# Patient Record
Sex: MALE | Race: ASIAN | Employment: FULL TIME | URBAN - METROPOLITAN AREA
[De-identification: names, ages, dates, MRNs, and addresses within clinical notes are randomized per-mention and may not be internally consistent; named-entity substitution may affect disease eponyms.]

---

## 2017-09-10 ENCOUNTER — HOSPITAL ENCOUNTER (EMERGENCY)
Age: 37
Discharge: HOME | End: 2017-09-10
Attending: EMERGENCY MEDICINE
Payer: COMMERCIAL

## 2017-09-10 ENCOUNTER — APPOINTMENT (OUTPATIENT)
Dept: CT IMAGING | Age: 37
End: 2017-09-10
Attending: EMERGENCY MEDICINE
Payer: COMMERCIAL

## 2017-09-10 VITALS
SYSTOLIC BLOOD PRESSURE: 117 MMHG | OXYGEN SATURATION: 98 % | WEIGHT: 195 LBS | HEIGHT: 72 IN | BODY MASS INDEX: 26.41 KG/M2 | TEMPERATURE: 98 F | RESPIRATION RATE: 16 BRPM | HEART RATE: 61 BPM | DIASTOLIC BLOOD PRESSURE: 74 MMHG

## 2017-09-10 DIAGNOSIS — N20.0 KIDNEY STONE: Primary | ICD-10-CM

## 2017-09-10 LAB
ALBUMIN SERPL-MCNC: 3.9 G/DL (ref 3.5–4.8)
ALP LIVER SERPL-CCNC: 104 U/L (ref 45–117)
ALT SERPL-CCNC: 50 U/L (ref 17–63)
AST SERPL-CCNC: 26 U/L (ref 15–41)
BASOPHILS # BLD AUTO: 0.02 X10(3) UL (ref 0–0.1)
BASOPHILS NFR BLD AUTO: 0.3 %
BILIRUB SERPL-MCNC: 0.3 MG/DL (ref 0.1–2)
BILIRUB UR QL STRIP.AUTO: NEGATIVE
BUN BLD-MCNC: 15 MG/DL (ref 8–20)
CALCIUM BLD-MCNC: 8.8 MG/DL (ref 8.3–10.3)
CHLORIDE: 108 MMOL/L (ref 101–111)
CO2: 27 MMOL/L (ref 22–32)
COLOR UR AUTO: YELLOW
CREAT BLD-MCNC: 1.16 MG/DL (ref 0.7–1.3)
EOSINOPHIL # BLD AUTO: 0.31 X10(3) UL (ref 0–0.3)
EOSINOPHIL NFR BLD AUTO: 3.9 %
ERYTHROCYTE [DISTWIDTH] IN BLOOD BY AUTOMATED COUNT: 12.3 % (ref 11.5–16)
GLUCOSE BLD-MCNC: 129 MG/DL (ref 70–99)
GLUCOSE UR STRIP.AUTO-MCNC: NEGATIVE MG/DL
HCT VFR BLD AUTO: 43.7 % (ref 37–53)
HGB BLD-MCNC: 15 G/DL (ref 13–17)
IMMATURE GRANULOCYTE COUNT: 0.01 X10(3) UL (ref 0–1)
IMMATURE GRANULOCYTE RATIO %: 0.1 %
LEUKOCYTE ESTERASE UR QL STRIP.AUTO: NEGATIVE
LYMPHOCYTES # BLD AUTO: 2.56 X10(3) UL (ref 0.9–4)
LYMPHOCYTES NFR BLD AUTO: 32.2 %
M PROTEIN MFR SERPL ELPH: 7.5 G/DL (ref 6.1–8.3)
MCH RBC QN AUTO: 30.3 PG (ref 27–33.2)
MCHC RBC AUTO-ENTMCNC: 34.3 G/DL (ref 31–37)
MCV RBC AUTO: 88.3 FL (ref 80–99)
MONOCYTES # BLD AUTO: 0.39 X10(3) UL (ref 0.1–0.6)
MONOCYTES NFR BLD AUTO: 4.9 %
NEUTROPHIL ABS PRELIM: 4.67 X10 (3) UL (ref 1.3–6.7)
NEUTROPHILS # BLD AUTO: 4.67 X10(3) UL (ref 1.3–6.7)
NEUTROPHILS NFR BLD AUTO: 58.6 %
NITRITE UR QL STRIP.AUTO: NEGATIVE
PH UR STRIP.AUTO: 6 [PH] (ref 4.5–8)
PLATELET # BLD AUTO: 217 10(3)UL (ref 150–450)
POTASSIUM SERPL-SCNC: 3.7 MMOL/L (ref 3.6–5.1)
PROT UR STRIP.AUTO-MCNC: NEGATIVE MG/DL
RBC # BLD AUTO: 4.95 X10(6)UL (ref 4.3–5.7)
RBC #/AREA URNS AUTO: >10 /HPF
RED CELL DISTRIBUTION WIDTH-SD: 40 FL (ref 35.1–46.3)
SODIUM SERPL-SCNC: 140 MMOL/L (ref 136–144)
SP GR UR STRIP.AUTO: 1.02 (ref 1–1.03)
UROBILINOGEN UR STRIP.AUTO-MCNC: 0.2 MG/DL
WBC # BLD AUTO: 8 X10(3) UL (ref 4–13)

## 2017-09-10 PROCEDURE — 81001 URINALYSIS AUTO W/SCOPE: CPT | Performed by: EMERGENCY MEDICINE

## 2017-09-10 PROCEDURE — 99285 EMERGENCY DEPT VISIT HI MDM: CPT

## 2017-09-10 PROCEDURE — 81015 MICROSCOPIC EXAM OF URINE: CPT | Performed by: EMERGENCY MEDICINE

## 2017-09-10 PROCEDURE — 85025 COMPLETE CBC W/AUTO DIFF WBC: CPT | Performed by: EMERGENCY MEDICINE

## 2017-09-10 PROCEDURE — 96374 THER/PROPH/DIAG INJ IV PUSH: CPT

## 2017-09-10 PROCEDURE — 74176 CT ABD & PELVIS W/O CONTRAST: CPT | Performed by: EMERGENCY MEDICINE

## 2017-09-10 PROCEDURE — 99284 EMERGENCY DEPT VISIT MOD MDM: CPT

## 2017-09-10 PROCEDURE — 80053 COMPREHEN METABOLIC PANEL: CPT | Performed by: EMERGENCY MEDICINE

## 2017-09-10 PROCEDURE — 96361 HYDRATE IV INFUSION ADD-ON: CPT

## 2017-09-10 RX ORDER — TAMSULOSIN HYDROCHLORIDE 0.4 MG/1
0.4 CAPSULE ORAL DAILY
Qty: 7 CAPSULE | Refills: 0 | Status: SHIPPED | OUTPATIENT
Start: 2017-09-10 | End: 2017-09-17

## 2017-09-10 RX ORDER — SODIUM CHLORIDE 9 MG/ML
INJECTION, SOLUTION INTRAVENOUS ONCE
Status: COMPLETED | OUTPATIENT
Start: 2017-09-10 | End: 2017-09-10

## 2017-09-10 RX ORDER — HYDROCODONE BITARTRATE AND ACETAMINOPHEN 5; 325 MG/1; MG/1
1-2 TABLET ORAL EVERY 6 HOURS PRN
Qty: 21 TABLET | Refills: 0 | Status: SHIPPED | OUTPATIENT
Start: 2017-09-10 | End: 2017-10-17

## 2017-09-10 RX ORDER — ONDANSETRON 4 MG/1
4 TABLET, ORALLY DISINTEGRATING ORAL EVERY 4 HOURS PRN
Qty: 10 TABLET | Refills: 0 | Status: SHIPPED | OUTPATIENT
Start: 2017-09-10 | End: 2017-09-17

## 2017-09-10 RX ORDER — KETOROLAC TROMETHAMINE 30 MG/ML
30 INJECTION, SOLUTION INTRAMUSCULAR; INTRAVENOUS ONCE
Status: COMPLETED | OUTPATIENT
Start: 2017-09-10 | End: 2017-09-10

## 2017-09-11 ENCOUNTER — ANESTHESIA (OUTPATIENT)
Dept: SURGERY | Facility: HOSPITAL | Age: 37
End: 2017-09-11
Payer: COMMERCIAL

## 2017-09-11 ENCOUNTER — APPOINTMENT (OUTPATIENT)
Dept: GENERAL RADIOLOGY | Facility: HOSPITAL | Age: 37
End: 2017-09-11
Attending: UROLOGY
Payer: COMMERCIAL

## 2017-09-11 ENCOUNTER — ANESTHESIA EVENT (OUTPATIENT)
Dept: SURGERY | Facility: HOSPITAL | Age: 37
End: 2017-09-11
Payer: COMMERCIAL

## 2017-09-11 ENCOUNTER — SURGERY (OUTPATIENT)
Age: 37
End: 2017-09-11

## 2017-09-11 ENCOUNTER — HOSPITAL ENCOUNTER (OUTPATIENT)
Facility: HOSPITAL | Age: 37
Setting detail: OBSERVATION
Discharge: HOME | End: 2017-09-12
Attending: EMERGENCY MEDICINE | Admitting: HOSPITALIST
Payer: COMMERCIAL

## 2017-09-11 DIAGNOSIS — N20.0 KIDNEY STONE: Primary | ICD-10-CM

## 2017-09-11 PROBLEM — N13.30 HYDRONEPHROSIS: Status: ACTIVE | Noted: 2017-09-11

## 2017-09-11 PROBLEM — N20.1 RIGHT URETERAL STONE: Status: ACTIVE | Noted: 2017-09-11

## 2017-09-11 PROBLEM — R10.9 RIGHT FLANK PAIN: Status: ACTIVE | Noted: 2017-09-11

## 2017-09-11 PROBLEM — R73.9 HYPERGLYCEMIA: Status: ACTIVE | Noted: 2017-09-11

## 2017-09-11 PROBLEM — N13.30 HYDRONEPHROSIS OF RIGHT KIDNEY: Status: ACTIVE | Noted: 2017-09-11

## 2017-09-11 PROBLEM — N20.1 CALCULI, URETER: Status: ACTIVE | Noted: 2017-09-11

## 2017-09-11 LAB
ALBUMIN SERPL-MCNC: 4.4 G/DL (ref 3.5–4.8)
ALP LIVER SERPL-CCNC: 97 U/L (ref 45–117)
ALT SERPL-CCNC: 50 U/L (ref 17–63)
AST SERPL-CCNC: 34 U/L (ref 15–41)
BASOPHILS # BLD AUTO: 0.02 X10(3) UL (ref 0–0.1)
BASOPHILS NFR BLD AUTO: 0.1 %
BILIRUB SERPL-MCNC: 0.4 MG/DL (ref 0.1–2)
BUN BLD-MCNC: 14 MG/DL (ref 8–20)
CALCIUM BLD-MCNC: 9.6 MG/DL (ref 8.3–10.3)
CHLORIDE: 106 MMOL/L (ref 101–111)
CO2: 24 MMOL/L (ref 22–32)
CREAT BLD-MCNC: 1.05 MG/DL (ref 0.7–1.3)
EOSINOPHIL # BLD AUTO: 0.06 X10(3) UL (ref 0–0.3)
EOSINOPHIL NFR BLD AUTO: 0.4 %
ERYTHROCYTE [DISTWIDTH] IN BLOOD BY AUTOMATED COUNT: 12.3 % (ref 11.5–16)
GLUCOSE BLD-MCNC: 103 MG/DL (ref 70–99)
HCT VFR BLD AUTO: 48.9 % (ref 37–53)
HGB BLD-MCNC: 16.1 G/DL (ref 13–17)
IMMATURE GRANULOCYTE COUNT: 0.04 X10(3) UL (ref 0–1)
IMMATURE GRANULOCYTE RATIO %: 0.3 %
LYMPHOCYTES # BLD AUTO: 1.68 X10(3) UL (ref 0.9–4)
LYMPHOCYTES NFR BLD AUTO: 12.1 %
M PROTEIN MFR SERPL ELPH: 8 G/DL (ref 6.1–8.3)
MCH RBC QN AUTO: 29.2 PG (ref 27–33.2)
MCHC RBC AUTO-ENTMCNC: 32.9 G/DL (ref 31–37)
MCV RBC AUTO: 88.7 FL (ref 80–99)
MONOCYTES # BLD AUTO: 0.49 X10(3) UL (ref 0.1–0.6)
MONOCYTES NFR BLD AUTO: 3.5 %
NEUTROPHIL ABS PRELIM: 11.64 X10 (3) UL (ref 1.3–6.7)
NEUTROPHILS # BLD AUTO: 11.64 X10(3) UL (ref 1.3–6.7)
NEUTROPHILS NFR BLD AUTO: 83.6 %
PLATELET # BLD AUTO: 236 10(3)UL (ref 150–450)
POTASSIUM SERPL-SCNC: 3.8 MMOL/L (ref 3.6–5.1)
RBC # BLD AUTO: 5.51 X10(6)UL (ref 4.3–5.7)
RED CELL DISTRIBUTION WIDTH-SD: 39.7 FL (ref 35.1–46.3)
SODIUM SERPL-SCNC: 143 MMOL/L (ref 136–144)
WBC # BLD AUTO: 13.9 X10(3) UL (ref 4–13)

## 2017-09-11 PROCEDURE — 0T768DZ DILATION OF RIGHT URETER WITH INTRALUMINAL DEVICE, VIA NATURAL OR ARTIFICIAL OPENING ENDOSCOPIC: ICD-10-PCS | Performed by: UROLOGY

## 2017-09-11 PROCEDURE — 99220 INITIAL OBSERVATION CARE,LEVL III: CPT | Performed by: HOSPITALIST

## 2017-09-11 PROCEDURE — 76000 FLUOROSCOPY <1 HR PHYS/QHP: CPT | Performed by: UROLOGY

## 2017-09-11 PROCEDURE — BT1DZZZ FLUOROSCOPY OF RIGHT KIDNEY, URETER AND BLADDER: ICD-10-PCS | Performed by: UROLOGY

## 2017-09-11 RX ORDER — DIAZEPAM 5 MG/1
5 TABLET ORAL EVERY 8 HOURS PRN
Status: DISCONTINUED | OUTPATIENT
Start: 2017-09-11 | End: 2017-09-12

## 2017-09-11 RX ORDER — DIAZEPAM 5 MG/1
5 TABLET ORAL EVERY 8 HOURS PRN
Qty: 20 TABLET | Refills: 0 | Status: SHIPPED | OUTPATIENT
Start: 2017-09-11 | End: 2017-09-26

## 2017-09-11 RX ORDER — ACETAMINOPHEN 325 MG/1
650 TABLET ORAL EVERY 6 HOURS PRN
Status: DISCONTINUED | OUTPATIENT
Start: 2017-09-11 | End: 2017-09-12

## 2017-09-11 RX ORDER — METHENAMINE, SODIUM PHOSPHATE, MONOBASIC, MONOHYDRATE, PHENYL SALICYLATE, METHYLENE BLUE, AND HYOSCYAMINE SULFATE 120; 40.8; 36; 10; .12 MG/1; MG/1; MG/1; MG/1; MG/1
1 CAPSULE ORAL 3 TIMES DAILY
Qty: 15 CAPSULE | Refills: 3 | Status: SHIPPED | OUTPATIENT
Start: 2017-09-11 | End: 2017-09-26

## 2017-09-11 RX ORDER — NALOXONE HYDROCHLORIDE 0.4 MG/ML
80 INJECTION, SOLUTION INTRAMUSCULAR; INTRAVENOUS; SUBCUTANEOUS AS NEEDED
Status: DISCONTINUED | OUTPATIENT
Start: 2017-09-11 | End: 2017-09-11 | Stop reason: HOSPADM

## 2017-09-11 RX ORDER — ALFUZOSIN HYDROCHLORIDE 10 MG/1
10 TABLET, EXTENDED RELEASE ORAL
Status: DISCONTINUED | OUTPATIENT
Start: 2017-09-12 | End: 2017-09-12

## 2017-09-11 RX ORDER — DIPHENHYDRAMINE HYDROCHLORIDE 50 MG/ML
12.5 INJECTION INTRAMUSCULAR; INTRAVENOUS AS NEEDED
Status: DISCONTINUED | OUTPATIENT
Start: 2017-09-11 | End: 2017-09-11 | Stop reason: HOSPADM

## 2017-09-11 RX ORDER — ONDANSETRON 2 MG/ML
4 INJECTION INTRAMUSCULAR; INTRAVENOUS EVERY 4 HOURS PRN
Status: CANCELLED | OUTPATIENT
Start: 2017-09-11

## 2017-09-11 RX ORDER — TOLTERODINE 4 MG/1
4 CAPSULE, EXTENDED RELEASE ORAL DAILY
Qty: 10 CAPSULE | Refills: 1 | Status: SHIPPED | OUTPATIENT
Start: 2017-09-11 | End: 2017-10-17

## 2017-09-11 RX ORDER — HYDROMORPHONE HYDROCHLORIDE 1 MG/ML
0.5 INJECTION, SOLUTION INTRAMUSCULAR; INTRAVENOUS; SUBCUTANEOUS EVERY 30 MIN PRN
Status: CANCELLED | OUTPATIENT
Start: 2017-09-11 | End: 2017-09-11

## 2017-09-11 RX ORDER — HYDROCODONE BITARTRATE AND ACETAMINOPHEN 5; 325 MG/1; MG/1
2 TABLET ORAL EVERY 4 HOURS PRN
Status: DISCONTINUED | OUTPATIENT
Start: 2017-09-11 | End: 2017-09-12

## 2017-09-11 RX ORDER — SODIUM CHLORIDE, SODIUM LACTATE, POTASSIUM CHLORIDE, CALCIUM CHLORIDE 600; 310; 30; 20 MG/100ML; MG/100ML; MG/100ML; MG/100ML
INJECTION, SOLUTION INTRAVENOUS CONTINUOUS
Status: DISCONTINUED | OUTPATIENT
Start: 2017-09-11 | End: 2017-09-12

## 2017-09-11 RX ORDER — ONDANSETRON 2 MG/ML
4 INJECTION INTRAMUSCULAR; INTRAVENOUS ONCE
Status: COMPLETED | OUTPATIENT
Start: 2017-09-11 | End: 2017-09-11

## 2017-09-11 RX ORDER — ONDANSETRON 2 MG/ML
4 INJECTION INTRAMUSCULAR; INTRAVENOUS EVERY 6 HOURS PRN
Status: DISCONTINUED | OUTPATIENT
Start: 2017-09-11 | End: 2017-09-12

## 2017-09-11 RX ORDER — MEPERIDINE HYDROCHLORIDE 25 MG/ML
12.5 INJECTION INTRAMUSCULAR; INTRAVENOUS; SUBCUTANEOUS AS NEEDED
Status: DISCONTINUED | OUTPATIENT
Start: 2017-09-11 | End: 2017-09-11 | Stop reason: HOSPADM

## 2017-09-11 RX ORDER — ONDANSETRON 4 MG/1
4 TABLET, FILM COATED ORAL EVERY 6 HOURS PRN
Status: DISCONTINUED | OUTPATIENT
Start: 2017-09-11 | End: 2017-09-12

## 2017-09-11 RX ORDER — ACETAMINOPHEN 500 MG
1000 TABLET ORAL ONCE AS NEEDED
Status: DISCONTINUED | OUTPATIENT
Start: 2017-09-11 | End: 2017-09-11 | Stop reason: HOSPADM

## 2017-09-11 RX ORDER — HYDROMORPHONE HYDROCHLORIDE 1 MG/ML
1 INJECTION, SOLUTION INTRAMUSCULAR; INTRAVENOUS; SUBCUTANEOUS ONCE
Status: COMPLETED | OUTPATIENT
Start: 2017-09-11 | End: 2017-09-11

## 2017-09-11 RX ORDER — PHENAZOPYRIDINE HYDROCHLORIDE 200 MG/1
200 TABLET, FILM COATED ORAL 3 TIMES DAILY PRN
Status: DISCONTINUED | OUTPATIENT
Start: 2017-09-11 | End: 2017-09-12

## 2017-09-11 RX ORDER — SODIUM CHLORIDE 9 MG/ML
INJECTION, SOLUTION INTRAVENOUS CONTINUOUS
Status: DISCONTINUED | OUTPATIENT
Start: 2017-09-11 | End: 2017-09-12

## 2017-09-11 RX ORDER — HYDROMORPHONE HYDROCHLORIDE 1 MG/ML
0.4 INJECTION, SOLUTION INTRAMUSCULAR; INTRAVENOUS; SUBCUTANEOUS EVERY 2 HOUR PRN
Status: DISCONTINUED | OUTPATIENT
Start: 2017-09-11 | End: 2017-09-11

## 2017-09-11 RX ORDER — SODIUM CHLORIDE 9 MG/ML
INJECTION, SOLUTION INTRAVENOUS CONTINUOUS
Status: CANCELLED | OUTPATIENT
Start: 2017-09-11 | End: 2017-09-11

## 2017-09-11 RX ORDER — HYDROMORPHONE HYDROCHLORIDE 1 MG/ML
0.4 INJECTION, SOLUTION INTRAMUSCULAR; INTRAVENOUS; SUBCUTANEOUS EVERY 5 MIN PRN
Status: DISCONTINUED | OUTPATIENT
Start: 2017-09-11 | End: 2017-09-11 | Stop reason: HOSPADM

## 2017-09-11 RX ORDER — HYDROMORPHONE HYDROCHLORIDE 1 MG/ML
0.8 INJECTION, SOLUTION INTRAMUSCULAR; INTRAVENOUS; SUBCUTANEOUS EVERY 2 HOUR PRN
Status: DISCONTINUED | OUTPATIENT
Start: 2017-09-11 | End: 2017-09-11

## 2017-09-11 RX ORDER — ONDANSETRON 2 MG/ML
4 INJECTION INTRAMUSCULAR; INTRAVENOUS EVERY 6 HOURS PRN
Status: DISCONTINUED | OUTPATIENT
Start: 2017-09-11 | End: 2017-09-11

## 2017-09-11 RX ORDER — HYDROCODONE BITARTRATE AND ACETAMINOPHEN 5; 325 MG/1; MG/1
1 TABLET ORAL EVERY 6 HOURS PRN
Status: DISCONTINUED | OUTPATIENT
Start: 2017-09-11 | End: 2017-09-12

## 2017-09-11 RX ORDER — ONDANSETRON 2 MG/ML
4 INJECTION INTRAMUSCULAR; INTRAVENOUS AS NEEDED
Status: DISCONTINUED | OUTPATIENT
Start: 2017-09-11 | End: 2017-09-11 | Stop reason: HOSPADM

## 2017-09-11 RX ORDER — DIAZEPAM 5 MG/1
5 TABLET ORAL EVERY 8 HOURS PRN
Qty: 20 TABLET | Refills: 0 | Status: SHIPPED | OUTPATIENT
Start: 2017-09-11 | End: 2017-10-01

## 2017-09-11 RX ORDER — OXYBUTYNIN CHLORIDE 5 MG/1
5 TABLET ORAL EVERY 6 HOURS PRN
Status: DISCONTINUED | OUTPATIENT
Start: 2017-09-11 | End: 2017-09-12

## 2017-09-11 RX ORDER — HYDROMORPHONE HYDROCHLORIDE 1 MG/ML
0.5 INJECTION, SOLUTION INTRAMUSCULAR; INTRAVENOUS; SUBCUTANEOUS EVERY 2 HOUR PRN
Status: DISCONTINUED | OUTPATIENT
Start: 2017-09-11 | End: 2017-09-12

## 2017-09-11 RX ORDER — HYDROMORPHONE HYDROCHLORIDE 1 MG/ML
1 INJECTION, SOLUTION INTRAMUSCULAR; INTRAVENOUS; SUBCUTANEOUS EVERY 2 HOUR PRN
Status: DISCONTINUED | OUTPATIENT
Start: 2017-09-11 | End: 2017-09-12

## 2017-09-11 RX ORDER — DOCUSATE SODIUM 100 MG/1
100 CAPSULE, LIQUID FILLED ORAL 2 TIMES DAILY
Status: DISCONTINUED | OUTPATIENT
Start: 2017-09-11 | End: 2017-09-12

## 2017-09-11 RX ORDER — HYDROMORPHONE HYDROCHLORIDE 1 MG/ML
0.2 INJECTION, SOLUTION INTRAMUSCULAR; INTRAVENOUS; SUBCUTANEOUS EVERY 2 HOUR PRN
Status: DISCONTINUED | OUTPATIENT
Start: 2017-09-11 | End: 2017-09-11

## 2017-09-11 RX ORDER — TAMSULOSIN HYDROCHLORIDE 0.4 MG/1
0.4 CAPSULE ORAL EVERY EVENING
Qty: 10 CAPSULE | Refills: 0 | Status: SHIPPED | OUTPATIENT
Start: 2017-09-11 | End: 2017-09-21

## 2017-09-11 NOTE — PLAN OF CARE
NURSING ADMISSION NOTE      Patient admitted via ER  Oriented to room. Safety precautions initiated. Bed in low position. Call light in reach. Per ER RN Urology and Hospitalist aware of consults.

## 2017-09-11 NOTE — ED INITIAL ASSESSMENT (HPI)
Intermitent right sided abdominal pain that radiates into the groin. Pt reports it feels similar to his last kidney stone.

## 2017-09-11 NOTE — ED PROVIDER NOTES
Patient Seen in: [facility] Emergency Department    History   Patient presents with:  Abdomen/Flank Pain (GI/)    Stated Complaint: pt to have KS removed at 2130 but [facility]     HPI    Mr. Lars Barron is a 45-year-old male cornea clear, normal fundoscopic exam   Ears:  TM pearly gray color, external ear canals normal, both ears, no mastoid tenderness bilaterally   Nose:  Nares symmetrical, minimal watery discharge; no sinus tenderness  Throat:  Lips, tongue, and mucosa are m DRAW BLUE   RAINBOW DRAW LAVENDER   RAINBOW DRAW LIGHT GREEN   RAINBOW DRAW GOLD     Ct Abdomen+pelvis Kidneystone 2d Rndr(no Iv,no Oral)(cpt=74176)    Result Date: 9/10/2017  PROCEDURE:  CT ABDOMEN+PELVIS KIDNEYSTONE 2D RNDR(NO IV,NO ORAL)(CPT=74176)  COM hydronephrosis and trace right perinephric stranding due to a 10 mm stone in the proximal right ureter just beyond the UPJ.  2. Bilateral nephrolithiasis. 3. 2 mm probable cyst along the lower pole left kidney. Please see above for further details.   Dic

## 2017-09-11 NOTE — ED PROVIDER NOTES
Patient Seen in: THE [facility] Emergency Department In [place]    History   Patient presents with:  Abdomen/Flank Pain (GI/)    Stated Complaint: R abd pain, a little back pain, denies urinary s/s    HPI    Patient is a 62-year-old  male who presents emerg sitting up breathing easily in no apparent distress. Patient is nontoxic in appearance. HEENT: Head is normocephalic, atraumatic. Pupils are 4 mm equally round and reactive to light. Oropharynx is clear.  Mucous membranes are moist.  NECK: There is no foc Narrative: The following orders were created for panel order CBC WITH DIFFERENTIAL WITH PLATELET.   Procedure                               Abnormality         Status                     ---------                               -----------         ------ discharged home at this time.         Disposition and Plan     Clinical Impression:  Kidney stone  (primary encounter diagnosis)    Disposition:  Discharge    Follow-up:  Edwin Nichols MD  [facility], [address] [id number]

## 2017-09-11 NOTE — ED INITIAL ASSESSMENT (HPI)
Pt dx with bilateral kidney stones. Pt scheduled for OR today at 2130. Pt states taking 1 5mg norco at 1100 and 1 5mg norco at 1200 without relief. Pt states pain to bilateral flanks but worse on right.   Pt states 1 episode of emesis prior to arrival.

## 2017-09-11 NOTE — H&P
[facility]  History and Physical     Jane Loges Patient Status:  Emergency    1980 MRN [id number]   Location [address] [facility] Attending Delfin Dancer, MD   Hosp Day # 0 PCP Rashel Cramer MD     Chief Complaint: negatives noted in the HPI. Physical Exam:    /72   Pulse 73   Temp 97.6 °F (36.4 °C) (Temporal)   Resp 16   Ht 6' (1.829 m)   Wt 195 lb (88.5 kg)   SpO2 100%   BMI 26.45 kg/m²   General: No acute distress. Alert and oriented x 3.    HEENT: Normoce

## 2017-09-12 ENCOUNTER — APPOINTMENT (OUTPATIENT)
Dept: GENERAL RADIOLOGY | Facility: HOSPITAL | Age: 37
End: 2017-09-12
Attending: HOSPITALIST
Payer: COMMERCIAL

## 2017-09-12 VITALS
SYSTOLIC BLOOD PRESSURE: 133 MMHG | DIASTOLIC BLOOD PRESSURE: 84 MMHG | BODY MASS INDEX: 26.41 KG/M2 | TEMPERATURE: 98 F | RESPIRATION RATE: 16 BRPM | OXYGEN SATURATION: 99 % | HEART RATE: 69 BPM | WEIGHT: 195 LBS | HEIGHT: 72 IN

## 2017-09-12 LAB
BASOPHILS # BLD AUTO: 0.01 X10(3) UL (ref 0–0.1)
BASOPHILS NFR BLD AUTO: 0.1 %
BUN BLD-MCNC: 10 MG/DL (ref 8–20)
CALCIUM BLD-MCNC: 9.2 MG/DL (ref 8.3–10.3)
CHLORIDE: 108 MMOL/L (ref 101–111)
CO2: 26 MMOL/L (ref 22–32)
CREAT BLD-MCNC: 0.73 MG/DL (ref 0.7–1.3)
EOSINOPHIL # BLD AUTO: 0 X10(3) UL (ref 0–0.3)
EOSINOPHIL NFR BLD AUTO: 0 %
ERYTHROCYTE [DISTWIDTH] IN BLOOD BY AUTOMATED COUNT: 12.3 % (ref 11.5–16)
GLUCOSE BLD-MCNC: 126 MG/DL (ref 70–99)
HCT VFR BLD AUTO: 43.7 % (ref 37–53)
HGB BLD-MCNC: 14.7 G/DL (ref 13–17)
IMMATURE GRANULOCYTE COUNT: 0.03 X10(3) UL (ref 0–1)
IMMATURE GRANULOCYTE RATIO %: 0.3 %
LYMPHOCYTES # BLD AUTO: 1.07 X10(3) UL (ref 0.9–4)
LYMPHOCYTES NFR BLD AUTO: 11.9 %
MCH RBC QN AUTO: 29.6 PG (ref 27–33.2)
MCHC RBC AUTO-ENTMCNC: 33.6 G/DL (ref 31–37)
MCV RBC AUTO: 88.1 FL (ref 80–99)
MONOCYTES # BLD AUTO: 0.22 X10(3) UL (ref 0.1–0.6)
MONOCYTES NFR BLD AUTO: 2.4 %
NEUTROPHIL ABS PRELIM: 7.66 X10 (3) UL (ref 1.3–6.7)
NEUTROPHILS # BLD AUTO: 7.66 X10(3) UL (ref 1.3–6.7)
NEUTROPHILS NFR BLD AUTO: 85.3 %
PLATELET # BLD AUTO: 209 10(3)UL (ref 150–450)
POTASSIUM SERPL-SCNC: 5 MMOL/L (ref 3.6–5.1)
RBC # BLD AUTO: 4.96 X10(6)UL (ref 4.3–5.7)
RED CELL DISTRIBUTION WIDTH-SD: 39.6 FL (ref 35.1–46.3)
SODIUM SERPL-SCNC: 141 MMOL/L (ref 136–144)
WBC # BLD AUTO: 9 X10(3) UL (ref 4–13)

## 2017-09-12 PROCEDURE — 99217 OBSERVATION CARE DISCHARGE: CPT | Performed by: HOSPITALIST

## 2017-09-12 PROCEDURE — 74000 XR ABDOMEN (KUB) (1 AP VIEW)  (CPT=74000): CPT | Performed by: HOSPITALIST

## 2017-09-12 NOTE — BRIEF OP NOTE
Pre-Operative Diagnosis: Calculi, ureter [N20.1], right hydronephrosis, right flank pain     Post-Operative Diagnosis: Calculi, ureter [N20.1], right hydronephrosis, right flank pain     Procedure Performed:   Procedure(s):  CYSTOSCOPY, RIGHT RETROGRADE

## 2017-09-12 NOTE — INTERVAL H&P NOTE
Pre-op Diagnosis: Calculi, ureter [N20.1]    The above referenced H&P was reviewed by Marin Perea MD on 9/11/2017, the patient was examined and no significant changes have occurred in the patient's condition since the H&P was performed.   I discussed wit

## 2017-09-12 NOTE — DISCHARGE SUMMARY
[facility]  DISCHARGE SUMMARY     Rosie Roberts Patient Status:  Observation    1980 MRN [id number]   [facility] 3NW-A Attending Caprice Glez MD   Hosp Day # 0 PCP Torey Dasilva MD     Date of Admission: 2017  Date of Instructions Prescription details   bisacodyl 5 MG Tbec  Commonly known as:  DULCOLAX      Take 1 tablet (5 mg total) by mouth 2 (two) times daily.    Stop taking on:  9/26/2017  Quantity:  20 tablet  Refills:  0     diazepam 5 MG Tabs  Commonly known as: Tabs  Commonly known as:  NORCO      Take 1-2 tablets by mouth every 6 (six) hours as needed.    Quantity:  21 tablet  Refills:  0     ondansetron 4 MG Tbdp  Commonly known as:  ZOFRAN-ODT      Take 1 tablet (4 mg total) by mouth every 4 (four) hours as nee

## 2017-09-12 NOTE — PROGRESS NOTES
[facility]  Urology Progress Note    Silva Barrientos Patient Status:  Observation    1980 MRN [id number]   [facility] 3NW-A Attending Lisseth Crabtree MD   Hosp Day # 0 PCP Ata Decker MD     Subjective:   Terence Berry is a(n) 37

## 2017-09-12 NOTE — PLAN OF CARE
Absence of urinary retention Not Progressing      Discharge to home or other facility with appropriate resources Progressing      Verbalizes/displays adequate comfort level or patient's stated pain goal Progressing      Patient/Family Long Term Goal Progre

## 2017-09-12 NOTE — PAYOR COMM NOTE
--------------  ADMISSION REVIEW     Payor: [address] PPO  Subscriber #:  [id number]  Authorization Number: N/A    Admit date: N/A  Admit time: N/A       Admitting Physician: Yancy Mcneal MD  Attending Physician:  No att. providers found  P vital signs reviewed. All other systems reviewed and negative except as noted above. PSFH elements reviewed from today and agreed except as otherwise stated in HPI.     Physical Exam   ED Triage Vitals [09/11/17 1515]  BP: 140/90  Pulse: 57  Resp: 2 components within normal limits   CBC W/ DIFFERENTIAL - Abnormal; Notable for the following:     WBC 13.9 (*)     Neutrophil Absolute Prelim 11.64 (*)     Neutrophil Absolute 11.64 (*)     All other components within normal limits   CBC WITH DIFFERENTIAL W left kidney. 5 mm nonobstructing stone in the left mid kidney. A few scattered punctate nonobstructing stones in the right kidney.  There is mild right hydronephrosis and trace right perinephric stranding  due to a 10 mm stone in the proximal right ureter j impression(s):  Kidney stone  (primary encounter diagnosis). I agree with the plan as noted. Signed by Vaishali Graf MD on 9/11/2017  5:21 PM   Attribution Key     [address] 1 - Monserrat Hastings on 9/11/2017  3:20 PM   [facility] [address] 2 - [facility]. Family History:[PT.1] Unremarkable.[PT.2]    Allergies: No Known Allergies    Medications:    No current facility-administered medications on file prior to encounter.    Current Outpatient Prescriptions on File Prior to Encounter:  HYDROcodone-ace hours.    Imaging: Imaging data reviewed in Epic. ASSESSMENT / PLAN:     1. Flank pain d/t bilateral nephrolithiasis, 10mm proximal right ureteral stone with mild right hydronephrosis and perinephric stranding  2. Dyslipidemia   3.  Hypothyroidism Route User    Discharged on 9/12/2017 9/11/2017 1804 Given 0.8 mg Intravenous eJfry Carlos RN      HYDROmorphone HCl PF (DILAUDID) 1 MG/ML injection 0.5 mg     Date Action Dose Route User    Discharged on 9/12/2017 9/12/2017 0111 Given 0.5 mg

## 2017-09-12 NOTE — PROGRESS NOTES
From pacu per bed. Alert and awake c/o sl discomfort upper abd. No other c/o offered. Review of md orders. On r/a sats 95%. No urge to void at this time. Complete feeling in feet and legs danielle. Reports will stay over night for his recovery.  Taking sips of l

## 2017-09-12 NOTE — ANESTHESIA POSTPROCEDURE EVALUATION
[address] Patient Status:  Observation   Age/Gender 30year old male MRN [id number]   [facility] 3NW-A Attending Nenita Ledbetter MD   Hosp Day # 0 PCP Rowan Gupta MD       Anesthesia Post-op Note    Procedure(s):  CY

## 2017-09-12 NOTE — ANESTHESIA PREPROCEDURE EVALUATION
PRE-OP EVALUATION    Patient Name: Liam Cutler    Pre-op Diagnosis: Calculi, ureter [N20.1]    Procedure(s):  CYSTOSCOPY, RIGHT RETROGRADE, POSSIBLE STONE EXTRACTION, POSSIBLE LASER LITHOTRIPSY, RIGHT URETEROSCOPY, POSSIBLE RIGHT STENT    Surgeon(s) and tamsulosin HCl (FLOMAX) 0.4 MG Oral Cap Take 1 capsule (0.4 mg total) by mouth daily. Disp: 7 capsule Rfl: 0       Allergies: Review of patient's allergies indicates no known allergies. Anesthesia Evaluation    Patient summary reviewed.     Anestheti

## 2017-09-13 NOTE — OPERATIVE REPORT
[address]    PATIENT'S NAME: JARET MOSS SHAHAB   ATTENDING PHYSICIAN: Zohaib Jackson M.D. OPERATING PHYSICIAN: Oly Leavitt M.D.    PATIENT ACCOUNT#:   [de-identified]    LOCATION:  [address] [place] [address]  MEDICAL RECORD #:   [id number]       DATE OF BIRTH:  [birth year] dilator. Attempt was made to advance in a ureteral sheath, which was unable to accommodate due to the size of the ureter.   Therefore, the flexible ureteroscope was then advanced into the ureter but could not be advanced due to the delicate narr

## 2017-09-26 ENCOUNTER — OFFICE VISIT (OUTPATIENT)
Dept: FAMILY MEDICINE CLINIC | Facility: CLINIC | Age: 37
End: 2017-09-26

## 2017-09-26 VITALS
TEMPERATURE: 98 F | DIASTOLIC BLOOD PRESSURE: 70 MMHG | HEIGHT: 72 IN | SYSTOLIC BLOOD PRESSURE: 120 MMHG | RESPIRATION RATE: 16 BRPM | HEART RATE: 72 BPM | BODY MASS INDEX: 26.63 KG/M2 | WEIGHT: 196.63 LBS

## 2017-09-26 DIAGNOSIS — Z00.00 ROUTINE ADULT HEALTH MAINTENANCE: Primary | ICD-10-CM

## 2017-09-26 PROCEDURE — 99395 PREV VISIT EST AGE 18-39: CPT | Performed by: FAMILY MEDICINE

## 2017-09-26 RX ORDER — MAGNESIUM OXIDE 400 MG (241.3 MG MAGNESIUM) TABLET
400 TABLET DAILY
COMMUNITY
End: 2017-10-17

## 2017-09-26 NOTE — PROGRESS NOTES
Agus Serrano is a 30year old male who presents for a complete physical exam.   HPI:   Pt complains of nothing.   Urination changes no  ED symptoms no  Immunizations needed no  Wt Readings from Last 6 Encounters:  09/26/17 : 196 lb 9.6 oz  09/11/17 : 195 l Absolute Prelim 11.64 (H) 1.30 - 6.70 x10 (3) uL   Neutrophil Absolute 11.64 (H) 1.30 - 6.70 x10(3) uL   Lymphocyte Absolute 1.68 0.90 - 4.00 x10(3) uL   Monocyte Absolute 0.49 0.10 - 0.60 x10(3) uL   Eosinophil Absolute 0.06 0.00 - 0.30 x10(3) uL   Basoph Tab EC Take 1 tablet (5 mg total) by mouth 2 (two) times daily. Disp: 20 tablet Rfl: 0   HYDROcodone-acetaminophen 5-325 MG Oral Tab Take 1-2 tablets by mouth every 6 (six) hours as needed.  Disp: 21 tablet Rfl: 0      Past Medical History:   Diagnosis Date normal voice  SKIN: no rashes, no suspicious moles or lesions  HENT: NCAT, EACs clear b/l, TMs normal b/l, nasal turbinatess normal b/l, oropharynx with mmm/clear/normal, gingiva normal, lips normal  EYES: PERRLA, EOMI, conjunctiva non-injected and non-ict recommendations and agrees to the plan. Pt has form for labs but it says for [employer]. Lab order printed. Pt will go to a lab power facility now as he is fasting. H/o recurrent kidney stones, uptodate resource given. Pt can see prevention measures.

## 2017-10-10 ENCOUNTER — TELEPHONE (OUTPATIENT)
Dept: FAMILY MEDICINE CLINIC | Facility: CLINIC | Age: 37
End: 2017-10-10

## 2017-10-17 ENCOUNTER — OFFICE VISIT (OUTPATIENT)
Dept: FAMILY MEDICINE CLINIC | Facility: CLINIC | Age: 37
End: 2017-10-17

## 2017-10-17 VITALS
RESPIRATION RATE: 16 BRPM | DIASTOLIC BLOOD PRESSURE: 88 MMHG | SYSTOLIC BLOOD PRESSURE: 136 MMHG | BODY MASS INDEX: 26.55 KG/M2 | HEART RATE: 62 BPM | TEMPERATURE: 98 F | HEIGHT: 72 IN | WEIGHT: 196 LBS

## 2017-10-17 DIAGNOSIS — E55.9 VITAMIN D DEFICIENCY: ICD-10-CM

## 2017-10-17 DIAGNOSIS — E78.00 HIGH CHOLESTEROL: Primary | ICD-10-CM

## 2017-10-17 PROCEDURE — 99213 OFFICE O/P EST LOW 20 MIN: CPT | Performed by: FAMILY MEDICINE

## 2017-10-17 NOTE — PROGRESS NOTES
HPI:    Patient ID: Paddy Torres is a 30year old male. HPI  Patient is here for follow-up of labs. He has a history of kidney stone recently and has changed his diet somewhat.   Review of Systems  Negative except for the above       No current outpati Referrals:  None       #[id number]

## 2018-06-28 ENCOUNTER — OFFICE VISIT (OUTPATIENT)
Dept: FAMILY MEDICINE CLINIC | Facility: CLINIC | Age: 38
End: 2018-06-28

## 2018-06-28 VITALS — HEIGHT: 72 IN

## 2018-06-28 DIAGNOSIS — Z72.0 TOBACCO USE: ICD-10-CM

## 2018-06-28 DIAGNOSIS — E78.00 HIGH CHOLESTEROL: ICD-10-CM

## 2018-06-28 DIAGNOSIS — R73.9 ELEVATED BLOOD SUGAR: ICD-10-CM

## 2018-06-28 DIAGNOSIS — R07.9 CHEST PAIN, UNSPECIFIED TYPE: Primary | ICD-10-CM

## 2018-06-28 PROCEDURE — 99214 OFFICE O/P EST MOD 30 MIN: CPT | Performed by: FAMILY MEDICINE

## 2018-06-28 NOTE — PROGRESS NOTES
HPI:    Patient ID: Windy Sharif is a 30year old male. HPI  Brother in law 41 y/o , worked out a lot. Ate healthy. Had heart attack in his sleep and passed away. Patient states that when he does do exercise he does get some chest pain off and on.   He follow-up after that. I told patient that he should start on a statin drug for his cholesterol but he would like to try diet and exercise first.  4/1/18  Glucose 99  hdl 32  ldl 190  Tc 263  tg 221  Waist circ 39 in.    No orders of the defined types were

## 2018-07-17 ENCOUNTER — HOSPITAL ENCOUNTER (OUTPATIENT)
Dept: CT IMAGING | Age: 38
Discharge: HOME | End: 2018-07-17
Attending: FAMILY MEDICINE

## 2018-07-17 DIAGNOSIS — Z13.9 VISIT FOR SCREENING: ICD-10-CM

## 2018-07-30 ENCOUNTER — TELEPHONE (OUTPATIENT)
Dept: FAMILY MEDICINE CLINIC | Facility: CLINIC | Age: 38
End: 2018-07-30

## 2018-07-30 NOTE — TELEPHONE ENCOUNTER
[7/30/2018 4:35 PM] Tay Gearing: here it is    Number above is for patients wife. Wife has limited information. States she would prefer for pt to be seen. Appt was scheduled for Wednesday. PT will call if appt needs to be rescheduled.   Wing [place]

## 2018-08-01 ENCOUNTER — OFFICE VISIT (OUTPATIENT)
Dept: FAMILY MEDICINE CLINIC | Facility: CLINIC | Age: 38
End: 2018-08-01
Payer: COMMERCIAL

## 2018-08-01 VITALS
TEMPERATURE: 99 F | DIASTOLIC BLOOD PRESSURE: 72 MMHG | HEART RATE: 76 BPM | RESPIRATION RATE: 14 BRPM | WEIGHT: 192 LBS | SYSTOLIC BLOOD PRESSURE: 118 MMHG | HEIGHT: 72 IN | OXYGEN SATURATION: 99 % | BODY MASS INDEX: 26.01 KG/M2

## 2018-08-01 DIAGNOSIS — R35.0 INCREASED FREQUENCY OF URINATION: Primary | ICD-10-CM

## 2018-08-01 DIAGNOSIS — E78.00 HIGH CHOLESTEROL: ICD-10-CM

## 2018-08-01 DIAGNOSIS — R63.1 POLYDIPSIA: ICD-10-CM

## 2018-08-01 DIAGNOSIS — R35.89 POLYURIA: ICD-10-CM

## 2018-08-01 DIAGNOSIS — E11.8 TYPE 2 DIABETES MELLITUS WITH COMPLICATION, WITHOUT LONG-TERM CURRENT USE OF INSULIN (HCC): ICD-10-CM

## 2018-08-01 LAB
BILIRUBIN URINE: NEGATIVE
BLOOD URINE: NEGATIVE
CONTROL RUN WITHIN 24 HOURS?: YES
GLUCOSE URINE: 500
KETONE URINE: NEGATIVE
LEUKOCYTE ESTERASE URINE: NEGATIVE
NITRITE URINE: NEGATIVE
PH URINE: 6 (ref 5–8)
PROTEIN URINE: NEGATIVE
SPEC GRAVITY: 1 (ref 1–1.03)
TEST STRIP LOT #: NORMAL NUMERIC
URINE CLARITY: CLEAR
URINE COLOR: YELLOW
UROBILINOGEN URINE: 0.2

## 2018-08-01 PROCEDURE — 99213 OFFICE O/P EST LOW 20 MIN: CPT | Performed by: FAMILY MEDICINE

## 2018-08-01 PROCEDURE — 82962 GLUCOSE BLOOD TEST: CPT | Performed by: FAMILY MEDICINE

## 2018-08-01 RX ORDER — ATORVASTATIN CALCIUM 40 MG/1
40 TABLET, FILM COATED ORAL NIGHTLY
Qty: 30 TABLET | Refills: 3 | Status: SHIPPED | OUTPATIENT
Start: 2018-08-01 | End: 2018-09-28

## 2018-08-01 NOTE — PROGRESS NOTES
HPI:    Patient ID: Aranza Shelby is a 30year old male. Patient is here for evaluation of polyuria and polydipsia. He has been voiding about 15 times per day. This has been occurring for the past three weeks.    Denies nausea, vomiting, abdominal pain, 08/01/18  1416   BP: 118/72   Pulse: 76   Resp: 14   Temp: 98.5 °F (36.9 °C)            ASSESSMENT/PLAN:   Increased frequency of urination  (primary encounter diagnosis);  Type 2 DM  - glucose >500 on UA  - Accucheck unattainable due to reading stating it'

## 2018-08-03 LAB
BUN: 12 MG/DL (ref 7–25)
CALCIUM: 9.9 MG/DL (ref 8.6–10.3)
CARBON DIOXIDE: 29 MMOL/L (ref 20–31)
CHLORIDE: 98 MMOL/L (ref 98–110)
CREATININE: 0.93 MG/DL (ref 0.6–1.35)
EGFR IF AFRICN AM: 123 ML/MIN/1.73M2
EGFR IF NONAFRICN AM: 106 ML/MIN/1.73M2
GLUCOSE: 272 MG/DL (ref 65–99)
HEMOGLOBIN A1C: 10.1 % OF TOTAL HGB
POTASSIUM: 4.4 MMOL/L (ref 3.5–5.3)
SODIUM: 136 MMOL/L (ref 135–146)

## 2018-08-08 ENCOUNTER — OFFICE VISIT (OUTPATIENT)
Dept: FAMILY MEDICINE CLINIC | Facility: CLINIC | Age: 38
End: 2018-08-08
Payer: COMMERCIAL

## 2018-08-08 VITALS
TEMPERATURE: 98 F | BODY MASS INDEX: 25.73 KG/M2 | WEIGHT: 190 LBS | HEIGHT: 72 IN | OXYGEN SATURATION: 99 % | DIASTOLIC BLOOD PRESSURE: 80 MMHG | SYSTOLIC BLOOD PRESSURE: 124 MMHG | HEART RATE: 65 BPM | RESPIRATION RATE: 16 BRPM

## 2018-08-08 DIAGNOSIS — E11.9 NEW ONSET TYPE 2 DIABETES MELLITUS (HCC): Primary | ICD-10-CM

## 2018-08-08 PROCEDURE — 99214 OFFICE O/P EST MOD 30 MIN: CPT | Performed by: FAMILY MEDICINE

## 2018-08-08 RX ORDER — BLOOD-GLUCOSE METER
1 KIT MISCELLANEOUS AS NEEDED
Refills: 0 | Status: CANCELLED | OUTPATIENT
Start: 2018-08-08

## 2018-08-08 RX ORDER — BLOOD-GLUCOSE METER
1 KIT MISCELLANEOUS AS NEEDED
Qty: 1 EACH | Refills: 0 | Status: SHIPPED | OUTPATIENT
Start: 2018-08-08 | End: 2018-08-28

## 2018-08-08 RX ORDER — LANCETS
1 EACH MISCELLANEOUS DAILY
Qty: 1 BOX | Refills: 0 | Status: SHIPPED | OUTPATIENT
Start: 2018-08-08 | End: 2018-08-28

## 2018-08-08 NOTE — PATIENT INSTRUCTIONS
Thank you for allowing me to participate in your care today. I will contact you with any results from today's visit. Lab results are typically available in 2-3 days for blood tests, and 3-5 days for any cultures or Paps.    Please let me know if you hav The strip goes into the meter first, then a drop of blood is placed on the tip of the strip. The meter then shows a reading that tells you the level of your blood sugar. Your readings should be in your target range as often as possible.  This means not too · [employer] [phone] www.nei.nih.gov  · Javi 112 www.hormone. org  Date Last Reviewed: 5/1/2016  © 3152-3336 [employer]. [address] [facility], [address] [place] [address]. All rights reserved.  This informat

## 2018-08-08 NOTE — PROGRESS NOTES
Patient presents with:  Lab Results: new diabetic      HPI:   Patient is here to discuss his new onset type 2 dm. A1c result last week was 10. 1. Symptoms: tingling in bilateral legs, fatigue, tired, increased urinary frequency.      He has a smoking history Inject 10 Units into the skin every morning., Disp: 3 mL, Rfl: 3  •  ACCU-CHEK FASTCLIX LANCETS Does not apply Misc, 1 lancet by Finger stick route daily.  Use as directed., Disp: 1 Box, Rfl: 0  •  FREESTYLE SYSTEM Does not apply Kit, 1 each by Does not john stick route daily. Use as directed. FREESTYLE SYSTEM Does not apply Kit 1 each 0      Si each by Does not apply route as needed for Other.            Imaging & Consults:  OP REFERRAL NEW ONSET DIABETES- NO PREV EDUCATION 10 HRS  OPHTHALMOLOGY - INT

## 2018-08-08 NOTE — PROGRESS NOTES
No chief complaint on file.       HPI:   ***    Diabetic information   Duration: ***    Testing Frequency: ***  Glucometer: ***    Overall glucose control:***    Associated symptoms: ***    Current Glucose level: ***    Severity:   Lab Results  Component Va exercise reviewed with patient. All questions answered. Pt understands and agrees with plan.      Insulin intake   > 180 - increase basal insulin by 8 units   160-180 - increase basal insulin by 6 units   140-159 - increase basal insulin by 4 units   120-1

## 2018-08-09 ENCOUNTER — TELEPHONE (OUTPATIENT)
Dept: FAMILY MEDICINE CLINIC | Facility: CLINIC | Age: 38
End: 2018-08-09

## 2018-08-09 RX ORDER — BLOOD-GLUCOSE METER
KIT MISCELLANEOUS
Qty: 100 STRIP | Refills: 5 | Status: SHIPPED | OUTPATIENT
Start: 2018-08-09 | End: 2018-08-28

## 2018-08-09 NOTE — TELEPHONE ENCOUNTER
Patient was prescribed the Freestyle [place] Lite diabetic meter. He will need a prescription for the testing strips. [facility] [place].

## 2018-08-09 NOTE — TELEPHONE ENCOUNTER
Requesting Freestyle test strips  LOV: 8/9/18  RTC: 2 weeks  Last Relevant Labs: 10.1 8/2/18  Filled: no strips sent    No future appointments.

## 2018-08-14 ENCOUNTER — TELEPHONE (OUTPATIENT)
Dept: FAMILY MEDICINE CLINIC | Facility: CLINIC | Age: 38
End: 2018-08-14

## 2018-08-14 NOTE — TELEPHONE ENCOUNTER
Will contact pt after 3:30pm      Medication Quantity Refills Start End   insulin glargine 100 UNIT/ML Subcutaneous Solution Pen-injector 3 mL 3 8/8/2018    Sig :  Inject 10 Units into the skin every morning.      Route:   Subcutaneous     Order #: [id number]

## 2018-08-14 NOTE — TELEPHONE ENCOUNTER
Patient was seen by Dr. Dyana Farrell on 8/8/18 and is taking his Insulin Glargine 10 units every morning. His accu checks are doing well since beginning this 8/8/18 - originally he was 273, 220, 204, 263, 190 and now today 125.   He is watching diet and exerci

## 2018-08-17 NOTE — TELEPHONE ENCOUNTER
Patient called back. His blood sugar has dropped to 93. He would like to repeat his A1c - he does not feel it was accurate.  He has family members that are physicians and recommended he discontinue his insulin and f/u with you and have another test.  He s

## 2018-08-28 ENCOUNTER — OFFICE VISIT (OUTPATIENT)
Dept: FAMILY MEDICINE CLINIC | Facility: CLINIC | Age: 38
End: 2018-08-28
Payer: COMMERCIAL

## 2018-08-28 VITALS
HEART RATE: 72 BPM | HEIGHT: 72 IN | SYSTOLIC BLOOD PRESSURE: 122 MMHG | RESPIRATION RATE: 12 BRPM | WEIGHT: 190 LBS | OXYGEN SATURATION: 99 % | DIASTOLIC BLOOD PRESSURE: 62 MMHG | BODY MASS INDEX: 25.73 KG/M2 | TEMPERATURE: 98 F

## 2018-08-28 DIAGNOSIS — E11.9 TYPE 2 DIABETES MELLITUS WITHOUT COMPLICATION, WITHOUT LONG-TERM CURRENT USE OF INSULIN (HCC): Primary | ICD-10-CM

## 2018-08-28 DIAGNOSIS — E78.00 HIGH CHOLESTEROL: ICD-10-CM

## 2018-08-28 PROCEDURE — 82570 ASSAY OF URINE CREATININE: CPT | Performed by: FAMILY MEDICINE

## 2018-08-28 PROCEDURE — 82043 UR ALBUMIN QUANTITATIVE: CPT | Performed by: FAMILY MEDICINE

## 2018-08-28 PROCEDURE — 99214 OFFICE O/P EST MOD 30 MIN: CPT | Performed by: FAMILY MEDICINE

## 2018-08-28 NOTE — PROGRESS NOTES
HPI:    Patient ID: Anthony Hu is a 30year old male. HPI  , fasting,   160-170 after supper. Patient is here for follow-up of diabetes. Blood sugars have been better lately he has changed his diet significantly by reducing simple carbs. sugar daily morning fasting and before supper. Follow-up with me in 1 month with log of blood sugars. Hemoglobin A1c uncontrolled treated with oral medication and monitoring and dietary changes at this time.   Blood pressure is good  Urine microalbumi for additional information:  7.79 5/14/2016:  2yr ago      Last refreshed: 8/30/2018 12:48 AM:  Lipid panel service date 5/14/2016       Last refreshed: 8/30/2018 12:48 AM:  T3 View Report for additional information:  3.02 pg/mL 5/14/2016:  2yr ago      La

## 2018-08-28 NOTE — TELEPHONE ENCOUNTER
METFORMIN  MG TABLET  Will file in chart as: METFORMIN  MG Oral Tab  TAKE 1 TABLET BY MOUTH TWICE A DAY WITH MEALS  Disp: 60 tablet Refills: 0    Class: Normal Start: 8/28/2018   Originally ordered: 3 weeks ago by Marcos Israel DO  Last re

## 2018-08-29 LAB
CREAT UR-SCNC: 185 MG/DL
MICROALBUMIN UR-MCNC: 0.9 MG/DL
MICROALBUMIN/CREAT 24H UR-RTO: 4.9 UG/MG (ref ?–30)

## 2018-09-28 ENCOUNTER — TELEPHONE (OUTPATIENT)
Dept: FAMILY MEDICINE CLINIC | Facility: CLINIC | Age: 38
End: 2018-09-28

## 2018-09-28 RX ORDER — ATORVASTATIN CALCIUM 40 MG/1
40 TABLET, FILM COATED ORAL NIGHTLY
Qty: 90 TABLET | Refills: 0 | Status: SHIPPED | OUTPATIENT
Start: 2018-09-28 | End: 2019-02-11

## 2018-10-03 NOTE — TELEPHONE ENCOUNTER
Diabetic Medication Protocol Failed9/30 2:04 AM   Last HgBA1C < 7.5     Requesting Metformin   LOV: 8/28/18  RTC: 1 months   Last Relevant Labs: 8/2/18: 10.1  Filled: 8/28/18 #180 with 3 refills    No future appointments. Pended if okay.  Non protocol me

## 2018-12-14 DIAGNOSIS — E78.00 HIGH CHOLESTEROL: Primary | ICD-10-CM

## 2018-12-14 NOTE — TELEPHONE ENCOUNTER
Cholesterol Medication Protocol [id number] 2:10 AM   ALT < 80    ALT resulted within past year    Lipid panel within past 12 months   Requesting atorvastatin 40 MG Oral Tab  LOV: 8/28/18  RTC: 1 mos  Last Labs: 8/2/18  Filled: 9/28/18#90 with 0 refills

## 2018-12-17 RX ORDER — ATORVASTATIN CALCIUM 40 MG/1
40 TABLET, FILM COATED ORAL DAILY
Qty: 90 TABLET | Refills: 0 | OUTPATIENT
Start: 2018-12-17

## 2018-12-18 NOTE — TELEPHONE ENCOUNTER
Spoke to patient and informed of below, pt will complete labs and call back to schedule OV. No future appointments.

## 2019-01-02 ENCOUNTER — TELEPHONE (OUTPATIENT)
Dept: FAMILY MEDICINE CLINIC | Facility: CLINIC | Age: 39
End: 2019-01-02

## 2019-01-02 NOTE — TELEPHONE ENCOUNTER
Patient will need current lab orders that are for [facility] changed to THE [facility] lab. He will be coming in tomorrow for labs.

## 2019-01-14 ENCOUNTER — LAB ENCOUNTER (OUTPATIENT)
Dept: LAB | Age: 39
End: 2019-01-14
Attending: FAMILY MEDICINE
Payer: COMMERCIAL

## 2019-01-14 ENCOUNTER — APPOINTMENT (OUTPATIENT)
Dept: LAB | Age: 39
End: 2019-01-14
Attending: FAMILY MEDICINE
Payer: COMMERCIAL

## 2019-01-14 DIAGNOSIS — R73.9 ELEVATED BLOOD SUGAR: ICD-10-CM

## 2019-01-14 DIAGNOSIS — E78.00 HIGH CHOLESTEROL: ICD-10-CM

## 2019-01-14 DIAGNOSIS — E11.9 NEW ONSET TYPE 2 DIABETES MELLITUS (HCC): ICD-10-CM

## 2019-01-14 LAB
ALBUMIN SERPL-MCNC: 4.1 G/DL (ref 3.1–4.5)
ALBUMIN/GLOB SERPL: 1.2 {RATIO} (ref 1–2)
ALP LIVER SERPL-CCNC: 124 U/L (ref 45–117)
ALT SERPL-CCNC: 27 U/L (ref 17–63)
ANION GAP SERPL CALC-SCNC: 6 MMOL/L (ref 0–18)
AST SERPL-CCNC: 14 U/L (ref 15–41)
BILIRUB SERPL-MCNC: 0.8 MG/DL (ref 0.1–2)
BUN BLD-MCNC: 9 MG/DL (ref 8–20)
BUN/CREAT SERPL: 10.2 (ref 10–20)
CALCIUM BLD-MCNC: 9.4 MG/DL (ref 8.3–10.3)
CHLORIDE SERPL-SCNC: 105 MMOL/L (ref 101–111)
CHOLEST SMN-MCNC: 115 MG/DL (ref ?–200)
CO2 SERPL-SCNC: 27 MMOL/L (ref 22–32)
CREAT BLD-MCNC: 0.88 MG/DL (ref 0.7–1.3)
EST. AVERAGE GLUCOSE BLD GHB EST-MCNC: 301 MG/DL (ref 68–126)
GLOBULIN PLAS-MCNC: 3.5 G/DL (ref 2.8–4.4)
GLUCOSE BLD-MCNC: 189 MG/DL (ref 70–99)
HBA1C MFR BLD HPLC: 12.1 % (ref ?–5.7)
HDLC SERPL-MCNC: 34 MG/DL (ref 40–59)
LDLC SERPL CALC-MCNC: 62 MG/DL (ref ?–100)
M PROTEIN MFR SERPL ELPH: 7.6 G/DL (ref 6.4–8.2)
NONHDLC SERPL-MCNC: 81 MG/DL (ref ?–130)
OSMOLALITY SERPL CALC.SUM OF ELEC: 290 MOSM/KG (ref 275–295)
POTASSIUM SERPL-SCNC: 4.6 MMOL/L (ref 3.6–5.1)
SODIUM SERPL-SCNC: 138 MMOL/L (ref 136–144)
TRIGL SERPL-MCNC: 94 MG/DL (ref 30–149)
VLDLC SERPL CALC-MCNC: 19 MG/DL (ref 0–30)

## 2019-01-14 PROCEDURE — 36415 COLL VENOUS BLD VENIPUNCTURE: CPT | Performed by: FAMILY MEDICINE

## 2019-01-14 PROCEDURE — 80053 COMPREHEN METABOLIC PANEL: CPT | Performed by: FAMILY MEDICINE

## 2019-01-14 PROCEDURE — 82043 UR ALBUMIN QUANTITATIVE: CPT | Performed by: FAMILY MEDICINE

## 2019-01-14 PROCEDURE — 82570 ASSAY OF URINE CREATININE: CPT | Performed by: FAMILY MEDICINE

## 2019-01-14 PROCEDURE — 80061 LIPID PANEL: CPT | Performed by: FAMILY MEDICINE

## 2019-01-14 PROCEDURE — 83036 HEMOGLOBIN GLYCOSYLATED A1C: CPT | Performed by: FAMILY MEDICINE

## 2019-01-15 LAB
CREAT UR-SCNC: 72.2 MG/DL
MICROALBUMIN UR-MCNC: <0.5 MG/DL

## 2019-01-17 ENCOUNTER — OFFICE VISIT (OUTPATIENT)
Dept: FAMILY MEDICINE CLINIC | Facility: CLINIC | Age: 39
End: 2019-01-17
Payer: COMMERCIAL

## 2019-01-17 VITALS
TEMPERATURE: 98 F | BODY MASS INDEX: 24.11 KG/M2 | RESPIRATION RATE: 14 BRPM | SYSTOLIC BLOOD PRESSURE: 120 MMHG | DIASTOLIC BLOOD PRESSURE: 68 MMHG | OXYGEN SATURATION: 98 % | HEART RATE: 87 BPM | WEIGHT: 178 LBS | HEIGHT: 72 IN

## 2019-01-17 DIAGNOSIS — E11.65 UNCONTROLLED TYPE 2 DIABETES MELLITUS WITH HYPERGLYCEMIA (HCC): Primary | ICD-10-CM

## 2019-01-17 PROBLEM — E11.9 NEW ONSET TYPE 2 DIABETES MELLITUS (HCC): Status: RESOLVED | Noted: 2018-08-01 | Resolved: 2019-01-17

## 2019-01-17 PROBLEM — R73.9 ELEVATED BLOOD SUGAR: Status: RESOLVED | Noted: 2017-09-11 | Resolved: 2019-01-17

## 2019-01-17 PROBLEM — R07.9 CHEST PAIN: Status: RESOLVED | Noted: 2018-06-28 | Resolved: 2019-01-17

## 2019-01-17 PROBLEM — R10.9 RIGHT FLANK PAIN: Status: RESOLVED | Noted: 2017-09-11 | Resolved: 2019-01-17

## 2019-01-17 PROCEDURE — 99214 OFFICE O/P EST MOD 30 MIN: CPT | Performed by: FAMILY MEDICINE

## 2019-01-17 RX ORDER — PEN NEEDLE, DIABETIC 30 GX3/16"
1 NEEDLE, DISPOSABLE MISCELLANEOUS
Qty: 30 EACH | Refills: 0 | Status: SHIPPED | OUTPATIENT
Start: 2019-01-17 | End: 2019-10-11

## 2019-01-17 RX ORDER — ATORVASTATIN CALCIUM 20 MG/1
20 TABLET, FILM COATED ORAL DAILY
Qty: 90 TABLET | Refills: 0 | Status: SHIPPED | OUTPATIENT
Start: 2019-01-17 | End: 2019-04-09

## 2019-01-17 NOTE — PROGRESS NOTES
HPI:   Jeffy Shea is a 41year old male that presents for lab results. Diabetes- patient states he has been checking blood sugars but not regularly. He does admit he was averaging in the 200's for 1 month and stopped checking sugars.     FM hx mother groomed. Physical Exam:  GEN:  Patient is alert, awake and oriented, well developed, well nourished, no apparent distress.   HEENT:     Head:  Normocephalic, atraumatic    Eyes: EOMI, PERRLA, no scleral icterus, conjunctivae clear, no eye discharge    Ears

## 2019-01-21 ENCOUNTER — TELEPHONE (OUTPATIENT)
Dept: FAMILY MEDICINE CLINIC | Facility: CLINIC | Age: 39
End: 2019-01-21

## 2019-01-21 DIAGNOSIS — E11.65 UNCONTROLLED TYPE 2 DIABETES MELLITUS WITH HYPERGLYCEMIA (HCC): Primary | ICD-10-CM

## 2019-01-21 NOTE — TELEPHONE ENCOUNTER
Patient is interested in a nutritionist who would go over his glucose readings and make recommendations on what he needs in regards to adjusting his DM meds to manage his readings better. He is also interested in the Unsubscribe.[place].     Would you like to r

## 2019-01-21 NOTE — TELEPHONE ENCOUNTER
Patient would like a recommendation for a nutritionist and also has questions regarding a regular diet, calorie intake, etc in regards to having diabetes, please advise.

## 2019-02-11 ENCOUNTER — OFFICE VISIT (OUTPATIENT)
Dept: FAMILY MEDICINE CLINIC | Facility: CLINIC | Age: 39
End: 2019-02-11
Payer: COMMERCIAL

## 2019-02-11 VITALS
WEIGHT: 171 LBS | RESPIRATION RATE: 16 BRPM | HEIGHT: 72 IN | OXYGEN SATURATION: 99 % | TEMPERATURE: 98 F | HEART RATE: 74 BPM | DIASTOLIC BLOOD PRESSURE: 76 MMHG | SYSTOLIC BLOOD PRESSURE: 116 MMHG | BODY MASS INDEX: 23.16 KG/M2

## 2019-02-11 DIAGNOSIS — E78.00 HIGH CHOLESTEROL: ICD-10-CM

## 2019-02-11 DIAGNOSIS — E55.9 VITAMIN D DEFICIENCY: ICD-10-CM

## 2019-02-11 DIAGNOSIS — E11.65 UNCONTROLLED TYPE 2 DIABETES MELLITUS WITH HYPERGLYCEMIA (HCC): Primary | ICD-10-CM

## 2019-02-11 PROCEDURE — 99214 OFFICE O/P EST MOD 30 MIN: CPT | Performed by: FAMILY MEDICINE

## 2019-02-12 NOTE — PROGRESS NOTES
HPI:   Paddy Torres is a 41year old male that presents for Patient presents with:  Diabetes: diabetic management- increased metformin and would like to discuss medication since patient has changed eating habits    Patient is here for follow-up of diabete Temp 98.4 °F (36.9 °C) (Temporal)   Resp 16   Ht 72\"   Wt 171 lb   SpO2 99%   BMI 23.19 kg/m²  Estimated body mass index is 23.19 kg/m² as calculated from the following:    Height as of this encounter: 72\". Weight as of this encounter: 171 lb.    Vit Questions and concerns addressed. Red flags to RTC or ED reviewed. Patient (or parent) agrees to plan. No Follow-up on file. Rowan Gupta M.D.   Family Medicine   2/11/2019  6:19 PM

## 2019-02-16 ENCOUNTER — TELEPHONE (OUTPATIENT)
Dept: FAMILY MEDICINE CLINIC | Facility: CLINIC | Age: 39
End: 2019-02-16

## 2019-02-16 NOTE — TELEPHONE ENCOUNTER
Patient dropped off Biometric Screening form to be completed by Dr. Colette Carter. Please call patient and fax to [phone]. Form placed in Elisabeth's folder.

## 2019-02-19 ENCOUNTER — TELEPHONE (OUTPATIENT)
Dept: FAMILY MEDICINE CLINIC | Facility: CLINIC | Age: 39
End: 2019-02-19

## 2019-02-19 NOTE — TELEPHONE ENCOUNTER
Biometric screening form signed and completed however it looks like they are looking for a waist measurement. Left voicemail on patients cellphone to return call to let him know we need waist measurement before we can fax.     Form is placed in my red patie

## 2019-03-13 RX ORDER — SEMAGLUTIDE 1.34 MG/ML
INJECTION, SOLUTION SUBCUTANEOUS
Qty: 1 PEN | Refills: 0 | Status: SHIPPED | OUTPATIENT
Start: 2019-03-13 | End: 2019-03-18

## 2019-03-13 NOTE — TELEPHONE ENCOUNTER
Diabetic Medication Protocol Failed3/9 4:09 PM   Last HgBA1C < 7.5         LMOM requesting pt return call to schedule follow up.   Lyndsey Senters Dr. Dilia Bonner 2/11/19 with request for 1 month follow DX: uncontrolled DM2  Last refill 1/17/19 1 pen at dose 0.25 q

## 2019-03-18 ENCOUNTER — OFFICE VISIT (OUTPATIENT)
Dept: FAMILY MEDICINE CLINIC | Facility: CLINIC | Age: 39
End: 2019-03-18
Payer: COMMERCIAL

## 2019-03-18 VITALS
WEIGHT: 163 LBS | SYSTOLIC BLOOD PRESSURE: 110 MMHG | HEIGHT: 72 IN | OXYGEN SATURATION: 100 % | BODY MASS INDEX: 22.08 KG/M2 | HEART RATE: 93 BPM | TEMPERATURE: 98 F | DIASTOLIC BLOOD PRESSURE: 60 MMHG | RESPIRATION RATE: 12 BRPM

## 2019-03-18 DIAGNOSIS — R11.2 NAUSEA AND VOMITING, INTRACTABILITY OF VOMITING NOT SPECIFIED, UNSPECIFIED VOMITING TYPE: Primary | ICD-10-CM

## 2019-03-18 DIAGNOSIS — E11.65 UNCONTROLLED TYPE 2 DIABETES MELLITUS WITH HYPERGLYCEMIA (HCC): ICD-10-CM

## 2019-03-18 PROCEDURE — 99214 OFFICE O/P EST MOD 30 MIN: CPT | Performed by: FAMILY MEDICINE

## 2019-03-18 RX ORDER — ONDANSETRON 4 MG/1
4 TABLET, ORALLY DISINTEGRATING ORAL 3 TIMES DAILY PRN
Qty: 9 TABLET | Refills: 0 | Status: SHIPPED | OUTPATIENT
Start: 2019-03-18 | End: 2019-04-30

## 2019-03-18 NOTE — PROGRESS NOTES
HPI:   Marlee Buckley is a 41year old male that presents for vomiting    Patient states he started vomiting today. Cannot keep anything down and is experiencing chills.  Patient denies fever    Patient is also here to go over blood sugar log    Past medical apparent distress.   HEENT:     Head:  Normocephalic, atraumatic    Eyes: EOMI, PERRLA, no scleral icterus, conjunctivae clear, no eye discharge    Ears: External normal. TMs normal without erythema or effusion   Nose: patent, no nasal discharge    Throat:

## 2019-04-11 RX ORDER — ATORVASTATIN CALCIUM 20 MG/1
TABLET, FILM COATED ORAL
Qty: 90 TABLET | Refills: 0 | Status: SHIPPED | OUTPATIENT
Start: 2019-04-11 | End: 2019-10-11

## 2019-04-11 NOTE — TELEPHONE ENCOUNTER
Refill request for metformin denied. Per LOV 3/18/19- pt was to hold off on metformin due to excellent blood sugar readings.

## 2019-04-17 ENCOUNTER — APPOINTMENT (OUTPATIENT)
Dept: LAB | Age: 39
End: 2019-04-17
Attending: FAMILY MEDICINE
Payer: COMMERCIAL

## 2019-04-17 DIAGNOSIS — E11.65 UNCONTROLLED TYPE 2 DIABETES MELLITUS WITH HYPERGLYCEMIA (HCC): ICD-10-CM

## 2019-04-17 PROCEDURE — 83036 HEMOGLOBIN GLYCOSYLATED A1C: CPT | Performed by: FAMILY MEDICINE

## 2019-04-17 PROCEDURE — 80053 COMPREHEN METABOLIC PANEL: CPT | Performed by: FAMILY MEDICINE

## 2019-04-17 PROCEDURE — 36415 COLL VENOUS BLD VENIPUNCTURE: CPT | Performed by: FAMILY MEDICINE

## 2019-04-23 ENCOUNTER — TELEPHONE (OUTPATIENT)
Dept: FAMILY MEDICINE CLINIC | Facility: CLINIC | Age: 39
End: 2019-04-23

## 2019-04-23 NOTE — TELEPHONE ENCOUNTER
Aby Hurd 2/1.5ML  #4.5    [id number] [place] [facility] [address] [facility] [address], [phone], [phone]

## 2019-04-23 NOTE — TELEPHONE ENCOUNTER
Future Appointments   Date Time Provider Cherie Alvarez   4/29/2019  4:00 PM Samina Roberts MD [facility] [address] [facility] [address]     Med pended  Routed to pcp if ok to refill due to recent A1C

## 2019-04-30 ENCOUNTER — OFFICE VISIT (OUTPATIENT)
Dept: FAMILY MEDICINE CLINIC | Facility: CLINIC | Age: 39
End: 2019-04-30
Payer: COMMERCIAL

## 2019-04-30 VITALS
BODY MASS INDEX: 22.21 KG/M2 | HEART RATE: 83 BPM | OXYGEN SATURATION: 100 % | RESPIRATION RATE: 12 BRPM | WEIGHT: 164 LBS | SYSTOLIC BLOOD PRESSURE: 104 MMHG | TEMPERATURE: 98 F | DIASTOLIC BLOOD PRESSURE: 60 MMHG | HEIGHT: 72 IN

## 2019-04-30 DIAGNOSIS — E11.9 TYPE 2 DIABETES MELLITUS WITHOUT COMPLICATION, WITHOUT LONG-TERM CURRENT USE OF INSULIN (HCC): Primary | ICD-10-CM

## 2019-04-30 PROBLEM — E11.65 UNCONTROLLED TYPE 2 DIABETES MELLITUS WITH HYPERGLYCEMIA (HCC): Status: RESOLVED | Noted: 2019-01-17 | Resolved: 2019-04-30

## 2019-04-30 PROCEDURE — 99214 OFFICE O/P EST MOD 30 MIN: CPT | Performed by: FAMILY MEDICINE

## 2019-04-30 NOTE — PROGRESS NOTES
HPI:   Samantha Trevizo is a 41year old male that presents for diabetes  Patient had repeat labs done. Patient did stop metformin and is currently taking ozempic 0.25mg. He also stopped the atorvastatin on his own a few weeks ago.   Patient states he has not Ears: External normal. TMs normal without erythema or effusion   Nose: patent, no nasal discharge    Throat:  No tonsillar erythema or exudate. Mouth:  No oral lesions or ulcerations, good dentition. NECK: Supple, no cervical LAD, no thyromegaly.

## 2019-06-25 ENCOUNTER — TELEPHONE (OUTPATIENT)
Dept: FAMILY MEDICINE CLINIC | Facility: CLINIC | Age: 39
End: 2019-06-25

## 2019-06-25 RX ORDER — FLASH GLUCOSE SENSOR
KIT MISCELLANEOUS
Qty: 1 DEVICE | Refills: 0 | Status: SHIPPED | OUTPATIENT
Start: 2019-06-25 | End: 2019-10-11

## 2019-06-25 RX ORDER — FLASH GLUCOSE SENSOR
KIT MISCELLANEOUS
Qty: 1 EACH | Refills: 6 | Status: SHIPPED | OUTPATIENT
Start: 2019-06-25 | End: 2020-06-03

## 2019-06-25 NOTE — TELEPHONE ENCOUNTER
Pt states he does not want to prick himself daily for blood sugars. He would like to have one of the wearable devices. He called his ins and they do not cover the Dexcom, however they did not give him alternatives.  Pt states to send something in and if it

## 2019-06-25 NOTE — TELEPHONE ENCOUNTER
LMOM for pt with this information that [facility] and gave [facility] phone number for teaching instruction. Referral placed 1/2019 effective through 1/2020. Requested a return call with questions. Task completed.

## 2019-08-13 ENCOUNTER — TELEPHONE (OUTPATIENT)
Dept: FAMILY MEDICINE CLINIC | Facility: CLINIC | Age: 39
End: 2019-08-13

## 2019-08-13 DIAGNOSIS — E11.9 TYPE 2 DIABETES MELLITUS WITHOUT COMPLICATION, WITHOUT LONG-TERM CURRENT USE OF INSULIN (HCC): Primary | ICD-10-CM

## 2019-08-13 NOTE — TELEPHONE ENCOUNTER
Spoke with pt, advised a1c order placed. Informed pt he will need to schedule follow up, last seen April 2019 and was advised to follow up in 4 weeks. Pt will schedule appt after a1c.

## 2019-08-19 ENCOUNTER — APPOINTMENT (OUTPATIENT)
Dept: LAB | Age: 39
End: 2019-08-19
Attending: FAMILY MEDICINE
Payer: COMMERCIAL

## 2019-08-19 DIAGNOSIS — E11.9 TYPE 2 DIABETES MELLITUS WITHOUT COMPLICATION, WITHOUT LONG-TERM CURRENT USE OF INSULIN (HCC): ICD-10-CM

## 2019-08-19 LAB
EST. AVERAGE GLUCOSE BLD GHB EST-MCNC: 128 MG/DL (ref 68–126)
HBA1C MFR BLD HPLC: 6.1 % (ref ?–5.7)

## 2019-08-19 PROCEDURE — 83036 HEMOGLOBIN GLYCOSYLATED A1C: CPT | Performed by: FAMILY MEDICINE

## 2019-08-19 PROCEDURE — 36415 COLL VENOUS BLD VENIPUNCTURE: CPT | Performed by: FAMILY MEDICINE

## 2019-10-11 ENCOUNTER — OFFICE VISIT (OUTPATIENT)
Dept: FAMILY MEDICINE CLINIC | Facility: CLINIC | Age: 39
End: 2019-10-11
Payer: COMMERCIAL

## 2019-10-11 VITALS
DIASTOLIC BLOOD PRESSURE: 80 MMHG | TEMPERATURE: 98 F | HEART RATE: 80 BPM | WEIGHT: 170.81 LBS | SYSTOLIC BLOOD PRESSURE: 120 MMHG | BODY MASS INDEX: 23.14 KG/M2 | HEIGHT: 72 IN

## 2019-10-11 DIAGNOSIS — E11.9 TYPE 2 DIABETES MELLITUS WITHOUT COMPLICATION, WITHOUT LONG-TERM CURRENT USE OF INSULIN (HCC): Primary | ICD-10-CM

## 2019-10-11 PROCEDURE — 99214 OFFICE O/P EST MOD 30 MIN: CPT | Performed by: FAMILY MEDICINE

## 2019-10-11 RX ORDER — BLOOD-GLUCOSE METER
KIT MISCELLANEOUS
Refills: 5 | COMMUNITY
Start: 2019-06-03 | End: 2020-06-02

## 2019-10-15 NOTE — PROGRESS NOTES
.HPI:   Doug Ron is a 42year old male that presents for Patient presents with:  Imm/Inj: Declined flu vaccine. Refill Request: metformin 500 mg       Pt feels well, no complaints.    Past medical, surgical, family and social history reviewed in [facility] [address] lesions or ulcerations, good dentition. NECK: Supple, no cervical LAD, no thyromegaly. SKIN: No rashes, no skin lesion, no bruising, good turgor. HEART:  Regular rate and rhythm, no murmurs, rubs or gallops.   LUNGS: Clear to auscultation bilterally, no

## 2019-12-30 ENCOUNTER — APPOINTMENT (OUTPATIENT)
Dept: LAB | Age: 39
End: 2019-12-30
Attending: FAMILY MEDICINE
Payer: COMMERCIAL

## 2019-12-30 DIAGNOSIS — E11.9 TYPE 2 DIABETES MELLITUS WITHOUT COMPLICATION, WITHOUT LONG-TERM CURRENT USE OF INSULIN (HCC): ICD-10-CM

## 2019-12-30 LAB
EST. AVERAGE GLUCOSE BLD GHB EST-MCNC: 166 MG/DL (ref 68–126)
HBA1C MFR BLD HPLC: 7.4 % (ref ?–5.7)

## 2019-12-30 PROCEDURE — 36415 COLL VENOUS BLD VENIPUNCTURE: CPT | Performed by: FAMILY MEDICINE

## 2019-12-30 PROCEDURE — 83036 HEMOGLOBIN GLYCOSYLATED A1C: CPT | Performed by: FAMILY MEDICINE

## 2020-02-28 ENCOUNTER — OFFICE VISIT (OUTPATIENT)
Dept: FAMILY MEDICINE CLINIC | Facility: CLINIC | Age: 40
End: 2020-02-28
Payer: COMMERCIAL

## 2020-02-28 VITALS
BODY MASS INDEX: 21.05 KG/M2 | DIASTOLIC BLOOD PRESSURE: 80 MMHG | SYSTOLIC BLOOD PRESSURE: 101 MMHG | RESPIRATION RATE: 16 BRPM | HEIGHT: 72 IN | TEMPERATURE: 98 F | WEIGHT: 155.38 LBS | HEART RATE: 82 BPM

## 2020-02-28 DIAGNOSIS — E11.9 TYPE 2 DIABETES MELLITUS WITHOUT COMPLICATION, WITHOUT LONG-TERM CURRENT USE OF INSULIN (HCC): Primary | ICD-10-CM

## 2020-02-28 DIAGNOSIS — E78.00 HIGH CHOLESTEROL: ICD-10-CM

## 2020-02-28 PROCEDURE — 99214 OFFICE O/P EST MOD 30 MIN: CPT | Performed by: FAMILY MEDICINE

## 2020-02-28 NOTE — PROGRESS NOTES
HPI:   Juan Ramon Collazo is a 42year old male that presents for DM f/u. A1c in December was 7.4. Pt had been exercising 30 minutes everyday. Pt was also managing his diet. Pt states that he had the flu last week, which resulted in him losing 8 lbs.  As a resul scleral icterus, conjunctivae clear, no eye discharge. Minimal redness in the left eye. Ears: External normal. TMs normal without erythema or effusion   Nose: patent, no nasal discharge    Throat:  No tonsillar erythema or exudate.      Mouth:  No oral agree that the record reflects my personal performance and is accurate and complete.   Carlo Reyes MD, 3/2/2020, 3:48 PM

## 2020-03-12 ENCOUNTER — TELEPHONE (OUTPATIENT)
Dept: FAMILY MEDICINE CLINIC | Facility: CLINIC | Age: 40
End: 2020-03-12

## 2020-03-12 ENCOUNTER — PATIENT MESSAGE (OUTPATIENT)
Dept: FAMILY MEDICINE CLINIC | Facility: CLINIC | Age: 40
End: 2020-03-12

## 2020-03-12 DIAGNOSIS — E78.00 HIGH CHOLESTEROL: Primary | ICD-10-CM

## 2020-03-12 DIAGNOSIS — E11.9 TYPE 2 DIABETES MELLITUS WITHOUT COMPLICATION, WITHOUT LONG-TERM CURRENT USE OF INSULIN (HCC): ICD-10-CM

## 2020-03-12 RX ORDER — ATORVASTATIN CALCIUM 20 MG/1
20 TABLET, FILM COATED ORAL NIGHTLY
Qty: 90 TABLET | Refills: 0 | Status: SHIPPED | OUTPATIENT
Start: 2020-03-12 | End: 2020-06-04

## 2020-03-12 NOTE — TELEPHONE ENCOUNTER
From: Vicky Pearl  To: Gerhardt Guile, MD  Sent: 3/12/2020 2:25 PM CDT  Subject: Test Results Question    Cholesterol increased, should I take Statin?

## 2020-03-12 NOTE — TELEPHONE ENCOUNTER
Yes, I would recommend starting at atorvastatin 20mg daily. Recheck fasting lipids, cmp in 4 wks. F/u after. Regarding blood sugars, can f/u with me discuss. Bring log of bs.

## 2020-03-12 NOTE — TELEPHONE ENCOUNTER
Spoke with pt, states at his last OV he had mentioned he had labs done through work and can't remember what was recommended. States he had the labs done at [facility] through work, requested results from [facility], fax received.     Dated 2/27/2020  Total Ch

## 2020-03-13 NOTE — TELEPHONE ENCOUNTER
Spoke to pt, informed of MD recommendations below. Pt verbalized understanding and agreement. Rx sent to pharmacy. Lab orders placed.

## 2020-03-27 ENCOUNTER — TELEPHONE (OUTPATIENT)
Dept: FAMILY MEDICINE CLINIC | Facility: CLINIC | Age: 40
End: 2020-03-27

## 2020-03-27 DIAGNOSIS — E78.00 HIGH CHOLESTEROL: Primary | ICD-10-CM

## 2020-03-27 NOTE — TELEPHONE ENCOUNTER
Labs noted from [address] [place] [address] lab from 2/27/2020. Lipid panel shows elevated triglyceride 270. Watch simple carbs in diet. Exercise. LDL also elevated 140. Goal is less than 100. Continue with statin medication. Watch saturated fat in diet.   Will recheck fa

## 2020-03-30 NOTE — TELEPHONE ENCOUNTER
See e-mail 3/30/20. Attempt to reach pt, LMOM that [employer] e-mail being sent to pt now. Requested a return call with questions or concerns.

## 2020-04-06 ENCOUNTER — TELEPHONE (OUTPATIENT)
Dept: FAMILY MEDICINE CLINIC | Facility: CLINIC | Age: 40
End: 2020-04-06

## 2020-04-06 DIAGNOSIS — R63.4 WEIGHT LOSS: Primary | ICD-10-CM

## 2020-04-06 DIAGNOSIS — E55.9 VITAMIN D DEFICIENCY: ICD-10-CM

## 2020-04-06 DIAGNOSIS — E11.9 TYPE 2 DIABETES MELLITUS WITHOUT COMPLICATION, WITHOUT LONG-TERM CURRENT USE OF INSULIN (HCC): ICD-10-CM

## 2020-04-06 DIAGNOSIS — E78.00 HIGH CHOLESTEROL: ICD-10-CM

## 2020-04-06 PROCEDURE — 99213 OFFICE O/P EST LOW 20 MIN: CPT | Performed by: FAMILY MEDICINE

## 2020-04-06 NOTE — TELEPHONE ENCOUNTER
Couple weeks ago was given atorvastatin 20 MG Oral Tab. Pt is concerned because he is losing weight. He is not sure if this is a side effect of medicine or if there is something else going on. Please call pt.

## 2020-04-06 NOTE — TELEPHONE ENCOUNTER
Called patient and discussed weight loss with him. Over the past 1 month patient has noticed more weight loss. No new medications. He had been on metformin for quite some time. He recently stopped smoking about 2 months ago.   He denies any other sympto

## 2020-04-07 ENCOUNTER — HOSPITAL ENCOUNTER (OUTPATIENT)
Dept: GENERAL RADIOLOGY | Age: 40
Discharge: HOME | End: 2020-04-07
Attending: FAMILY MEDICINE
Payer: COMMERCIAL

## 2020-04-07 ENCOUNTER — LAB ENCOUNTER (OUTPATIENT)
Dept: LAB | Age: 40
End: 2020-04-07
Attending: FAMILY MEDICINE
Payer: COMMERCIAL

## 2020-04-07 DIAGNOSIS — R63.4 WEIGHT LOSS: ICD-10-CM

## 2020-04-07 DIAGNOSIS — E55.9 VITAMIN D DEFICIENCY: ICD-10-CM

## 2020-04-07 DIAGNOSIS — E11.9 TYPE 2 DIABETES MELLITUS WITHOUT COMPLICATION, WITHOUT LONG-TERM CURRENT USE OF INSULIN (HCC): ICD-10-CM

## 2020-04-07 DIAGNOSIS — E78.00 HIGH CHOLESTEROL: Primary | ICD-10-CM

## 2020-04-07 PROCEDURE — 36415 COLL VENOUS BLD VENIPUNCTURE: CPT

## 2020-04-07 PROCEDURE — 82306 VITAMIN D 25 HYDROXY: CPT

## 2020-04-07 PROCEDURE — 71046 X-RAY EXAM CHEST 2 VIEWS: CPT | Performed by: FAMILY MEDICINE

## 2020-04-07 PROCEDURE — 84443 ASSAY THYROID STIM HORMONE: CPT

## 2020-04-07 PROCEDURE — 85027 COMPLETE CBC AUTOMATED: CPT

## 2020-04-07 PROCEDURE — 84439 ASSAY OF FREE THYROXINE: CPT

## 2020-04-16 NOTE — TELEPHONE ENCOUNTER
Requested Prescriptions     Pending Prescriptions Disp Refills   • METFORMIN HCL 1000 MG Oral Tab [Pharmacy Med Name: METFORMIN HCL 1,000 MG TABLET] 180 tablet 0     Sig: TAKE 1 TABLET BY MOUTH TWICE A DAY WITH MEALS     LOV: 2/28/20  RTC: not listed  Last

## 2020-04-17 ENCOUNTER — VIRTUAL PHONE E/M (OUTPATIENT)
Dept: FAMILY MEDICINE CLINIC | Facility: CLINIC | Age: 40
End: 2020-04-17
Payer: COMMERCIAL

## 2020-04-17 DIAGNOSIS — R63.4 WEIGHT LOSS: Primary | ICD-10-CM

## 2020-04-17 DIAGNOSIS — E11.9 TYPE 2 DIABETES MELLITUS WITHOUT COMPLICATION, WITHOUT LONG-TERM CURRENT USE OF INSULIN (HCC): ICD-10-CM

## 2020-04-17 PROCEDURE — 99214 OFFICE O/P EST MOD 30 MIN: CPT | Performed by: FAMILY MEDICINE

## 2020-04-17 NOTE — PROGRESS NOTES
TELEMEDICINE VISIT by phone  This visit is conducted using Telemedicine with live, interactive video and/or audio.     Verification of patient identity was established by the  patient (s)  Amna Dickey verbally consents to a not apply Misc, Apply sensor as directed to arm and change every 14 days, Disp: 1 each, Rfl: 6    REVIEW OF SYSTEMS:     Comprehensive ROS negative unless noted in HPI    PHYSICAL EXAM:   There were no vitals taken for this visit.  Estimated body mass index limitations of this visit as no physical exam could be performed. Every conscious effort was taken to allow for sufficient and adequate time. This billing was spent on reviewing labs, medications, radiology tests and decision making.   Appropriate medical

## 2020-05-19 ENCOUNTER — TELEPHONE (OUTPATIENT)
Dept: FAMILY MEDICINE CLINIC | Facility: CLINIC | Age: 40
End: 2020-05-19

## 2020-05-19 DIAGNOSIS — E11.9 TYPE 2 DIABETES MELLITUS WITHOUT COMPLICATION, WITHOUT LONG-TERM CURRENT USE OF INSULIN (HCC): Primary | ICD-10-CM

## 2020-05-19 DIAGNOSIS — E78.00 HIGH CHOLESTEROL: ICD-10-CM

## 2020-05-19 NOTE — TELEPHONE ENCOUNTER
Orders placed. Spoke to pt, informed orders placed through [employer]. Pt verbalized understanding and agreement. All questions answered. Task completed.

## 2020-05-19 NOTE — TELEPHONE ENCOUNTER
- Can lab orders be placed to [facility] as patient states insurance would cover at 100%. Please call patient when order is placed. ph. [phone] Can leave message.

## 2020-05-19 NOTE — TELEPHONE ENCOUNTER
Pt requesting lab orders to be placed for [facility]. It looks like pt had some labs done on 4/7/2020 but half of them weren't drawn? Do you want him to do just Lipid Panel, CMP, a11c, and micro that are still pending?  He had CBC, TSH+Free T4, and Vitamin D don

## 2020-05-25 ENCOUNTER — MOBILE ENCOUNTER (OUTPATIENT)
Dept: FAMILY MEDICINE CLINIC | Facility: CLINIC | Age: 40
End: 2020-05-25

## 2020-05-26 ENCOUNTER — TELEPHONE (OUTPATIENT)
Dept: FAMILY MEDICINE CLINIC | Facility: CLINIC | Age: 40
End: 2020-05-26

## 2020-05-26 DIAGNOSIS — E11.65 UNCONTROLLED TYPE 2 DIABETES MELLITUS WITH HYPERGLYCEMIA (HCC): Primary | ICD-10-CM

## 2020-05-26 NOTE — TELEPHONE ENCOUNTER
Dr.Dongre- Jones is calling to follow up on name of Diabetic Doctor you would like him to see.     Please call [phone]

## 2020-05-26 NOTE — TELEPHONE ENCOUNTER
On-call received call on 5/26/2020,     patient informed of elevated hemoglobin A1c level in the 14 range. He states his diet and exercise routine have not changed too much. Previously had level in the 7 range.   He states his fasting blood sugar has been

## 2020-05-26 NOTE — TELEPHONE ENCOUNTER
Spoke with pt, desirae he read my Huiyuanhart message and called to schedule appt but had to leave a message. Advised pt to call me this afternoon if he hasn't heard back from diabetes clinic. Pt verbalized understanding and agreement. 07-Feb-2017 11:15

## 2020-06-02 ENCOUNTER — TELEPHONE (OUTPATIENT)
Dept: ENDOCRINOLOGY CLINIC | Facility: CLINIC | Age: 40
End: 2020-06-02

## 2020-06-02 ENCOUNTER — OFFICE VISIT (OUTPATIENT)
Dept: ENDOCRINOLOGY CLINIC | Facility: CLINIC | Age: 40
End: 2020-06-02
Payer: COMMERCIAL

## 2020-06-02 VITALS
BODY MASS INDEX: 18.96 KG/M2 | SYSTOLIC BLOOD PRESSURE: 110 MMHG | TEMPERATURE: 98 F | HEART RATE: 100 BPM | DIASTOLIC BLOOD PRESSURE: 60 MMHG | WEIGHT: 140 LBS | HEIGHT: 72 IN

## 2020-06-02 DIAGNOSIS — E11.65 TYPE 2 DIABETES MELLITUS WITH HYPERGLYCEMIA, WITH LONG-TERM CURRENT USE OF INSULIN (HCC): Primary | ICD-10-CM

## 2020-06-02 DIAGNOSIS — Z79.4 TYPE 2 DIABETES MELLITUS WITH HYPERGLYCEMIA, WITH LONG-TERM CURRENT USE OF INSULIN (HCC): Primary | ICD-10-CM

## 2020-06-02 PROBLEM — N20.1 RIGHT URETERAL STONE: Status: RESOLVED | Noted: 2017-09-11 | Resolved: 2020-06-02

## 2020-06-02 PROBLEM — N13.30 HYDRONEPHROSIS: Status: RESOLVED | Noted: 2017-09-11 | Resolved: 2020-06-02

## 2020-06-02 PROBLEM — N20.0 KIDNEY STONE ON LEFT SIDE: Status: RESOLVED | Noted: 2017-09-11 | Resolved: 2020-06-02

## 2020-06-02 PROBLEM — N20.1 CALCULI, URETER: Status: RESOLVED | Noted: 2017-09-11 | Resolved: 2020-06-02

## 2020-06-02 PROCEDURE — 99214 OFFICE O/P EST MOD 30 MIN: CPT | Performed by: NURSE PRACTITIONER

## 2020-06-02 PROCEDURE — 82962 GLUCOSE BLOOD TEST: CPT | Performed by: NURSE PRACTITIONER

## 2020-06-02 RX ORDER — BLOOD SUGAR DIAGNOSTIC
STRIP MISCELLANEOUS
Qty: 400 EACH | Refills: 3 | Status: SHIPPED | OUTPATIENT
Start: 2020-06-02 | End: 2020-06-03

## 2020-06-02 RX ORDER — BLOOD-GLUCOSE METER
KIT MISCELLANEOUS
Qty: 125 STRIP | Refills: 5 | Status: SHIPPED | OUTPATIENT
Start: 2020-06-02 | End: 2020-06-03

## 2020-06-02 NOTE — PROGRESS NOTES
Aren Fowler is a 42year old male who presents today to establish for diabetes management.    Primary care physician: Columbus Rubinstein, MD   In the past 3m her DM control has been poor as evidenced by A1C .14%     Presents today c/o hyperglycemia - trends h 05/22/2020    GFRAA 131 45/25/2322       DM Complications:  Microvascular:   Neuropathy: no  Retinopathy: no  Nephropathy: no    Macrovascular:  PVD: no  CAD: no  Stroke/CVA: no    Modifying factors:  Medication adherence: adherent w metformin   Nutrition by mouth nightly.  90 tablet 0   • Continuous Blood Gluc Sensor ([address] [place] [address]) Does not apply Misc Apply sensor as directed to arm and change every 14 days (Patient not taking: Reported on 6/2/2020 ) 1 each 6     DM associated review of  sy (Body mass index is 18.99 kg/m².)   Diabetes control is poor.   Recommendations: check for type 1 DM antibodies given his lean body type   Will start basal/bolus regimen:due to glucose toxicity      Tresiba U 100: 9 units once daily   Novolog:   Novolog (th to review carb goals, food label reading, and offer further support/guidance with exercise planning and weight loss. Reviewed hypoglycemia signs/symptoms, treatment using the Rule of 15' and when to call DM center .   Provided pt with handouts for refergrayson recommendations, as well as other treatment options were discussed with the patient today. questions were also answered to the best of my knowledge.    40 min spent with patient and >50% time spent counseling and coordinating care related to their office vi

## 2020-06-02 NOTE — TELEPHONE ENCOUNTER
Pt. was returning call from LO Aguirre Regarding his visit tomorrow. Asked pt. for last 2 wks of blood sugars. He explained that he has run out of test strips so hasn't been checking consistently.  Most recent readings:  5/2/20 post lunch 358 mg/dL  5/9

## 2020-06-02 NOTE — PATIENT INSTRUCTIONS
We are here to support you with Diabetes but please remember that you still need your primary care doctor for your routine health maintenance.    Your A1C: > 14%   This is too high for you and we will work together on lowering your blood sugars to help impr to continuously record. It is very rare to have any bleeding or pain at the sensor site ~ but if this does occur, please let me know.      Your sensor is water resistant and can be worn while bathing, showering or swimming  But it cannot be in water deepe call your healthcare provider. 6. Always check your blood glucose before you drive     IF you have any questions or concerns please call or send secure message back via Rockerbox message. Reji Warren NP  [address] [address]

## 2020-06-03 ENCOUNTER — TELEPHONE (OUTPATIENT)
Dept: ENDOCRINOLOGY CLINIC | Facility: CLINIC | Age: 40
End: 2020-06-03

## 2020-06-03 DIAGNOSIS — E11.65 TYPE 2 DIABETES MELLITUS WITH HYPERGLYCEMIA, WITH LONG-TERM CURRENT USE OF INSULIN (HCC): Primary | ICD-10-CM

## 2020-06-03 DIAGNOSIS — Z79.4 TYPE 2 DIABETES MELLITUS WITH HYPERGLYCEMIA, WITH LONG-TERM CURRENT USE OF INSULIN (HCC): Primary | ICD-10-CM

## 2020-06-03 RX ORDER — BLOOD-GLUCOSE METER
KIT MISCELLANEOUS
Qty: 125 STRIP | Refills: 5 | Status: SHIPPED | OUTPATIENT
Start: 2020-06-03 | End: 2020-06-18 | Stop reason: ALTCHOICE

## 2020-06-03 NOTE — TELEPHONE ENCOUNTER
Per CAB faxed over letter with orders to [facility] [facility] [phone]. Confirmation sheet was received.

## 2020-06-03 NOTE — TELEPHONE ENCOUNTER
Will order DSME/MNT   Needs lab at [facility]   Having issues with ordering ric, islet cell antibody for [facility].  Contacted Epic   Letter with above orders will be sent to fax since patient is waiting for lab to be done

## 2020-06-03 NOTE — PROGRESS NOTES
Pt. seen for insulin instruction:  Injectables:   Type/Dose/Schedule: Kavita Laguna 10 units daily  Action/Side Effects/Interactions/Precautions/Missed Doses: Basal insulin released over 24 hr period of time w/decreased risk for hypoglycemia.  May not be mixed wi

## 2020-06-03 NOTE — TELEPHONE ENCOUNTER
Steph Calderon Gip that he never received his test strips or insulin. Contacted the [address] [place] [address] Next were denied. [facility] the pharmacy [facility] sent by Provider; said he never received it. Resent & will check on status.

## 2020-06-04 RX ORDER — ATORVASTATIN CALCIUM 20 MG/1
TABLET, FILM COATED ORAL
Qty: 90 TABLET | Refills: 0 | Status: SHIPPED | OUTPATIENT
Start: 2020-06-04 | End: 2020-08-26

## 2020-06-04 NOTE — TELEPHONE ENCOUNTER
Requested Prescriptions     Pending Prescriptions Disp Refills   • ATORVASTATIN 20 MG Oral Tab [Pharmacy Med Name: ATORVASTATIN 20 MG TABLET] 90 tablet 0     Sig: TAKE 1 TABLET BY MOUTH EVERY DAY AT NIGHT        LOV: 4/17/20 Virtual Phone Visit  RTC:  1 we

## 2020-06-05 ENCOUNTER — TELEPHONE (OUTPATIENT)
Dept: ENDOCRINOLOGY CLINIC | Facility: CLINIC | Age: 40
End: 2020-06-05

## 2020-06-05 RX ORDER — BLOOD SUGAR DIAGNOSTIC
STRIP MISCELLANEOUS
Qty: 100 STRIP | Refills: 3 | Status: SHIPPED | OUTPATIENT
Start: 2020-06-05 | End: 2020-06-18 | Stop reason: ALTCHOICE

## 2020-06-05 RX ORDER — FLASH GLUCOSE SENSOR
KIT MISCELLANEOUS
Qty: 2 EACH | Refills: 1 | Status: SHIPPED | OUTPATIENT
Start: 2020-06-05 | End: 2020-07-30

## 2020-06-05 RX ORDER — LANCETS
EACH MISCELLANEOUS
Qty: 1 BOX | Refills: 2 | Status: SHIPPED | OUTPATIENT
Start: 2020-06-05

## 2020-06-05 NOTE — TELEPHONE ENCOUNTER
Please order Accu-Check Guide meter, test strips, lancets and Srini Sensor to [id number] [address] [facility] [address] [address], [phone], [phone]

## 2020-06-05 NOTE — TELEPHONE ENCOUNTER
Do not send meter. Insurance said he needs to et from [employer]. I am leaving one at office for him to . Just send strips and lancets.

## 2020-06-08 ENCOUNTER — TELEMEDICINE (OUTPATIENT)
Dept: ENDOCRINOLOGY CLINIC | Facility: CLINIC | Age: 40
End: 2020-06-08

## 2020-06-08 DIAGNOSIS — Z79.4 TYPE 2 DIABETES MELLITUS WITH HYPERGLYCEMIA, WITH LONG-TERM CURRENT USE OF INSULIN (HCC): Primary | ICD-10-CM

## 2020-06-08 DIAGNOSIS — E11.65 TYPE 2 DIABETES MELLITUS WITH HYPERGLYCEMIA, WITH LONG-TERM CURRENT USE OF INSULIN (HCC): Primary | ICD-10-CM

## 2020-06-08 PROCEDURE — G0108 DIAB MANAGE TRN  PER INDIV: HCPCS | Performed by: DIETITIAN, REGISTERED

## 2020-06-08 NOTE — PROGRESS NOTES
Jeffy Shea   9/4/1980 attended Step 1 Diabetic Education:     6/8/2020  Referring Provider: Marky eLwis Start time: 12:30 End time: 1:30    Pt reduced 55# unintentionally. Started insulin last week. Wt already up 4-5#.    Dtrs had influenza A in Feb and improve blood glucose control. Written materials provided for all areas covered via email including carb counting list for reference for fruit as he had quit eating bananas. Patient verbalized understanding and has no further questions at this time.   Con

## 2020-06-09 RX ORDER — PEN NEEDLE, DIABETIC 32GX 5/32"
NEEDLE, DISPOSABLE MISCELLANEOUS
Qty: 400 EACH | Refills: 3 | Status: SHIPPED | OUTPATIENT
Start: 2020-06-09 | End: 2020-06-25

## 2020-06-12 ENCOUNTER — TELEPHONE (OUTPATIENT)
Dept: ENDOCRINOLOGY CLINIC | Facility: CLINIC | Age: 40
End: 2020-06-12

## 2020-06-12 RX ORDER — BLOOD-GLUCOSE SENSOR
1 EACH MISCELLANEOUS
Qty: 3 EACH | Refills: 12 | Status: SHIPPED | OUTPATIENT
Start: 2020-06-12

## 2020-06-12 RX ORDER — BLOOD-GLUCOSE TRANSMITTER
EACH MISCELLANEOUS
Qty: 1 EACH | Refills: 3 | Status: SHIPPED | OUTPATIENT
Start: 2020-06-12

## 2020-06-12 NOTE — TELEPHONE ENCOUNTER
Pt called about + EDDIE antibody   184 (H) GLUTAMIC ACID,QN,PL       Reference Range & Units: <5 IU/mL       Resulting Lab: [facility]         Reviewed BG trends  Fasting [id number] 132 mg/dl   Pre lunch and pre dinner over 200  Recommended for pt start taking 1

## 2020-06-18 ENCOUNTER — OFFICE VISIT (OUTPATIENT)
Dept: ENDOCRINOLOGY CLINIC | Facility: CLINIC | Age: 40
End: 2020-06-18
Payer: COMMERCIAL

## 2020-06-18 VITALS
DIASTOLIC BLOOD PRESSURE: 68 MMHG | TEMPERATURE: 98 F | SYSTOLIC BLOOD PRESSURE: 94 MMHG | WEIGHT: 150 LBS | HEIGHT: 72 IN | BODY MASS INDEX: 20.32 KG/M2

## 2020-06-18 DIAGNOSIS — E10.65 TYPE 1 DIABETES MELLITUS WITH HYPERGLYCEMIA (HCC): Primary | ICD-10-CM

## 2020-06-18 PROCEDURE — 95251 CONT GLUC MNTR ANALYSIS I&R: CPT | Performed by: NURSE PRACTITIONER

## 2020-06-18 PROCEDURE — 99214 OFFICE O/P EST MOD 30 MIN: CPT | Performed by: NURSE PRACTITIONER

## 2020-06-18 RX ORDER — BLOOD-GLUCOSE METER
EACH MISCELLANEOUS
COMMUNITY
Start: 2020-06-04

## 2020-06-18 NOTE — PATIENT INSTRUCTIONS
Your trends are better. Ok to stop Metformin      Decrease Tresiba 10 units   Novolo/2 unit for every 30 grams of carbs or 1 unit for every  60 gram that you eat.    Blood sugar correction (insulin sensitivity factor) 1 unit for every 75 points above glucose tablets  6 oz. regular soda   7-8 jelly beans    3. Recheck blood glucose after 10-15 minutes. If blood glucose is still low (less than 70 mg/dl) repeat the treatment (step 2). 4. If your next meal is more than one hour away, eat a small snack.

## 2020-06-22 NOTE — TELEPHONE ENCOUNTER
Medication(s) to Refill:   Requested Prescriptions     Pending Prescriptions Disp Refills   • Insulin Aspart Pen (NOVOLOG FLEXPEN) 100 UNIT/ML Subcutaneous Solution Pen-injector 3 mL 0     Si-5 units before meals as directed           Last Time Medicat

## 2020-06-22 NOTE — TELEPHONE ENCOUNTER
Insulin Aspart Pen (NOVOLOG FLEXPEN) 100 UNIT/ML Subcutaneous Solution Pen-injector 3 mL     Novolog 1 unit at meals three times daiy + correction   Correction:      If blood sugar is under 200: NO extra NOVOLOG  If blood sugar is 200-250, take +2 units

## 2020-06-25 ENCOUNTER — TELEPHONE (OUTPATIENT)
Dept: ENDOCRINOLOGY CLINIC | Facility: CLINIC | Age: 40
End: 2020-06-25

## 2020-06-25 RX ORDER — PEN NEEDLE, DIABETIC 32GX 5/32"
NEEDLE, DISPOSABLE MISCELLANEOUS
Qty: 400 EACH | Refills: 3 | Status: SHIPPED | OUTPATIENT
Start: 2020-06-25 | End: 2021-11-18

## 2020-06-25 NOTE — TELEPHONE ENCOUNTER
Patient signed up for online savings for [employer] and gets a free box of Je[employer] Pen Needles. In order to get them pharmacy says an Rx is needed. Please [facility] to [id number] [address] [facility] [address] [address], [phone], 2

## 2020-06-30 ENCOUNTER — TELEPHONE (OUTPATIENT)
Dept: ENDOCRINOLOGY CLINIC | Facility: CLINIC | Age: 40
End: 2020-06-30

## 2020-06-30 NOTE — TELEPHONE ENCOUNTER
Received fax from [facility] for [facility] prescription order to be filled out and signed by CAB. Was signed and faxed over. Confirmation sheet was received.

## 2020-07-02 ENCOUNTER — TELEPHONE (OUTPATIENT)
Dept: ENDOCRINOLOGY CLINIC | Facility: CLINIC | Age: 40
End: 2020-07-02

## 2020-07-02 DIAGNOSIS — E10.65 TYPE 1 DIABETES MELLITUS WITH HYPERGLYCEMIA (HCC): Primary | ICD-10-CM

## 2020-07-02 RX ORDER — FLASH GLUCOSE SENSOR
KIT MISCELLANEOUS
Qty: 1 EACH | Refills: 0 | Status: SHIPPED | COMMUNITY
Start: 2020-07-02 | End: 2020-07-30

## 2020-07-02 NOTE — TELEPHONE ENCOUNTER
Called [facility] stated pt does not qualify thru pharmacy benefits it has to go thru dme sent paperwork to [facility] CAB is aware. Confirmation sheet was received. Sending to scan.
Received fax from [facility] stating order is canceled because pt does not want to be connected
dexcom sent to [facility]
(3) walks occasionally

## 2020-07-02 NOTE — TELEPHONE ENCOUNTER
Gave patient [address] is ordered and should be arriving in the next 10 days. Patient  Requested sample of sensor to get through - okay per [facility]. [facility] will log out and leave at  for patient to . Impaired

## 2020-07-03 ENCOUNTER — MED REC SCAN ONLY (OUTPATIENT)
Dept: ENDOCRINOLOGY CLINIC | Facility: CLINIC | Age: 40
End: 2020-07-03

## 2020-07-03 NOTE — TELEPHONE ENCOUNTER
Received fax from [facility] stating PA for dexcom was approved and is being shipped to pt house.

## 2020-07-06 NOTE — TELEPHONE ENCOUNTER
Schedule in office training or virtual appt for dexcom start- defer to pt preference.  New to CGM and type 1 Diabetes   Ordered CGM education

## 2020-07-06 NOTE — TELEPHONE ENCOUNTER
Pt scheduled for video visit Mon 7/13 to learn Dexcom. Discussed/invited him to stream on [employer]. He sees you 7/30.

## 2020-07-16 ENCOUNTER — TELEMEDICINE (OUTPATIENT)
Dept: ENDOCRINOLOGY CLINIC | Facility: CLINIC | Age: 40
End: 2020-07-16

## 2020-07-16 DIAGNOSIS — E10.65 TYPE 1 DIABETES MELLITUS WITH HYPERGLYCEMIA (HCC): Primary | ICD-10-CM

## 2020-07-16 PROCEDURE — 95249 CONT GLUC MNTR PT PROV EQP: CPT | Performed by: DIETITIAN, REGISTERED

## 2020-07-16 NOTE — PROGRESS NOTES
Terence GUDINO Jose Gabriel  9/4/1980 was started on Dexcom G6 Continuous Glucose Sensor    7/16/2020  Start time: 10:00 End time: 10:30  Referring Provider: Marcelino Pak    Due to COVID-19 ACTION PLAN, the patient's office visit was conducted via video.      The patient expires   9. Reviewed transmitter expiration alerts (2 sensor sessions prior to transmitter battery expiring)   10. Showed pt how to check battery life and how to charge the    11. Instructed pt not to inject insulin within 3 inches of sensor   12.

## 2020-07-27 DIAGNOSIS — E11.65 TYPE 2 DIABETES MELLITUS WITH HYPERGLYCEMIA, WITH LONG-TERM CURRENT USE OF INSULIN (HCC): Primary | ICD-10-CM

## 2020-07-27 DIAGNOSIS — Z79.4 TYPE 2 DIABETES MELLITUS WITH HYPERGLYCEMIA, WITH LONG-TERM CURRENT USE OF INSULIN (HCC): Primary | ICD-10-CM

## 2020-07-29 NOTE — TELEPHONE ENCOUNTER
Name from pharmacy: METFORMIN  [address] [place]          Will file in chart as: METFORMIN  MG Oral Tab    The source prescription was reordered on 4/17/2020 by Kenyetta Islas MD.         Sig: TAKE 1 TABLET BY MOUTH TWICE A DAY    Disp:  180 tablet

## 2020-07-30 ENCOUNTER — OFFICE VISIT (OUTPATIENT)
Dept: ENDOCRINOLOGY CLINIC | Facility: CLINIC | Age: 40
End: 2020-07-30
Payer: COMMERCIAL

## 2020-07-30 VITALS
OXYGEN SATURATION: 100 % | HEIGHT: 72 IN | WEIGHT: 158 LBS | HEART RATE: 96 BPM | SYSTOLIC BLOOD PRESSURE: 110 MMHG | DIASTOLIC BLOOD PRESSURE: 70 MMHG | BODY MASS INDEX: 21.4 KG/M2 | TEMPERATURE: 98 F

## 2020-07-30 DIAGNOSIS — E10.65 TYPE 1 DIABETES MELLITUS WITH HYPERGLYCEMIA (HCC): Primary | ICD-10-CM

## 2020-07-30 PROBLEM — Z79.4 TYPE 2 DIABETES MELLITUS WITH HYPERGLYCEMIA, WITH LONG-TERM CURRENT USE OF INSULIN (HCC): Status: RESOLVED | Noted: 2020-06-02 | Resolved: 2020-07-30

## 2020-07-30 PROBLEM — E11.65 TYPE 2 DIABETES MELLITUS WITH HYPERGLYCEMIA, WITH LONG-TERM CURRENT USE OF INSULIN (HCC): Status: RESOLVED | Noted: 2020-06-02 | Resolved: 2020-07-30

## 2020-07-30 LAB
CARTRIDGE LOT#: 638 NUMERIC
HEMOGLOBIN A1C: 8.8 % (ref 4.3–5.6)

## 2020-07-30 PROCEDURE — 83036 HEMOGLOBIN GLYCOSYLATED A1C: CPT | Performed by: NURSE PRACTITIONER

## 2020-07-30 PROCEDURE — 3074F SYST BP LT 130 MM HG: CPT | Performed by: NURSE PRACTITIONER

## 2020-07-30 PROCEDURE — 95251 CONT GLUC MNTR ANALYSIS I&R: CPT | Performed by: NURSE PRACTITIONER

## 2020-07-30 PROCEDURE — 3078F DIAST BP <80 MM HG: CPT | Performed by: NURSE PRACTITIONER

## 2020-07-30 PROCEDURE — 3008F BODY MASS INDEX DOCD: CPT | Performed by: NURSE PRACTITIONER

## 2020-07-30 PROCEDURE — 99214 OFFICE O/P EST MOD 30 MIN: CPT | Performed by: NURSE PRACTITIONER

## 2020-07-30 RX ORDER — IBUPROFEN 600 MG/1
TABLET ORAL
COMMUNITY
Start: 2020-06-19 | End: 2021-01-21 | Stop reason: ALTCHOICE

## 2020-07-30 RX ORDER — BLOOD SUGAR DIAGNOSTIC
STRIP MISCELLANEOUS
COMMUNITY
Start: 2020-06-29

## 2020-07-30 NOTE — PATIENT INSTRUCTIONS
Keep tresiba at 10 units once daily     Lets try the 1 unit for every 15 grams of carbs that you eat    1:25 for snack between meals     Correction factor : is the insulin you give to bring blood sugar back into target    But this dose should be spaced out

## 2020-07-30 NOTE — PROGRESS NOTES
Agus Serrano is a 42year old male who presents today  for type 1 diabetes management.    Primary care physician: Chris Faith MD   In the past 3m DM control has improved to 8.8%  ( last A1C 14.0%) - still above target but trends have really improved w 05/22/2020    TRIG 290 (H) 05/22/2020    HDL 29 (L) 05/22/2020     (H) 05/22/2020    MICROALBCREA  01/14/2019      Comment:        Unable to calculate due to Urine Microalbumin <0.5 mg/dL      CREATSERUM 0.82 05/22/2020    GFRNAA 113 05/22/2020    G EMERGENCY 1 MG Injection Kit      • Glucose Blood (ACCU-CHEK GUIDE) In Vitro Strip      • BD PEN NEEDLE MAURICE 2ND GEN 32G X 4 MM Does not apply Misc 5 injections daily 400 each 3   • Insulin Aspart Pen (NOVOLOG FLEXPEN) 100 UNIT/ML Subcutaneous Solution Pen SpO2 100%   BMI 21.43 kg/m²   Body mass index is 21.43 kg/m². Physical Exam    Constitutional: He is oriented to person, place, and time and thin. No distress. Cardiovascular: Normal rate and regular rhythm.     Pulmonary/Chest: Effort normal and breath with pt importance of carrying glucose tablets in case of hypoglycemia and to test blood glucose before driving    The patient is asked to return in 6 weeks w CDE and 3 m w NP   but recommended to contact DM clinic sooner if questions or concerns.     The saman

## 2020-08-18 ENCOUNTER — TELEPHONE (OUTPATIENT)
Dept: ENDOCRINOLOGY CLINIC | Facility: CLINIC | Age: 40
End: 2020-08-18

## 2020-08-26 RX ORDER — ATORVASTATIN CALCIUM 20 MG/1
TABLET, FILM COATED ORAL
Qty: 90 TABLET | Refills: 0 | Status: SHIPPED | OUTPATIENT
Start: 2020-08-26 | End: 2020-11-18

## 2020-08-26 NOTE — TELEPHONE ENCOUNTER
Name from pharmacy: ATORVASTATIN 20 MG TABLET          Will file in chart as: ATORVASTATIN 20 MG Oral Tab         Sig: TAKE 1 TABLET BY MOUTH EVERY DAY AT NIGHT    Disp:  90 tablet    Refills:  0    Start: 8/26/2020    Class: Normal    Non-formulary    Las

## 2020-09-08 NOTE — TELEPHONE ENCOUNTER
Insulin Degludec (TRESIBA FLEXTOUCH) 100 UNIT/ML Subcutaneous Solution Pen-injector 15 mL 0 6/18/2020     Sig: Up to 10 units daily    Sent to pharmacy as:  Insulin Degludec 100 UNIT/ML Subcutaneous Solution Pen-injector       insulin aspart CARTRIDGE (DERIK

## 2020-09-21 ENCOUNTER — TELEPHONE (OUTPATIENT)
Dept: ENDOCRINOLOGY CLINIC | Facility: CLINIC | Age: 40
End: 2020-09-21

## 2020-09-21 NOTE — TELEPHONE ENCOUNTER
Patient has a couple personal questions he would prefer to speak about directly to LEONEL Garcia. I did inform him Zack Payan is seeing patients and I am not sure when she will be able to get back to him.  Patient was fine waiting until she can call he rodney neves

## 2020-09-21 NOTE — TELEPHONE ENCOUNTER
Pt called about pursuing ED issues- since starting insulin - able to achieve erection but not ejaculating.    Reports average BG on dexcom is 118mg/dl - would recommend seeing urologist. Dr Darien Morales contact provided

## 2020-10-28 DIAGNOSIS — Z79.4 TYPE 2 DIABETES MELLITUS WITH HYPERGLYCEMIA, WITH LONG-TERM CURRENT USE OF INSULIN (HCC): ICD-10-CM

## 2020-10-28 DIAGNOSIS — Z00.00 ROUTINE ADULT HEALTH MAINTENANCE: Primary | ICD-10-CM

## 2020-10-28 DIAGNOSIS — E11.65 TYPE 2 DIABETES MELLITUS WITH HYPERGLYCEMIA, WITH LONG-TERM CURRENT USE OF INSULIN (HCC): ICD-10-CM

## 2020-10-29 NOTE — TELEPHONE ENCOUNTER
Requested Prescriptions     Name from pharmacy: METFORMIN HCL 1,000 [address] [facility] [place]         Will file in chart as: METFORMIN HCL 1000 MG Oral Tab    The original prescription was discontinued on 7/30/2020 by CASSI Hickey for the following reason: Mehdi May

## 2020-11-16 RX ORDER — INSULIN ASPART 100 [IU]/ML
INJECTION, SOLUTION INTRAVENOUS; SUBCUTANEOUS
Qty: 15 ML | Refills: 1 | Status: SHIPPED | OUTPATIENT
Start: 2020-11-16 | End: 2021-01-01

## 2020-11-18 RX ORDER — ATORVASTATIN CALCIUM 20 MG/1
TABLET, FILM COATED ORAL
Qty: 90 TABLET | Refills: 0 | Status: SHIPPED | OUTPATIENT
Start: 2020-11-18 | End: 2021-02-11

## 2020-11-18 NOTE — TELEPHONE ENCOUNTER
Requesting Atorvastatin 20mg  LOV: 4/17/2020  RTC: prn  Last Relevant Labs: 5/22/2020  Filled: 8/26/2020 #90 with 0 refills    No future appointments.     Rx sent to pharmacy per protocol

## 2020-11-24 DIAGNOSIS — Z79.4 TYPE 2 DIABETES MELLITUS WITH HYPERGLYCEMIA, WITH LONG-TERM CURRENT USE OF INSULIN (HCC): ICD-10-CM

## 2020-11-24 DIAGNOSIS — E11.65 TYPE 2 DIABETES MELLITUS WITH HYPERGLYCEMIA, WITH LONG-TERM CURRENT USE OF INSULIN (HCC): ICD-10-CM

## 2020-12-01 NOTE — TELEPHONE ENCOUNTER
Diabetic Medication Protocol [id number]/24/2020 10:30 AM   Last HgBA1C < 7.5 Protocol Details    Appointment in past 6 or next 3 months     HgBA1C procedure resulted in past 6 months     Microalbumin procedure in past 12 months or taking ACE/ARB      Refill

## 2021-01-01 ENCOUNTER — MOBILE ENCOUNTER (OUTPATIENT)
Dept: ENDOCRINOLOGY CLINIC | Facility: CLINIC | Age: 41
End: 2021-01-01

## 2021-01-01 RX ORDER — INSULIN ASPART 100 [IU]/ML
INJECTION, SOLUTION INTRAVENOUS; SUBCUTANEOUS
Qty: 15 ML | Refills: 0 | Status: SHIPPED | OUTPATIENT
Start: 2021-01-01 | End: 2021-01-21

## 2021-01-12 DIAGNOSIS — E10.65 TYPE 1 DIABETES MELLITUS WITH HYPERGLYCEMIA (HCC): Primary | ICD-10-CM

## 2021-01-15 ENCOUNTER — OFFICE VISIT (OUTPATIENT)
Dept: FAMILY MEDICINE CLINIC | Facility: CLINIC | Age: 41
End: 2021-01-15
Payer: COMMERCIAL

## 2021-01-15 VITALS
HEIGHT: 72 IN | DIASTOLIC BLOOD PRESSURE: 74 MMHG | TEMPERATURE: 98 F | RESPIRATION RATE: 16 BRPM | BODY MASS INDEX: 25.73 KG/M2 | SYSTOLIC BLOOD PRESSURE: 110 MMHG | HEART RATE: 72 BPM | WEIGHT: 190 LBS

## 2021-01-15 DIAGNOSIS — Z00.00 ROUTINE ADULT HEALTH MAINTENANCE: Primary | ICD-10-CM

## 2021-01-15 PROCEDURE — 3008F BODY MASS INDEX DOCD: CPT | Performed by: FAMILY MEDICINE

## 2021-01-15 PROCEDURE — 99395 PREV VISIT EST AGE 18-39: CPT | Performed by: FAMILY MEDICINE

## 2021-01-15 PROCEDURE — 3074F SYST BP LT 130 MM HG: CPT | Performed by: FAMILY MEDICINE

## 2021-01-15 PROCEDURE — 3078F DIAST BP <80 MM HG: CPT | Performed by: FAMILY MEDICINE

## 2021-01-15 NOTE — PROGRESS NOTES
Anthony Hu is a 47year old male who presents for a complete physical exam.   HPI:   Pt complains of nothing.   Urination changes no  ED symptoms no  Immunizations needed no  Wt Readings from Last 6 Encounters:  01/15/21 : 190 lb (86.2 kg)  07/30/20 : 15 x daily 1 Box 2   • ATORVASTATIN 20 MG Oral Tab TAKE 1 TABLET BY MOUTH EVERY DAY AT NIGHT 90 tablet 0      Past Medical History:   Diagnosis Date   • Diabetes ([facility] [address]    • High cholesterol    • Hypothyroidism       Past Surgical History:   Procedure Charmyke Sake glands/polyuria/polydypsia  ALL/ASTHMA: denies allergic rhinitis/asthma    EXAM:   /74   Pulse 72   Temp 98.1 °F (36.7 °C) (Oral)   Resp 16   Ht 6' (1.829 m)   Wt 190 lb (86.2 kg)   BMI 25.77 kg/m²   Body mass index is 25.77 kg/m².    GENERAL: NAD, pl days a week for cardiovascular fitness and 45-60 minutes 6-7 days a week for weight loss. Advised testicular self exam once a month. Above age 28-36, patient was told to have a heart scan done for coronary artery disease screening every 3-5 years.  Infor

## 2021-01-16 LAB
ALBUMIN/GLOBULIN RATIO: 1.7 (CALC) (ref 1–2.5)
ALBUMIN: 4.3 G/DL (ref 3.6–5.1)
ALKALINE PHOSPHATASE: 101 U/L (ref 36–130)
ALT: 17 U/L (ref 9–46)
AST: 14 U/L (ref 10–40)
BILIRUBIN, TOTAL: 0.7 MG/DL (ref 0.2–1.2)
BUN: 13 MG/DL (ref 7–25)
CALCIUM: 9.9 MG/DL (ref 8.6–10.3)
CARBON DIOXIDE: 31 MMOL/L (ref 20–32)
CHLORIDE: 103 MMOL/L (ref 98–110)
CHOL/HDLC RATIO: 3.3 (CALC)
CHOLESTEROL, TOTAL: 154 MG/DL
CREATININE, RANDOM URINE: 238 MG/DL (ref 20–320)
CREATININE: 0.85 MG/DL (ref 0.6–1.35)
EGFR IF AFRICN AM: 128 ML/MIN/1.73M2
EGFR IF NONAFRICN AM: 111 ML/MIN/1.73M2
GLOBULIN: 2.6 G/DL (CALC) (ref 1.9–3.7)
GLUCOSE: 127 MG/DL (ref 65–99)
HDL CHOLESTEROL: 46 MG/DL
HEMOGLOBIN A1C: 8.7 % OF TOTAL HGB
LDL-CHOLESTEROL: 93 MG/DL (CALC)
MICROALBUMIN/CREATININE RATIO, RANDOM URINE: 3 MCG/MG CREAT
MICROALBUMIN: 0.8 MG/DL
NON-HDL CHOLESTEROL: 108 MG/DL (CALC)
POTASSIUM: 4.5 MMOL/L (ref 3.5–5.3)
PROTEIN, TOTAL: 6.9 G/DL (ref 6.1–8.1)
SODIUM: 141 MMOL/L (ref 135–146)
TRIGLYCERIDES: 67 MG/DL
TSH W/REFLEX TO FT4: 1.91 MIU/L (ref 0.4–4.5)

## 2021-01-19 DIAGNOSIS — E11.65 TYPE 2 DIABETES MELLITUS WITH HYPERGLYCEMIA (HCC): ICD-10-CM

## 2021-01-19 NOTE — TELEPHONE ENCOUNTER
Diabetic Medication Protocol Failed01/19/2021 12:28 AM   Last HgBA1C < 7.5 Protocol Details    HgBA1C procedure resulted in past 6 months     Microalbumin procedure in past 12 months or taking ACE/ARB     Appointment in past 6 or next 3 months      Refill

## 2021-01-20 NOTE — PROGRESS NOTES
Kenna Grady is a 47year old male who presents today for type 1 diabetes management. Primary care physician: Salvador Schultz MD     Due to COVID-19 ACTION PLAN, the patient's office visit was converted to a video visit.  Please note that the following v EDDIE antibody)     Patient has not had hospitalizations for blood sugar issues and denies any history of pancreatitis        Previous DM therapies:  Ozempic: T1DM dx 6-2020   Metformin: 6-2020     Current DM Regimen:    Tresiba 12 units once daily   Novolog Laterality Date   • CYSTOSCOPY URETEROSCOPY Right 9/11/2017    Performed by Scooter Quigley MD at [facility] MAIN OR   • ESWL LITHOTRIPSY WITH CYSTOSCOPY, STENT PLACEMENT Left 10/3/2017    Performed by Scooter Quigley MD at [address] [place] [address] sensor 1 each 3   • Accu-Chek FastClix Lancets Does not apply Misc Test 3 x daily 1 Box 2       DM associated review of  symptoms:   Endocrine: Polyuria, polyphagia, polydipsia: no  Neurological: Paresthesias: no  HEENT: Blurred vision: no  Skin: no rash o treatment of hypoglycemia (on AVS)   Continue with lifestyle modifications since they have positive impact on diabetes/blood sugars/health (portion control, physical activity, weight loss)      The patient is asked to return in 3-4 m but recommended to con on-going public health crisis/national emergency and because of restrictions of visitation. There are limitations of this visit as no or only very limited physical exam could be performed.   Every conscious effort was taken to allow for sufficient and adeq

## 2021-01-21 ENCOUNTER — TELEMEDICINE (OUTPATIENT)
Dept: ENDOCRINOLOGY CLINIC | Facility: CLINIC | Age: 41
End: 2021-01-21

## 2021-01-21 DIAGNOSIS — E10.65 TYPE 1 DIABETES MELLITUS WITH HYPERGLYCEMIA (HCC): Primary | ICD-10-CM

## 2021-01-21 DIAGNOSIS — E78.5 DYSLIPIDEMIA: ICD-10-CM

## 2021-01-21 PROCEDURE — 99215 OFFICE O/P EST HI 40 MIN: CPT | Performed by: NURSE PRACTITIONER

## 2021-01-21 RX ORDER — INSULIN ASPART 100 [IU]/ML
INJECTION, SOLUTION INTRAVENOUS; SUBCUTANEOUS
Qty: 45 ML | Refills: 1 | Status: SHIPPED | OUTPATIENT
Start: 2021-01-21 | End: 2021-10-26

## 2021-01-21 RX ORDER — GLUCAGON INJECTION, SOLUTION 1 MG/.2ML
1 INJECTION, SOLUTION SUBCUTANEOUS AS NEEDED
Qty: 0.4 ML | Refills: 1 | Status: SHIPPED | OUTPATIENT
Start: 2021-01-21

## 2021-01-21 NOTE — PATIENT INSTRUCTIONS
Your A1C: 8.7%   This is  high for you and we will work together on lowering your blood sugars to help improve your health    The A1C test provides us with your average blood sugar for the past 3 months.  Keeping an A1C less than 7% helps reduce or delay he skin.  If you are someone who is at risk for hypoglycemia, you should have a current (nonexpired) glucagon emergency kit on hand AND someone who knows where it is kept and how to use it. If you need it, you won’t be able to give it to yourself.     When is sweating  · Feeling hungry  · Headache  · Nervousness  · A hard, fast heartbeat  · Weakness  · Confusion or irritability  · Blurred eyesight  · Having nightmares or waking up confused or sweating  · Numbness or tingling in the lips or tongue    Treatment o in stock (e.g. Backordered), it is your responsibility to find another pharmacy that has the requested medication available.   We will gladly send a new prescription to that pharmacy at your request.     Thank you   Patrick Epstein   [phone]

## 2021-02-11 RX ORDER — ATORVASTATIN CALCIUM 20 MG/1
TABLET, FILM COATED ORAL
Qty: 90 TABLET | Refills: 1 | Status: SHIPPED | OUTPATIENT
Start: 2021-02-11 | End: 2021-08-30

## 2021-02-11 NOTE — TELEPHONE ENCOUNTER
Requested Prescriptions     Name from pharmacy: ATORVASTATIN 20 MG TABLET         Will file in chart as: ATORVASTATIN 20 MG Oral Tab    Sig: TAKE 1 TABLET BY MOUTH EVERY DAY AT NIGHT    Disp:  90 tablet    Refills:  0 (Pharmacy requested: Not specified)

## 2021-03-04 RX ORDER — INSULIN DEGLUDEC INJECTION 100 U/ML
INJECTION, SOLUTION SUBCUTANEOUS
Qty: 15 ML | Refills: 1 | Status: SHIPPED | OUTPATIENT
Start: 2021-03-04 | End: 2021-09-15

## 2021-03-04 NOTE — TELEPHONE ENCOUNTER
Orders Placed This Encounter      Insulin Degludec (TRESIBA FLEXTOUCH) 100 UNIT/ML Subcutaneous Solution Pen-injector          Sig: Uses 15 units daily as directed          Dispense:  15 mL          Refill:  1

## 2021-03-19 ENCOUNTER — TELEPHONE (OUTPATIENT)
Dept: ENDOCRINOLOGY CLINIC | Facility: CLINIC | Age: 41
End: 2021-03-19

## 2021-05-04 DIAGNOSIS — E10.65 TYPE 1 DIABETES MELLITUS WITH HYPERGLYCEMIA (HCC): Primary | ICD-10-CM

## 2021-05-05 NOTE — PROGRESS NOTES
Kenna Grady is a 47year old male who presents today for type 1 diabetes management. Primary care physician: Salvador Schultz MD     Due to COVID-19 ACTION PLAN, the patient's office visit was converted to a video visit.  Please note that the following v Value Date    CHOLEST 154 01/15/2021    TRIG 67 01/15/2021    HDL 46 01/15/2021    LDL 93 01/15/2021    MICROALBCREA  01/14/2019      Comment:        Unable to calculate due to Urine Microalbumin <0.5 mg/dL      CREATSERUM 0.85 01/15/2021    GFRNAA 111 01/ DAY AT NIGHT 90 tablet 1   • Insulin Aspart Pen (NOVOLOG FLEXPEN) 100 UNIT/ML Subcutaneous Solution Pen-injector Using up to 50 units daily as directed in divided doses at meals 45 mL 1   • Glucagon (GVOKE HYPOPEN 2-PACK) 1 MG/0.2ML Subcutaneous Solution A glycemic control for long term health benefits.    Not interested in In pen or Insulin pump     Increase Tresiba U 100 flex touch : 14 units  Daily ---> 15 units daily   Continue:   Dexcom CGM G6  Metformin 500mg once daily    Novolo-12 units before yvette

## 2021-05-06 ENCOUNTER — TELEMEDICINE (OUTPATIENT)
Dept: ENDOCRINOLOGY CLINIC | Facility: CLINIC | Age: 41
End: 2021-05-06

## 2021-05-06 VITALS — BODY MASS INDEX: 26 KG/M2 | WEIGHT: 195 LBS

## 2021-05-06 PROCEDURE — 95251 CONT GLUC MNTR ANALYSIS I&R: CPT | Performed by: NURSE PRACTITIONER

## 2021-05-06 PROCEDURE — 99213 OFFICE O/P EST LOW 20 MIN: CPT | Performed by: NURSE PRACTITIONER

## 2021-05-06 NOTE — PATIENT INSTRUCTIONS
Your A1C: 7.9% (down from 8.7%)   This is better for you; but we will continue to work together on lowering your blood sugars to help improve your health    The A1C test provides us with your average blood sugar for the past 3 months.  Keeping an A1C less t eyesight  · Having nightmares or waking up confused or sweating  · Numbness or tingling in the lips or tongue    Treatment of Low Blood sugar Action Plan  1. Check blood glucose to be sure that it is low. You can’t always go by symptoms.  If in doubt, treat gladly send a new prescription to that pharmacy at your request.     Thank you   Natasha Steven   [phone]

## 2021-06-15 ENCOUNTER — TELEPHONE (OUTPATIENT)
Dept: ENDOCRINOLOGY CLINIC | Facility: CLINIC | Age: 41
End: 2021-06-15

## 2021-06-15 NOTE — TELEPHONE ENCOUNTER
Pt called in bc his tresiba was $100 more for this refill than the last 90 script he filled. He tried the tresiba savings card from a link given by his insurance. No plan change. Provided text [id number] SAVE to try and activate card, see if that helps.   He

## 2021-06-24 ENCOUNTER — TELEPHONE (OUTPATIENT)
Dept: ENDOCRINOLOGY CLINIC | Facility: CLINIC | Age: 41
End: 2021-06-24

## 2021-06-24 NOTE — TELEPHONE ENCOUNTER
Received fax from [facility] for CGM transmitter and     Reviewed and signed by CAB    Fax: [phone]  Fax confirmed    Copy sent to scan  Form placed in folder

## 2021-08-28 DIAGNOSIS — E11.65 TYPE 2 DIABETES MELLITUS WITH HYPERGLYCEMIA (HCC): ICD-10-CM

## 2021-08-30 NOTE — TELEPHONE ENCOUNTER
Requested Prescriptions     Name from pharmacy: ATORVASTATIN 20 MG TABLET         Will file in chart as: ATORVASTATIN 20 MG Oral Tab    Sig: TAKE 1 TABLET BY MOUTH EVERY DAY AT NIGHT    Disp:  90 tablet    Refills:  1    Start: 8/28/2021    Class: Normal

## 2021-08-31 RX ORDER — ATORVASTATIN CALCIUM 20 MG/1
TABLET, FILM COATED ORAL
Qty: 90 TABLET | Refills: 0 | Status: SHIPPED | OUTPATIENT
Start: 2021-08-31

## 2021-09-15 RX ORDER — INSULIN DEGLUDEC INJECTION 100 U/ML
INJECTION, SOLUTION SUBCUTANEOUS
Qty: 15 ML | Refills: 0 | Status: SHIPPED | OUTPATIENT
Start: 2021-09-15 | End: 2021-12-23

## 2021-09-15 NOTE — TELEPHONE ENCOUNTER
Requested Prescriptions     Pending Prescriptions Disp Refills   • Insulin Degludec (TRESIBA FLEXTOUCH) 100 UNIT/ML Subcutaneous Solution Pen-injector [Pharmacy Med Name: Tia Eugene 100 UNIT/ML]  1     Sig: INJECT 15 UNITS DAILY AS DIRECTED     FILL

## 2021-10-21 ENCOUNTER — HOSPITAL ENCOUNTER (OUTPATIENT)
Facility: HOSPITAL | Age: 41
Setting detail: OBSERVATION
Discharge: HOME | End: 2021-10-22
Attending: EMERGENCY MEDICINE | Admitting: INTERNAL MEDICINE
Payer: COMMERCIAL

## 2021-10-21 ENCOUNTER — APPOINTMENT (OUTPATIENT)
Dept: CT IMAGING | Age: 41
End: 2021-10-21
Attending: EMERGENCY MEDICINE
Payer: COMMERCIAL

## 2021-10-21 DIAGNOSIS — N20.0 KIDNEY STONE: Primary | ICD-10-CM

## 2021-10-21 DIAGNOSIS — N20.1 LEFT URETERAL STONE: ICD-10-CM

## 2021-10-21 PROCEDURE — 74176 CT ABD & PELVIS W/O CONTRAST: CPT | Performed by: EMERGENCY MEDICINE

## 2021-10-21 RX ORDER — TAMSULOSIN HYDROCHLORIDE 0.4 MG/1
0.4 CAPSULE ORAL DAILY
Qty: 7 CAPSULE | Refills: 0 | Status: SHIPPED | OUTPATIENT
Start: 2021-10-21 | End: 2021-10-28

## 2021-10-21 RX ORDER — HYDROMORPHONE HYDROCHLORIDE 1 MG/ML
0.5 INJECTION, SOLUTION INTRAMUSCULAR; INTRAVENOUS; SUBCUTANEOUS ONCE
Status: COMPLETED | OUTPATIENT
Start: 2021-10-21 | End: 2021-10-21

## 2021-10-21 RX ORDER — SODIUM CHLORIDE 9 MG/ML
INJECTION, SOLUTION INTRAVENOUS ONCE
Status: COMPLETED | OUTPATIENT
Start: 2021-10-21 | End: 2021-10-21

## 2021-10-21 RX ORDER — ONDANSETRON 2 MG/ML
4 INJECTION INTRAMUSCULAR; INTRAVENOUS ONCE
Status: COMPLETED | OUTPATIENT
Start: 2021-10-21 | End: 2021-10-21

## 2021-10-21 RX ORDER — HYDROMORPHONE HYDROCHLORIDE 1 MG/ML
0.5 INJECTION, SOLUTION INTRAMUSCULAR; INTRAVENOUS; SUBCUTANEOUS EVERY 30 MIN PRN
Status: DISCONTINUED | OUTPATIENT
Start: 2021-10-21 | End: 2021-10-22

## 2021-10-21 RX ORDER — HYDROCODONE BITARTRATE AND ACETAMINOPHEN 5; 325 MG/1; MG/1
1-2 TABLET ORAL EVERY 6 HOURS PRN
Qty: 10 TABLET | Refills: 0 | Status: SHIPPED | OUTPATIENT
Start: 2021-10-21 | End: 2021-10-28

## 2021-10-21 RX ORDER — ONDANSETRON 4 MG/1
4 TABLET, ORALLY DISINTEGRATING ORAL EVERY 4 HOURS PRN
Qty: 10 TABLET | Refills: 0 | Status: SHIPPED | OUTPATIENT
Start: 2021-10-21 | End: 2021-10-28

## 2021-10-21 RX ORDER — KETOROLAC TROMETHAMINE 30 MG/ML
30 INJECTION, SOLUTION INTRAMUSCULAR; INTRAVENOUS ONCE
Status: COMPLETED | OUTPATIENT
Start: 2021-10-21 | End: 2021-10-21

## 2021-10-22 ENCOUNTER — ANESTHESIA EVENT (OUTPATIENT)
Dept: SURGERY | Facility: HOSPITAL | Age: 41
End: 2021-10-22
Payer: COMMERCIAL

## 2021-10-22 ENCOUNTER — ANESTHESIA (OUTPATIENT)
Dept: SURGERY | Facility: HOSPITAL | Age: 41
End: 2021-10-22
Payer: COMMERCIAL

## 2021-10-22 ENCOUNTER — APPOINTMENT (OUTPATIENT)
Dept: GENERAL RADIOLOGY | Facility: HOSPITAL | Age: 41
End: 2021-10-22
Attending: UROLOGY
Payer: COMMERCIAL

## 2021-10-22 ENCOUNTER — TELEPHONE (OUTPATIENT)
Dept: SURGERY | Facility: CLINIC | Age: 41
End: 2021-10-22

## 2021-10-22 VITALS
SYSTOLIC BLOOD PRESSURE: 129 MMHG | HEART RATE: 65 BPM | TEMPERATURE: 98 F | BODY MASS INDEX: 26 KG/M2 | WEIGHT: 195.13 LBS | OXYGEN SATURATION: 97 % | DIASTOLIC BLOOD PRESSURE: 72 MMHG | RESPIRATION RATE: 16 BRPM

## 2021-10-22 PROCEDURE — 0T778DZ DILATION OF LEFT URETER WITH INTRALUMINAL DEVICE, VIA NATURAL OR ARTIFICIAL OPENING ENDOSCOPIC: ICD-10-PCS | Performed by: UROLOGY

## 2021-10-22 PROCEDURE — 0TC78ZZ EXTIRPATION OF MATTER FROM LEFT URETER, VIA NATURAL OR ARTIFICIAL OPENING ENDOSCOPIC: ICD-10-PCS | Performed by: UROLOGY

## 2021-10-22 PROCEDURE — 99234 HOSP IP/OBS SM DT SF/LOW 45: CPT | Performed by: HOSPITALIST

## 2021-10-22 DEVICE — URETERAL STENT
Type: IMPLANTABLE DEVICE | Site: URETER | Status: FUNCTIONAL
Brand: ASCERTA™

## 2021-10-22 RX ORDER — CIPROFLOXACIN 500 MG/1
500 TABLET, FILM COATED ORAL 2 TIMES DAILY
Qty: 14 TABLET | Refills: 0 | Status: SHIPPED | OUTPATIENT
Start: 2021-10-22 | End: 2021-10-29

## 2021-10-22 RX ORDER — HYDROMORPHONE HYDROCHLORIDE 1 MG/ML
0.2 INJECTION, SOLUTION INTRAMUSCULAR; INTRAVENOUS; SUBCUTANEOUS EVERY 2 HOUR PRN
Status: DISCONTINUED | OUTPATIENT
Start: 2021-10-22 | End: 2021-10-22

## 2021-10-22 RX ORDER — PROCHLORPERAZINE EDISYLATE 5 MG/ML
5 INJECTION INTRAMUSCULAR; INTRAVENOUS EVERY 8 HOURS PRN
Status: DISCONTINUED | OUTPATIENT
Start: 2021-10-22 | End: 2021-10-22

## 2021-10-22 RX ORDER — SODIUM PHOSPHATE, DIBASIC AND SODIUM PHOSPHATE, MONOBASIC 7; 19 G/133ML; G/133ML
1 ENEMA RECTAL ONCE AS NEEDED
Status: DISCONTINUED | OUTPATIENT
Start: 2021-10-22 | End: 2021-10-22

## 2021-10-22 RX ORDER — LIDOCAINE HYDROCHLORIDE 10 MG/ML
INJECTION, SOLUTION EPIDURAL; INFILTRATION; INTRACAUDAL; PERINEURAL AS NEEDED
Status: DISCONTINUED | OUTPATIENT
Start: 2021-10-22 | End: 2021-10-22 | Stop reason: SURG

## 2021-10-22 RX ORDER — HYDROMORPHONE HYDROCHLORIDE 1 MG/ML
0.8 INJECTION, SOLUTION INTRAMUSCULAR; INTRAVENOUS; SUBCUTANEOUS EVERY 2 HOUR PRN
Status: DISCONTINUED | OUTPATIENT
Start: 2021-10-22 | End: 2021-10-22

## 2021-10-22 RX ORDER — DOCUSATE SODIUM 100 MG/1
100 CAPSULE, LIQUID FILLED ORAL 2 TIMES DAILY
Status: DISCONTINUED | OUTPATIENT
Start: 2021-10-22 | End: 2021-10-22

## 2021-10-22 RX ORDER — LEVOFLOXACIN 5 MG/ML
750 INJECTION, SOLUTION INTRAVENOUS EVERY 24 HOURS
Status: DISCONTINUED | OUTPATIENT
Start: 2021-10-22 | End: 2021-10-22

## 2021-10-22 RX ORDER — SODIUM CHLORIDE 9 MG/ML
INJECTION, SOLUTION INTRAVENOUS ONCE
Status: COMPLETED | OUTPATIENT
Start: 2021-10-22 | End: 2021-10-22

## 2021-10-22 RX ORDER — KETOROLAC TROMETHAMINE 30 MG/ML
INJECTION, SOLUTION INTRAMUSCULAR; INTRAVENOUS AS NEEDED
Status: DISCONTINUED | OUTPATIENT
Start: 2021-10-22 | End: 2021-10-22 | Stop reason: SURG

## 2021-10-22 RX ORDER — LEVOFLOXACIN 5 MG/ML
500 INJECTION, SOLUTION INTRAVENOUS EVERY 24 HOURS
Status: DISCONTINUED | OUTPATIENT
Start: 2021-10-22 | End: 2021-10-22

## 2021-10-22 RX ORDER — LIDOCAINE HYDROCHLORIDE 20 MG/ML
JELLY TOPICAL AS NEEDED
Status: DISCONTINUED | OUTPATIENT
Start: 2021-10-22 | End: 2021-10-22 | Stop reason: HOSPADM

## 2021-10-22 RX ORDER — HYDROMORPHONE HYDROCHLORIDE 1 MG/ML
0.4 INJECTION, SOLUTION INTRAMUSCULAR; INTRAVENOUS; SUBCUTANEOUS EVERY 2 HOUR PRN
Status: DISCONTINUED | OUTPATIENT
Start: 2021-10-22 | End: 2021-10-22

## 2021-10-22 RX ORDER — ATORVASTATIN CALCIUM 20 MG/1
20 TABLET, FILM COATED ORAL NIGHTLY
Status: DISCONTINUED | OUTPATIENT
Start: 2021-10-22 | End: 2021-10-22

## 2021-10-22 RX ORDER — DEXAMETHASONE SODIUM PHOSPHATE 4 MG/ML
VIAL (ML) INJECTION AS NEEDED
Status: DISCONTINUED | OUTPATIENT
Start: 2021-10-22 | End: 2021-10-22 | Stop reason: SURG

## 2021-10-22 RX ORDER — ONDANSETRON 2 MG/ML
4 INJECTION INTRAMUSCULAR; INTRAVENOUS EVERY 6 HOURS PRN
Status: DISCONTINUED | OUTPATIENT
Start: 2021-10-22 | End: 2021-10-22

## 2021-10-22 RX ORDER — POLYETHYLENE GLYCOL 3350 17 G/17G
17 POWDER, FOR SOLUTION ORAL DAILY PRN
Status: DISCONTINUED | OUTPATIENT
Start: 2021-10-22 | End: 2021-10-22

## 2021-10-22 RX ORDER — DOCUSATE SODIUM 100 MG/1
100 CAPSULE, LIQUID FILLED ORAL 2 TIMES DAILY PRN
Qty: 30 CAPSULE | Refills: 0 | Status: SHIPPED | OUTPATIENT
Start: 2021-10-22

## 2021-10-22 RX ORDER — BISACODYL 10 MG
10 SUPPOSITORY, RECTAL RECTAL
Status: DISCONTINUED | OUTPATIENT
Start: 2021-10-22 | End: 2021-10-22

## 2021-10-22 RX ORDER — ONDANSETRON 2 MG/ML
INJECTION INTRAMUSCULAR; INTRAVENOUS AS NEEDED
Status: DISCONTINUED | OUTPATIENT
Start: 2021-10-22 | End: 2021-10-22 | Stop reason: SURG

## 2021-10-22 RX ORDER — DEXTROSE MONOHYDRATE 25 G/50ML
50 INJECTION, SOLUTION INTRAVENOUS
Status: DISCONTINUED | OUTPATIENT
Start: 2021-10-22 | End: 2021-10-22

## 2021-10-22 RX ORDER — CEFAZOLIN SODIUM/WATER 2 G/20 ML
SYRINGE (ML) INTRAVENOUS AS NEEDED
Status: DISCONTINUED | OUTPATIENT
Start: 2021-10-22 | End: 2021-10-22 | Stop reason: SURG

## 2021-10-22 RX ORDER — HYDROCODONE BITARTRATE AND ACETAMINOPHEN 5; 325 MG/1; MG/1
1 TABLET ORAL EVERY 6 HOURS PRN
Qty: 30 TABLET | Refills: 0 | Status: SHIPPED | OUTPATIENT
Start: 2021-10-22

## 2021-10-22 RX ORDER — SODIUM CHLORIDE 9 MG/ML
INJECTION, SOLUTION INTRAVENOUS CONTINUOUS PRN
Status: DISCONTINUED | OUTPATIENT
Start: 2021-10-22 | End: 2021-10-22 | Stop reason: SURG

## 2021-10-22 RX ORDER — ACETAMINOPHEN 325 MG/1
650 TABLET ORAL EVERY 6 HOURS PRN
Status: DISCONTINUED | OUTPATIENT
Start: 2021-10-22 | End: 2021-10-22

## 2021-10-22 RX ORDER — ENOXAPARIN SODIUM 100 MG/ML
40 INJECTION SUBCUTANEOUS DAILY
Status: DISCONTINUED | OUTPATIENT
Start: 2021-10-22 | End: 2021-10-22

## 2021-10-22 RX ADMIN — DEXAMETHASONE SODIUM PHOSPHATE 4 MG: 4 MG/ML VIAL (ML) INJECTION at 07:00:00

## 2021-10-22 RX ADMIN — KETOROLAC TROMETHAMINE 30 MG: 30 INJECTION, SOLUTION INTRAMUSCULAR; INTRAVENOUS at 07:19:00

## 2021-10-22 RX ADMIN — SODIUM CHLORIDE: 9 INJECTION, SOLUTION INTRAVENOUS at 07:16:00

## 2021-10-22 RX ADMIN — SODIUM CHLORIDE: 9 INJECTION, SOLUTION INTRAVENOUS at 06:37:00

## 2021-10-22 RX ADMIN — LIDOCAINE HYDROCHLORIDE 50 MG: 10 INJECTION, SOLUTION EPIDURAL; INFILTRATION; INTRACAUDAL; PERINEURAL at 06:43:00

## 2021-10-22 RX ADMIN — ONDANSETRON 4 MG: 2 INJECTION INTRAMUSCULAR; INTRAVENOUS at 07:18:00

## 2021-10-22 RX ADMIN — CEFAZOLIN SODIUM/WATER 2 G: 2 G/20 ML SYRINGE (ML) INTRAVENOUS at 06:55:00

## 2021-10-22 NOTE — ED PROVIDER NOTES
Patient here with obstructive uropathy, signout from prior team to check urinalysis. Patient is a difficulty providing urine sample, after 2 L of IV fluid, feels unable to urinate, bedside ultrasound shows 120 cc in the bladder.   Required multiple additio mass or adenopathy. BOWEL/MESENTERY:  There is diverticulosis of the colon without CT evidence of diverticulitis. ABDOMINAL WALL:  No mass or hernia. BONES:  No bony lesion or fracture. PELVIC ORGANS:  Normal for age.   LUNG BASES:  No visible pulmonary o

## 2021-10-22 NOTE — PLAN OF CARE
Patient A&O X4 on RA. VSS, . SCDs, lovenox. Patient needed to be straight cathed at the  ED- DTV by 0830, LBM 10/21. Admitted for kidney stones- urology consult in  ED. C/o pain to left flank- IV pain medication given with some relief.  Reminded to u

## 2021-10-22 NOTE — OPERATIVE REPORT
Urology Operative Note    Attending Surgeon: David Obrien MD    Patient Name: Sandra Reyes    Date of Surgery: 10/22/2021    Preoperative Diagnosis: left sided kidney and ureteral stones    Postoperative Diagnosis: same    Procedure Performed: Cystoscopy of stent in the bladder using direct cystoscopic examination. The bladder was drained and the scope was removed. The stone fragments were sent for analysis. The patient was awoken having tolerated the procedure well.     Specimens: Stone fragments    C

## 2021-10-22 NOTE — H&P
[facility] HOSPITALIST  History and Physical     Nikokailee Lopez Patient Status:  Observation    1980 MRN [id number]   [facility] 3SW-A Attending Samina Worthy, DO   Hosp Day # 0 PCP Sherwin Nevarez MD     Chief Complaint: Left flank pain 1  Glucagon (GVOKE HYPOPEN 2-PACK) 1 MG/0.2ML Subcutaneous Solution Auto-injector, Inject 1 mg into the skin as needed. , Disp: 0.4 mL, Rfl: 1  Glucose Blood (ACCU-CHEK GUIDE) In Vitro Strip, , Disp: , Rfl:   BD PEN NEEDLE MAURICE 2ND GEN 32G X 4 MM Does not a Labs   Lab 10/21/21  2018   *   BUN 12   CREATSERUM 1.01   GFRAA 108   GFRNAA 93   CA 9.4   ALB 4.1      K 4.6      CO2 30.0   ALKPHO 116   AST 22   ALT 32   BILT 0.5   TP 7.9       Estimated Creatinine Clearance: 107.8 mL/min (based on

## 2021-10-22 NOTE — ED PROVIDER NOTES
Patient Seen in: [facility] Emergency Department In [place]      History   Patient presents with:  Abdomen/Flank Pain: Pt has a hx of kidney stones.  Pt started with LEFT side flank pain    Stated Complaint: LEFT flank pain, hx of kidney stones    Subjecti to auscultation bilaterally with no wheeze. There is good equal air entry bilaterally. HEART: Regular rate and rhythm. Normal S1, S2 no S3, or S4. No murmur. ABDOMEN: There is no focal tenderness to palpation appreciated anywhere throughout the abdomen. Dictated by (CST): Oren Main MD on 10/21/2021 at 8:47 PM     Finalized by (CST): Oren Main MD on 10/21/2021 at 8:50 PM              MDM          Patient had IV line established and blood work drawn including a CBC, chemistries, BUN/creatinine, and

## 2021-10-22 NOTE — CONSULTS
[facility]    Urology Consult Note    Windy Sharif Patient Status:  Observation    1980 MRN [id number]   [facility] 3SW-A Attending Venecia Bryson, DO   Hosp Day # 0 PCP Elvia Monterroso MD     Date of Admission:  10/21/2021  Date o pulse 71, temperature 97.9 °F (36.6 °C), temperature source Oral, resp. rate 16, weight 195 lb 1.7 oz (88.5 kg), SpO2 97 %.      GENERAL APPEARANCE: well developed, well nourished, in no acute distress  NEUROLOGIC: no localizing neurologic signs, alert and patient understands and would like to proceed.        Naresh Darby MD, FACS  Urologist  [facility]    Date: 10/22/2021  Time: 6:07 AM

## 2021-10-22 NOTE — PROGRESS NOTES
NURSING ADMISSION NOTE      Patient admitted via Wheelchair from  ED  Oriented to room. Safety precautions initiated. Bed in low position. Call light in reach. Admission navigator completed at bedside. MD paged for orders. Updated on POC.

## 2021-10-22 NOTE — ANESTHESIA POSTPROCEDURE EVALUATION
[address] Patient Status:  Observation   Age/Gender 46year old male MRN [id number]   [facility] SURGERY Attending [address] [place] [address] 955, 10 [facility] Day # 0 PCP Carlo Reyes MD       Anesthesia Post-op Note    CYSTOSCOPY, LEFT

## 2021-10-22 NOTE — PROGRESS NOTES
[facility] Pharmacy Note:  Renal Adjustment for levofloxacin (David Frederick)    Terencearmen Clarke is a 46year old patient who has been prescribed levofloxacin (LEVAQUIN) 500 mg every 24 hrs. The estimated creatinine clearance is 99.9 mL/min (based on SCr of 1.09 mg/dL).

## 2021-10-22 NOTE — TELEPHONE ENCOUNTER
Left VM asking if the pt would like to schedule his cysto with stent removal on 10/19/2021 at 9:45am.

## 2021-10-22 NOTE — ANESTHESIA PREPROCEDURE EVALUATION
PRE-OP EVALUATION    Patient Name: Jane Akhtar    Admit Diagnosis: Kidney stone [N20.0]    Pre-op Diagnosis: Left ureteral stone [N20.1]    CYSTOSCOPY, LEFT URETEROSCOPY, POSSIBLE LASER LITHOTRIPSY, STONE EXTRACTION, LEFT STENT INSERTION    Anesthesia Pr enema 133 mL, 1 enema, Rectal, Once PRN  [MAR Hold] HYDROmorphone HCl (DILAUDID) 1 MG/ML injection 0.2 mg, 0.2 mg, Intravenous, Q2H PRN   Or  [MAR Hold] HYDROmorphone HCl (DILAUDID) 1 MG/ML injection 0.4 mg, 0.4 mg, Intravenous, Q2H PRN   Or  [MAR Hold] HY w/Device Does not apply Kit, TEST ONCE DAILY AS DIRECTED, Disp: , Rfl:   Continuous Blood Gluc Sensor (DEXCOM G6 SENSOR) Does not apply Misc, 1 Device by Does not apply route Every 10 days. , Disp: 3 each, Rfl: 12  Continuous Blood Gluc Transmit (DEXCOM G6 10/22/2021    CO2 26.0 10/22/2021    BUN 15 10/22/2021    CREATSERUM 1.09 10/22/2021     (H) 10/22/2021    CA 8.1 (L) 10/22/2021            Airway      Mallampati: II  Mouth opening: 3 FB  TM distance: 4 - 6 cm  Neck ROM: full Cardiovascular      Rh

## 2021-10-22 NOTE — PLAN OF CARE
Pt returned from surgery at 0900. Voiding without difficulty. Urine red tinged. C/o burning with urination. OK to dc per urology. Seen by Dr. Phani Graf. Dc paperwork completed. Written and verbal dc instructions given to pt.   Verbalized understandi

## 2021-10-22 NOTE — TELEPHONE ENCOUNTER
I called the pt and offered him appt on 10/29/2021 at 9:45 he said he will be unavailable from 9-2 on 10/29. I offered an appt at 8:30 am and he agreed.

## 2021-10-23 NOTE — PROGRESS NOTES
HPI:     Marlee Buckley is a 46year old male with a PMH of DM1, HL, hypothyroid    Following for:  1.  Recurrent kidney stones  - s/p left URS 10/22/21  - passed 3-4 stones, one ESWL 2017 [facility])    Presents for stent removal and to discuss stone prevent High cholesterol    • Hypothyroidism    • Type 1 diabetes mellitus [facility])       Past Surgical History:   Procedure Laterality Date   • OTHER SURGICAL HISTORY  09/14/2017    eswl Dr Pauline Wall    • OTHER SURGICAL HISTORY  09/18/2017    cysto, stent removal dr Mira Galan by Does not apply route Every 10 days.  3 each 12   • Continuous Blood Gluc Transmit (DEXCOM G6 TRANSMITTER) Does not apply Misc Use as directed with Dexcom G 6 sensor 1 each 3   • Accu-Chek FastClix Lancets Does not apply Misc Test 3 x daily 1 Box 2   • ci

## 2021-10-25 ENCOUNTER — PATIENT OUTREACH (OUTPATIENT)
Dept: CASE MANAGEMENT | Age: 41
End: 2021-10-25

## 2021-10-25 ENCOUNTER — TELEPHONE (OUTPATIENT)
Dept: FAMILY MEDICINE CLINIC | Facility: CLINIC | Age: 41
End: 2021-10-25

## 2021-10-25 DIAGNOSIS — Z02.9 ENCOUNTERS FOR ADMINISTRATIVE PURPOSE: ICD-10-CM

## 2021-10-25 NOTE — TELEPHONE ENCOUNTER
S/w patient  All questions answered  Future Appointments   Date Time Provider Cherie Alvarez   10/26/2021  3:45 PM CASSI Baldwin [facility]   10/29/2021  8:30 AM Vandana Castaneda MD [facility] Nap 4

## 2021-10-25 NOTE — TELEPHONE ENCOUNTER
HAROLDO, Spoke to pt for TCM today(Kidney Stone). Pt does not have HFU appt scheduled at this time. He declined to schedule stating that he is following up with Urologist on 10/29.   TCM/HFU appt recommended by 11/5/2021 as pt is a moderate risk for readmission

## 2021-10-25 NOTE — PROGRESS NOTES
Initial Post Discharge Follow Up   Discharge Date: 10/22/21  Contact Date: 10/25/2021    Consent Verification:  Assessment Completed With: Patient  HIPAA Verified?   Yes    Discharge Dx:   Kidney stone      Was TCC ordered: no      General:   • How have for 7 days. 14 tablet 0   • ondansetron 4 MG Oral Tablet Dispersible Take 1 tablet (4 mg total) by mouth every 4 (four) hours as needed for Nausea.  10 tablet 0   • HYDROcodone-acetaminophen 5-325 MG Oral Tab Take 1-2 tablets by mouth every 6 (six) hours as hospital? Yes  • May I go over your medications with you to make sure we are not missing anything? yes  • Are there any reasons that keep you from taking your medication as prescribed? No  Are you having any concerns with constipation?  Yes    Referrals/orde make your appointments? No     NCM Reviewed upcoming Specialist Appt with patient     Yes DM follow up on 10/26 and Uro on 10/29       Interventions by NCM: NCM reviewed discharge instructions and when to seek medical attention with the patient.  He state

## 2021-10-25 NOTE — TELEPHONE ENCOUNTER
Pt states if he doesn't take his pain medication he has a lot of pain but when he does he's fine asking is that normal please advise

## 2021-10-26 ENCOUNTER — OFFICE VISIT (OUTPATIENT)
Dept: ENDOCRINOLOGY CLINIC | Facility: CLINIC | Age: 41
End: 2021-10-26
Payer: COMMERCIAL

## 2021-10-26 VITALS
SYSTOLIC BLOOD PRESSURE: 122 MMHG | OXYGEN SATURATION: 98 % | RESPIRATION RATE: 16 BRPM | HEART RATE: 82 BPM | DIASTOLIC BLOOD PRESSURE: 64 MMHG | WEIGHT: 206.38 LBS | BODY MASS INDEX: 28 KG/M2

## 2021-10-26 DIAGNOSIS — E10.65 TYPE 1 DIABETES MELLITUS WITH HYPERGLYCEMIA (HCC): Primary | ICD-10-CM

## 2021-10-26 DIAGNOSIS — I10 ESSENTIAL HYPERTENSION: ICD-10-CM

## 2021-10-26 DIAGNOSIS — E78.5 DYSLIPIDEMIA: ICD-10-CM

## 2021-10-26 PROCEDURE — 99214 OFFICE O/P EST MOD 30 MIN: CPT | Performed by: NURSE PRACTITIONER

## 2021-10-26 PROCEDURE — 3078F DIAST BP <80 MM HG: CPT | Performed by: NURSE PRACTITIONER

## 2021-10-26 PROCEDURE — 3074F SYST BP LT 130 MM HG: CPT | Performed by: NURSE PRACTITIONER

## 2021-10-26 RX ORDER — INSULIN ASPART 100 [IU]/ML
INJECTION, SOLUTION INTRAVENOUS; SUBCUTANEOUS
Qty: 45 ML | Refills: 1 | Status: SHIPPED | OUTPATIENT
Start: 2021-10-26

## 2021-10-26 NOTE — PATIENT INSTRUCTIONS
We are here to help you manage your diabetes.  Please continue with your primary care physician/provider for your routine health care maintenance     Your A1C: 8.5% (7.9% last result)   This is  too high for you and we will continue to work together on miriam · Shakiness or dizziness  · Cold, clammy skin or sweating  · Feeling hungry  · Headache  · Nervousness  · A hard, fast heartbeat  · Weakness  · Confusion or irritability  · Blurred eyesight  · Having nightmares or waking up confused or sweating  · [facility] preferred pharmacy does not have the requested medication in stock (e.g. Backordered), it is your responsibility to find another pharmacy that has the requested medication available.   We will gladly send a new prescription to that pharmacy at your request.

## 2021-10-26 NOTE — PROGRESS NOTES
Agus Serrano is a 46year old male who presents today for type 1 diabetes management. Primary care physician: Chris Faith MD     In the past 3m DM control has increased.   Most recent  A1C 8.5% 10-  ( last A1C 7.9%)   Stopped using IC ratio fo 01/15/2021    LDL 93 01/15/2021    MICROALBCREA  01/14/2019      Comment:        Unable to calculate due to Urine Microalbumin <0.5 mg/dL      CREATSERUM 1.09 10/22/2021    GFRNAA 85 10/22/2021    GFRAA 98 10/22/2021    AST 22 10/21/2021    ALT 32 10/21/20 Subcutaneous Solution Pen-injector Using up to 50 units daily as directed in divided doses at meals 45 mL 1   • HYDROcodone-acetaminophen (NORCO) 5-325 MG Oral Tab Take 1 tablet by mouth every 6 (six) hours as needed for Pain.  30 tablet 0   • docusate sodi 2     DM associated review of  symptoms:   Endocrine: Polyuria, polyphagia, polydipsia: no  Neurological: Paresthesias: no  HEENT: Blurred vision: no  Skin: no rash or wounds  Hematological: Hypoglycemia: rare, wearing dexcom     Review of Systems     LUNG on A1C and blood sugar targets (Fasting < 130 and post prandial <023 ) and complications associated with hyperglycemia and uncontrolled DM (on AVS)   Recommended SMBG 4- x daily if not wearing CGM   Reviewed s/s and treatment of hypoglycemia (on AVS)   Con

## 2021-10-29 ENCOUNTER — PROCEDURE (OUTPATIENT)
Dept: SURGERY | Facility: CLINIC | Age: 41
End: 2021-10-29
Payer: COMMERCIAL

## 2021-10-29 ENCOUNTER — TELEPHONE (OUTPATIENT)
Dept: SURGERY | Facility: CLINIC | Age: 41
End: 2021-10-29

## 2021-10-29 VITALS — HEART RATE: 97 BPM | SYSTOLIC BLOOD PRESSURE: 138 MMHG | DIASTOLIC BLOOD PRESSURE: 87 MMHG

## 2021-10-29 DIAGNOSIS — N20.0 RECURRENT KIDNEY STONES: Primary | ICD-10-CM

## 2021-10-29 DIAGNOSIS — T19.1XXA FOREIGN BODY IN BLADDER, INITIAL ENCOUNTER: ICD-10-CM

## 2021-10-29 PROCEDURE — 81003 URINALYSIS AUTO W/O SCOPE: CPT | Performed by: UROLOGY

## 2021-10-29 PROCEDURE — 3075F SYST BP GE 130 - 139MM HG: CPT | Performed by: UROLOGY

## 2021-10-29 PROCEDURE — 3079F DIAST BP 80-89 MM HG: CPT | Performed by: UROLOGY

## 2021-10-29 PROCEDURE — 99213 OFFICE O/P EST LOW 20 MIN: CPT | Performed by: UROLOGY

## 2021-10-29 PROCEDURE — 52000 CYSTOURETHROSCOPY: CPT | Performed by: UROLOGY

## 2021-10-29 RX ORDER — CIPROFLOXACIN 500 MG/1
500 TABLET, FILM COATED ORAL ONCE
Status: SHIPPED | OUTPATIENT
Start: 2021-10-29

## 2021-10-29 NOTE — TELEPHONE ENCOUNTER
Patient is scheduled for in office cystoscopy with stent removal CPT 70859 on Friday, October 29, 2021. Per Stephie Hill at [facility]/[phone] CPT 19789 rendered in office by local in network provider no prior authorization is required.     Reference number: [id number]

## 2021-11-18 RX ORDER — PEN NEEDLE, DIABETIC 32GX 5/32"
NEEDLE, DISPOSABLE MISCELLANEOUS
Qty: 500 EACH | Refills: 3 | Status: SHIPPED | OUTPATIENT
Start: 2021-11-18

## 2021-12-23 RX ORDER — INSULIN DEGLUDEC INJECTION 100 U/ML
INJECTION, SOLUTION SUBCUTANEOUS
Qty: 15 ML | Refills: 0 | Status: SHIPPED | OUTPATIENT
Start: 2021-12-23 | End: 2022-03-29

## 2022-01-10 ENCOUNTER — TELEPHONE (OUTPATIENT)
Dept: ENDOCRINOLOGY CLINIC | Facility: CLINIC | Age: 42
End: 2022-01-10

## 2022-01-10 DIAGNOSIS — E11.65 TYPE 2 DIABETES MELLITUS WITH HYPERGLYCEMIA, WITH LONG-TERM CURRENT USE OF INSULIN (HCC): Primary | ICD-10-CM

## 2022-01-10 DIAGNOSIS — Z79.4 TYPE 2 DIABETES MELLITUS WITH HYPERGLYCEMIA, WITH LONG-TERM CURRENT USE OF INSULIN (HCC): Primary | ICD-10-CM

## 2022-01-10 NOTE — TELEPHONE ENCOUNTER
Orders Placed This Encounter      HGB A1C [496] [Q]          Order Specific Question: Release to patient          Answer: Immediate      Comp Metabolic Panel (14)      Lipid Panel      Microalb/Creat Ratio, Random Urine      TSH W Reflex To Free T4

## 2022-02-05 LAB
AMB EXT CHOL/HDL RATIO: 4
AMB EXT CHOLESTEROL, TOTAL: 166 MG/DL
AMB EXT GLUCOSE: 118 MG/DL
AMB EXT HDL CHOLESTEROL: 41 MG/DL
AMB EXT NON HDL CHOL: 125 MG/DL
AMB EXT TRIGLYCERIDES: 84 MG/DL

## 2022-02-05 PROCEDURE — 3051F HG A1C>EQUAL 7.0%<8.0%: CPT | Performed by: FAMILY MEDICINE

## 2022-02-06 LAB — HEMOGLOBIN A1C: 7.6 % OF TOTAL HGB

## 2022-02-10 ENCOUNTER — TELEMEDICINE (OUTPATIENT)
Dept: ENDOCRINOLOGY CLINIC | Facility: CLINIC | Age: 42
End: 2022-02-10

## 2022-02-10 DIAGNOSIS — E10.65 TYPE 1 DIABETES MELLITUS WITH HYPERGLYCEMIA (HCC): Primary | ICD-10-CM

## 2022-02-10 DIAGNOSIS — E78.5 DYSLIPIDEMIA: ICD-10-CM

## 2022-02-10 PROCEDURE — 99214 OFFICE O/P EST MOD 30 MIN: CPT | Performed by: NURSE PRACTITIONER

## 2022-02-10 PROCEDURE — 95251 CONT GLUC MNTR ANALYSIS I&R: CPT | Performed by: NURSE PRACTITIONER

## 2022-02-10 RX ORDER — PEN NEEDLE, DIABETIC 32GX 5/32"
NEEDLE, DISPOSABLE MISCELLANEOUS
Qty: 500 EACH | Refills: 3 | Status: SHIPPED | OUTPATIENT
Start: 2022-02-10

## 2022-02-10 RX ORDER — BLOOD-GLUCOSE SENSOR
1 EACH MISCELLANEOUS
Qty: 3 EACH | Refills: 12 | COMMUNITY
Start: 2022-02-10

## 2022-02-10 RX ORDER — BLOOD-GLUCOSE TRANSMITTER
EACH MISCELLANEOUS
Qty: 1 EACH | Refills: 3 | COMMUNITY
Start: 2022-02-10

## 2022-02-12 ENCOUNTER — PATIENT MESSAGE (OUTPATIENT)
Dept: ENDOCRINOLOGY CLINIC | Facility: CLINIC | Age: 42
End: 2022-02-12

## 2022-02-14 ENCOUNTER — TELEPHONE (OUTPATIENT)
Dept: ENDOCRINOLOGY CLINIC | Facility: CLINIC | Age: 42
End: 2022-02-14

## 2022-02-14 NOTE — TELEPHONE ENCOUNTER
Pt sent in blood work results from [facility] via my chart.    Collected date: 2/5/22  Abstracted 2/14/22    Lipid panel   Chol 166 mg  HDL 41 mg   Trig 84 mg  Chol/HDC ratio 4.0  Chol/ mg     Glucose 118 mg

## 2022-02-14 NOTE — TELEPHONE ENCOUNTER
From: Evelyn Spencer  To: Andriy Valadez, APRN  Sent: 2/12/2022 12:41 PM CST  Subject: Lipid Panel Results from [id number],  I attached my results from the Biometric Screening.   Porfirio Stewart

## 2022-02-14 NOTE — TELEPHONE ENCOUNTER
Reviewed labs   Needs CMP and urine m/alb and TSH for annual diabetes labs   [facility]  No need to fast for these labs  Have done prior to May 2022 -my chart message sent

## 2022-03-18 ENCOUNTER — TELEPHONE (OUTPATIENT)
Dept: FAMILY MEDICINE CLINIC | Facility: CLINIC | Age: 42
End: 2022-03-18

## 2022-03-18 NOTE — TELEPHONE ENCOUNTER
Recd fax from [facility] for 90 day refill on the Metformin  mg tablets for quantity of 180.

## 2022-03-23 RX ORDER — ATORVASTATIN CALCIUM 20 MG/1
TABLET, FILM COATED ORAL
Qty: 90 TABLET | Refills: 0 | Status: SHIPPED | OUTPATIENT
Start: 2022-03-23

## 2022-03-29 RX ORDER — INSULIN DEGLUDEC INJECTION 100 U/ML
INJECTION, SOLUTION SUBCUTANEOUS
Qty: 30 ML | Refills: 0 | Status: SHIPPED | OUTPATIENT
Start: 2022-03-29 | End: 2022-09-29

## 2022-04-04 ENCOUNTER — OFFICE VISIT (OUTPATIENT)
Dept: FAMILY MEDICINE CLINIC | Facility: CLINIC | Age: 42
End: 2022-04-04
Payer: COMMERCIAL

## 2022-04-04 VITALS
WEIGHT: 201.38 LBS | OXYGEN SATURATION: 98 % | BODY MASS INDEX: 27.27 KG/M2 | DIASTOLIC BLOOD PRESSURE: 80 MMHG | SYSTOLIC BLOOD PRESSURE: 110 MMHG | RESPIRATION RATE: 16 BRPM | TEMPERATURE: 98 F | HEART RATE: 77 BPM | HEIGHT: 72 IN

## 2022-04-04 DIAGNOSIS — E10.65 TYPE 1 DIABETES MELLITUS WITH HYPERGLYCEMIA (HCC): Primary | ICD-10-CM

## 2022-04-04 PROCEDURE — 3008F BODY MASS INDEX DOCD: CPT | Performed by: FAMILY MEDICINE

## 2022-04-04 PROCEDURE — 3079F DIAST BP 80-89 MM HG: CPT | Performed by: FAMILY MEDICINE

## 2022-04-04 PROCEDURE — 99395 PREV VISIT EST AGE 18-39: CPT | Performed by: FAMILY MEDICINE

## 2022-04-04 PROCEDURE — 3074F SYST BP LT 130 MM HG: CPT | Performed by: FAMILY MEDICINE

## 2022-04-21 ENCOUNTER — TELEPHONE (OUTPATIENT)
Dept: ENDOCRINOLOGY CLINIC | Facility: CLINIC | Age: 42
End: 2022-04-21

## 2022-04-21 NOTE — TELEPHONE ENCOUNTER
Received DWO from [facility] on CGM sensors. CAB signed and faxed to [phone] sent fax with OV notes.  Fax confirmed and sent form to scan

## 2022-05-05 ENCOUNTER — TELEPHONE (OUTPATIENT)
Dept: ENDOCRINOLOGY CLINIC | Facility: CLINIC | Age: 42
End: 2022-05-05

## 2022-05-05 RX ORDER — BLOOD-GLUCOSE SENSOR
1 EACH MISCELLANEOUS
Qty: 9 EACH | Refills: 2 | Status: SHIPPED | OUTPATIENT
Start: 2022-05-05

## 2022-05-05 NOTE — TELEPHONE ENCOUNTER
Pt called. Verified name and . Pt requesting that Dexcom sensors only be sent to [email or web address] instead of [facility]. He still has a 3 month supply of sensors from [email or web address]. His co-pay will be less using the pharmacy benefit for sensors. He wants the transmitter left with [facility]. New RX pended for signature.  Will need prior auth

## 2022-05-05 NOTE — TELEPHONE ENCOUNTER
Received fax for PA on dexcom. Sent PA form to [facility] -[phone] with face sheet and LOV notes.  Fax confirmed

## 2022-05-10 RX ORDER — INSULIN ASPART 100 [IU]/ML
INJECTION, SOLUTION INTRAVENOUS; SUBCUTANEOUS
Qty: 45 ML | Refills: 0 | Status: SHIPPED | OUTPATIENT
Start: 2022-05-10

## 2022-05-19 ENCOUNTER — TELEPHONE (OUTPATIENT)
Dept: ENDOCRINOLOGY CLINIC | Facility: CLINIC | Age: 42
End: 2022-05-19

## 2022-05-19 DIAGNOSIS — E10.65 TYPE 1 DIABETES MELLITUS WITH HYPERGLYCEMIA (HCC): Primary | ICD-10-CM

## 2022-05-19 NOTE — TELEPHONE ENCOUNTER
Please sign pended A1C lab for [facility] and add any additional labs needed for upcoming video visit on 6/9/22.

## 2022-05-19 NOTE — TELEPHONE ENCOUNTER
Called to check status of PA for Dexcom - Patient does not have Rx coverage through [facility] - he has  pharmacy coverage through [address] [place] [address] details listed below. PA Dept Phone # [phone]     Rx Maurice Landeros [id number]   Rx Nisha Flores grp RX [address] [place] [address]    Liz Hubbard [phone]    Brady Brooks [id number]    [phone]  Pharmacy help desk [phone]    PA was submitted to [facility] and should have been submitted through [address] [place] [address]. Called [phone] completed PA over the phone with representative. Approved through 5/19/2023 PA # [id number]    Called pharmacy to confirm sensors are showing covered and called patient to let him know.

## 2022-05-19 NOTE — TELEPHONE ENCOUNTER
Orders Placed This Encounter      HGB A1C [496] [Q]          Order Specific Question: Release to patient          Answer: Immediate      Microalb/Creat Ratio, Random Urine      Comp Metabolic Panel (14)      TSH W Reflex To Free T4          Order Specific Question: Release to patient          Answer: Immediate

## 2022-06-03 ENCOUNTER — TELEPHONE (OUTPATIENT)
Dept: FAMILY MEDICINE CLINIC | Facility: CLINIC | Age: 42
End: 2022-06-03

## 2022-06-03 NOTE — TELEPHONE ENCOUNTER
Patient calling, received [facility] message that patient is due for Microalbumin/UA testing. Patient will be going to [address] [place] [address] lab to get other lab work ordered by [facility]. Please advise on poss additional orders PCP would like him to do.

## 2022-06-05 LAB
ALBUMIN/GLOBULIN RATIO: 1.6 (CALC) (ref 1–2.5)
ALBUMIN: 4.1 G/DL (ref 3.6–5.1)
ALKALINE PHOSPHATASE: 124 U/L (ref 36–130)
ALT: 16 U/L (ref 9–46)
AST: 16 U/L (ref 10–40)
BILIRUBIN, TOTAL: 0.5 MG/DL (ref 0.2–1.2)
BUN: 10 MG/DL (ref 7–25)
CALCIUM: 9.8 MG/DL (ref 8.6–10.3)
CARBON DIOXIDE: 32 MMOL/L (ref 20–32)
CHLORIDE: 105 MMOL/L (ref 98–110)
CREATININE, RANDOM URINE: 101 MG/DL (ref 20–320)
CREATININE: 0.8 MG/DL (ref 0.6–1.35)
EGFR IF AFRICN AM: 130 ML/MIN/1.73M2
EGFR IF NONAFRICN AM: 113 ML/MIN/1.73M2
GLOBULIN: 2.6 G/DL (CALC) (ref 1.9–3.7)
GLUCOSE: 180 MG/DL (ref 65–99)
HEMOGLOBIN A1C: 7.9 % OF TOTAL HGB
MICROALBUMIN/CREATININE RATIO, RANDOM URINE: 3 MCG/MG CREAT
MICROALBUMIN: 0.3 MG/DL
POTASSIUM: 4.9 MMOL/L (ref 3.5–5.3)
PROTEIN, TOTAL: 6.7 G/DL (ref 6.1–8.1)
SODIUM: 140 MMOL/L (ref 135–146)
TSH W/REFLEX TO FT4: 0.83 MIU/L (ref 0.4–4.5)

## 2022-06-06 ENCOUNTER — TELEPHONE (OUTPATIENT)
Dept: ENDOCRINOLOGY CLINIC | Facility: CLINIC | Age: 42
End: 2022-06-06

## 2022-06-06 NOTE — TELEPHONE ENCOUNTER
Received fax from [employer] for renewal rx form to be signed and faxed with [facility] notes senti too [phone] fax confirmed and sending to scan

## 2022-06-09 ENCOUNTER — TELEMEDICINE (OUTPATIENT)
Dept: ENDOCRINOLOGY CLINIC | Facility: CLINIC | Age: 42
End: 2022-06-09

## 2022-06-20 ENCOUNTER — TELEPHONE (OUTPATIENT)
Dept: ENDOCRINOLOGY CLINIC | Facility: CLINIC | Age: 42
End: 2022-06-20

## 2022-06-20 NOTE — TELEPHONE ENCOUNTER
Received letter from Dr Sheri Munoz for pt surgical clearance CAB made letter for pt faxed with form too [phone] fax confirmed sending copy to scan

## 2022-06-22 DIAGNOSIS — E11.65 TYPE 2 DIABETES MELLITUS WITH HYPERGLYCEMIA (HCC): ICD-10-CM

## 2022-06-23 RX ORDER — ATORVASTATIN CALCIUM 20 MG/1
TABLET, FILM COATED ORAL
Qty: 90 TABLET | Refills: 0 | Status: SHIPPED | OUTPATIENT
Start: 2022-06-23

## 2022-07-05 DIAGNOSIS — E11.65 TYPE 2 DIABETES MELLITUS WITH HYPERGLYCEMIA (HCC): ICD-10-CM

## 2022-07-06 NOTE — TELEPHONE ENCOUNTER
Last refill temp refill, 6/22/22 , pt needed to get diabetic eye exam done and send report. Med denied.

## 2022-08-23 ENCOUNTER — PATIENT MESSAGE (OUTPATIENT)
Dept: ENDOCRINOLOGY CLINIC | Facility: CLINIC | Age: 42
End: 2022-08-23

## 2022-08-23 NOTE — TELEPHONE ENCOUNTER
From: [facility]  To: CASSI Royal  Sent: 8/23/2022 11:03 AM CDT  Subject: Drivers License Medical Form    Suraj Spivey,    I just found out I need to report my Diabetes for my Drivers License. Could you please fill out the attached Form and send it back to me, so I can mail it out to the ?     Nissa Reynolds

## 2022-08-24 RX ORDER — INSULIN ASPART 100 [IU]/ML
INJECTION, SOLUTION INTRAVENOUS; SUBCUTANEOUS
Qty: 45 ML | Refills: 0 | Status: SHIPPED | OUTPATIENT
Start: 2022-08-24

## 2022-09-12 ENCOUNTER — TELEPHONE (OUTPATIENT)
Dept: ENDOCRINOLOGY CLINIC | Facility: CLINIC | Age: 42
End: 2022-09-12

## 2022-09-12 DIAGNOSIS — E10.65 TYPE 1 DIABETES MELLITUS WITH HYPERGLYCEMIA (HCC): Primary | ICD-10-CM

## 2022-09-12 NOTE — TELEPHONE ENCOUNTER
Pt called has video visit with David Cuevas 9/22 needs labs sent to [facility]

## 2022-09-12 NOTE — TELEPHONE ENCOUNTER
Pt with video visit next week,requests orders to [facility].  Annual labs completed 06/04/2022 with exception of lipid panel on 02/05/2022.   Future Appointments   Date Time Provider Cherie Alvarez   9/22/2022 11:45 AM Madhav Dubois, APRN [facility]

## 2022-09-14 NOTE — TELEPHONE ENCOUNTER
Pt called in to see if lab was ordered, still pending.  Allyssa, okay to sign off on A1C order to [facility] .

## 2022-09-18 LAB — HEMOGLOBIN A1C: 8.4 % OF TOTAL HGB

## 2022-09-22 ENCOUNTER — TELEMEDICINE (OUTPATIENT)
Dept: ENDOCRINOLOGY CLINIC | Facility: CLINIC | Age: 42
End: 2022-09-22

## 2022-09-22 DIAGNOSIS — E10.65 TYPE 1 DIABETES MELLITUS WITH HYPERGLYCEMIA (HCC): Primary | ICD-10-CM

## 2022-09-22 PROCEDURE — 99214 OFFICE O/P EST MOD 30 MIN: CPT | Performed by: NURSE PRACTITIONER

## 2022-09-22 PROCEDURE — 95251 CONT GLUC MNTR ANALYSIS I&R: CPT | Performed by: NURSE PRACTITIONER

## 2022-09-22 RX ORDER — GLUCAGON INJECTION, SOLUTION 1 MG/.2ML
1 INJECTION, SOLUTION SUBCUTANEOUS AS NEEDED
Qty: 0.4 ML | Refills: 1 | Status: SHIPPED | OUTPATIENT
Start: 2022-09-22

## 2022-09-23 RX ORDER — ATORVASTATIN CALCIUM 20 MG/1
TABLET, FILM COATED ORAL
Qty: 90 TABLET | Refills: 0 | Status: SHIPPED | OUTPATIENT
Start: 2022-09-23

## 2022-09-29 RX ORDER — INSULIN DEGLUDEC INJECTION 100 U/ML
INJECTION, SOLUTION SUBCUTANEOUS
Qty: 30 ML | Refills: 1 | Status: SHIPPED | OUTPATIENT
Start: 2022-09-29 | End: 2023-09-21

## 2022-10-07 ENCOUNTER — HOSPITAL ENCOUNTER (OUTPATIENT)
Dept: GENERAL RADIOLOGY | Age: 42
Discharge: HOME | End: 2022-10-07
Attending: UROLOGY
Payer: COMMERCIAL

## 2022-10-07 DIAGNOSIS — N20.0 RECURRENT KIDNEY STONES: ICD-10-CM

## 2022-10-07 PROCEDURE — 74018 RADEX ABDOMEN 1 VIEW: CPT | Performed by: UROLOGY

## 2022-10-12 ENCOUNTER — TELEPHONE (OUTPATIENT)
Dept: ENDOCRINOLOGY CLINIC | Facility: CLINIC | Age: 42
End: 2022-10-12

## 2022-10-12 NOTE — TELEPHONE ENCOUNTER
Dexcom Clarity report downloaded/emailed to diabetes provider upon request.     Sensor type: Dexcom Clarity  CGM Analysis of data: dates  9/29-10/12   Average glucose : 189 mg/dl     Glucose Management Indicator (GMI): 7.8%  Standard Deviation: 62 mg/dL    16% time above 250 mg/dl  (recommended: <5%)  37% time above 180mg /dl  (recommended: <25%)  46% time in target range:  mg/dl (recommended 70%)  <1% time below target range: 70mg/dl (recommended <5%)  <1% time below severe low less than 55 mg/dl (recommended <1%)    Plan: CGMS download--copy sent to scan for view in media tab.

## 2022-12-01 RX ORDER — INSULIN ASPART 100 [IU]/ML
INJECTION, SOLUTION INTRAVENOUS; SUBCUTANEOUS
Qty: 45 ML | Refills: 0 | Status: SHIPPED | OUTPATIENT
Start: 2022-12-01

## 2022-12-17 ENCOUNTER — PATIENT MESSAGE (OUTPATIENT)
Dept: ENDOCRINOLOGY CLINIC | Facility: CLINIC | Age: 42
End: 2022-12-17

## 2022-12-17 LAB — HEMOGLOBIN A1C: 7.9 % OF TOTAL HGB

## 2022-12-19 NOTE — TELEPHONE ENCOUNTER
From: Gina Mock  To: CASSI Balderas  Sent: 12/17/2022 8:16 AM CST  Subject: A1c is 7.9%    Good Morning Kelly Stacks,    I took my a1c yesterday and it went down to 7.9%. I know that's still high, but im working everyday to keep my glucose levels down. Have a happy holiday season.     Lupis Lentz
PROCEDURES:  Cystoscopy, with TURP 06-May-2022 09:22:16  Pamela Cruz

## 2022-12-20 RX ORDER — ATORVASTATIN CALCIUM 20 MG/1
TABLET, FILM COATED ORAL
Qty: 90 TABLET | Refills: 0 | Status: SHIPPED | OUTPATIENT
Start: 2022-12-20

## 2023-02-23 ENCOUNTER — TELEPHONE (OUTPATIENT)
Dept: ENDOCRINOLOGY CLINIC | Facility: CLINIC | Age: 43
End: 2023-02-23

## 2023-02-23 DIAGNOSIS — Z00.00 PERIODIC HEALTH ASSESSMENT, GENERAL SCREENING, ADULT: ICD-10-CM

## 2023-02-23 DIAGNOSIS — E10.65 TYPE 1 DIABETES MELLITUS WITH HYPERGLYCEMIA (HCC): Primary | ICD-10-CM

## 2023-02-23 NOTE — TELEPHONE ENCOUNTER
He needs all labs done for annual diabetes: blood and urine Orders Placed This Encounter      Comp Metabolic Panel (14)      Lipid Panel      TSH W Reflex To Free T4          Order Specific Question: Release to patient          Answer: Immediate      Microalb/Creat Ratio, Random Urine      Hemoglobin A1C    Will send pt my chart message

## 2023-02-23 NOTE — TELEPHONE ENCOUNTER
Pt LM stating he needs A1C for next OV. Elsa Tarango would you like him to do this prior to OV or do at [address]?      Future Appointments   Date Time Provider Cherie Alvarez   3/2/2023  1:00 PM CASSI James [facility]

## 2023-02-28 LAB
ALBUMIN/GLOBULIN RATIO: 1.9 (CALC) (ref 1–2.5)
ALBUMIN: 4.5 G/DL (ref 3.6–5.1)
ALKALINE PHOSPHATASE: 119 U/L (ref 36–130)
ALT: 19 U/L (ref 9–46)
AST: 19 U/L (ref 10–40)
BILIRUBIN, TOTAL: 0.6 MG/DL (ref 0.2–1.2)
BUN: 15 MG/DL (ref 7–25)
CALCIUM: 9.7 MG/DL (ref 8.6–10.3)
CARBON DIOXIDE: 30 MMOL/L (ref 20–32)
CHLORIDE: 102 MMOL/L (ref 98–110)
CHOL/HDLC RATIO: 4.2 (CALC)
CHOLESTEROL, TOTAL: 158 MG/DL
CREATININE, RANDOM URINE: 185 MG/DL (ref 20–320)
CREATININE: 0.83 MG/DL (ref 0.6–1.29)
EGFR: 113 ML/MIN/1.73M2
GLOBULIN: 2.4 G/DL (CALC) (ref 1.9–3.7)
GLUCOSE: 238 MG/DL (ref 65–99)
HDL CHOLESTEROL: 38 MG/DL
HEMOGLOBIN A1C: 7.8 % OF TOTAL HGB
LDL-CHOLESTEROL: 99 MG/DL (CALC)
MICROALBUMIN/CREATININE RATIO, RANDOM URINE: 3 MCG/MG CREAT
MICROALBUMIN: 0.6 MG/DL
NON-HDL CHOLESTEROL: 120 MG/DL (CALC)
POTASSIUM: 4.8 MMOL/L (ref 3.5–5.3)
PROTEIN, TOTAL: 6.9 G/DL (ref 6.1–8.1)
SODIUM: 138 MMOL/L (ref 135–146)
TRIGLYCERIDES: 111 MG/DL
TSH W/REFLEX TO FT4: 1.24 MIU/L (ref 0.4–4.5)

## 2023-03-02 ENCOUNTER — TELEMEDICINE (OUTPATIENT)
Dept: ENDOCRINOLOGY CLINIC | Facility: CLINIC | Age: 43
End: 2023-03-02
Payer: COMMERCIAL

## 2023-03-02 DIAGNOSIS — E10.65 TYPE 1 DIABETES MELLITUS WITH HYPERGLYCEMIA (HCC): Primary | ICD-10-CM

## 2023-03-02 DIAGNOSIS — E78.5 DYSLIPIDEMIA: ICD-10-CM

## 2023-03-02 PROCEDURE — 99214 OFFICE O/P EST MOD 30 MIN: CPT | Performed by: NURSE PRACTITIONER

## 2023-03-02 PROCEDURE — 95251 CONT GLUC MNTR ANALYSIS I&R: CPT | Performed by: NURSE PRACTITIONER

## 2023-03-10 ENCOUNTER — TELEPHONE (OUTPATIENT)
Dept: ENDOCRINOLOGY CLINIC | Facility: CLINIC | Age: 43
End: 2023-03-10

## 2023-03-10 NOTE — TELEPHONE ENCOUNTER
REcieved fax from Dr Wenona Castleman office wanting clearene for pt upcoming oral surgery on March 14. Emailed document to Robert and left on her desk.

## 2023-03-20 RX ORDER — INSULIN ASPART 100 [IU]/ML
INJECTION, SOLUTION INTRAVENOUS; SUBCUTANEOUS
Qty: 45 ML | Refills: 1 | Status: SHIPPED | OUTPATIENT
Start: 2023-03-20

## 2023-03-21 DIAGNOSIS — E11.65 TYPE 2 DIABETES MELLITUS WITH HYPERGLYCEMIA (HCC): ICD-10-CM

## 2023-03-23 RX ORDER — ATORVASTATIN CALCIUM 20 MG/1
TABLET, FILM COATED ORAL
Qty: 90 TABLET | Refills: 0 | Status: SHIPPED | OUTPATIENT
Start: 2023-03-23

## 2023-04-19 RX ORDER — PROCHLORPERAZINE 25 MG/1
1 SUPPOSITORY RECTAL
Qty: 9 EACH | Refills: 2 | Status: SHIPPED | OUTPATIENT
Start: 2023-04-19

## 2023-04-19 RX ORDER — PEN NEEDLE, DIABETIC 32GX 5/32"
NEEDLE, DISPOSABLE MISCELLANEOUS
Qty: 500 EACH | Refills: 3 | Status: SHIPPED | OUTPATIENT
Start: 2023-04-19

## 2023-05-31 ENCOUNTER — TELEPHONE (OUTPATIENT)
Dept: ENDOCRINOLOGY CLINIC | Facility: CLINIC | Age: 43
End: 2023-05-31

## 2023-05-31 DIAGNOSIS — E10.65 TYPE 1 DIABETES MELLITUS WITH HYPERGLYCEMIA (HCC): Primary | ICD-10-CM

## 2023-05-31 NOTE — TELEPHONE ENCOUNTER
Pt called office and requested labs be ordered to [facility] for upcoming visit. Pended A1C lab. Please order any additional labs needed.

## 2023-06-21 NOTE — TELEPHONE ENCOUNTER
Name from pharmacy: ATORVASTATIN 20 MG TABLET          Will file in chart as: ATORVASTATIN 20 MG Oral Tab    Sig: TAKE 1 TABLET BY MOUTH EVERY DAY AT NIGHT    Disp: 90 tablet    Refills: 0 (Pharmacy requested: Not specified)    Start: 6/19/2023    Class: Normal    Non-formulary    Last ordered: 3 months ago by Cyndee Flowers MD Last refill: 3/23/2023    Rx #: [id number]    Cholesterol Medication Protocol [id number]/19/2023 12:18 AM   Protocol Details Appointment within past 12 or next 3 months    ALT < 80    ALT resulted within past year    Lipid panel within past 12 months      To be filled at: [id number], [address] [place] [address] [address] AT [facility], [phone], [phone]     Future Appointments   Date Time Provider Cherie Alvarez   6/29/2023 10:45 AM CASSI Eric [facility]     LOV: 4/4/22  Last r/f: 3/23/23 # 90 0 r/fs  Labs: 2/27/23       Please advise

## 2023-06-22 RX ORDER — ATORVASTATIN CALCIUM 20 MG/1
TABLET, FILM COATED ORAL
Qty: 90 TABLET | Refills: 0 | Status: SHIPPED | OUTPATIENT
Start: 2023-06-22

## 2023-06-27 LAB — HEMOGLOBIN A1C: 8.5 % OF TOTAL HGB

## 2023-06-29 ENCOUNTER — TELEMEDICINE (OUTPATIENT)
Dept: ENDOCRINOLOGY CLINIC | Facility: CLINIC | Age: 43
End: 2023-06-29
Payer: COMMERCIAL

## 2023-06-29 DIAGNOSIS — E78.5 DYSLIPIDEMIA: ICD-10-CM

## 2023-06-29 DIAGNOSIS — E10.65 TYPE 1 DIABETES MELLITUS WITH HYPERGLYCEMIA (HCC): Primary | ICD-10-CM

## 2023-06-29 PROCEDURE — 95251 CONT GLUC MNTR ANALYSIS I&R: CPT | Performed by: NURSE PRACTITIONER

## 2023-06-29 PROCEDURE — 99214 OFFICE O/P EST MOD 30 MIN: CPT | Performed by: NURSE PRACTITIONER

## 2023-07-17 ENCOUNTER — APPOINTMENT (OUTPATIENT)
Dept: CT IMAGING | Age: 43
End: 2023-07-17
Attending: PHYSICIAN ASSISTANT
Payer: COMMERCIAL

## 2023-07-17 ENCOUNTER — HOSPITAL ENCOUNTER (EMERGENCY)
Age: 43
Discharge: HOME | End: 2023-07-17
Attending: EMERGENCY MEDICINE
Payer: COMMERCIAL

## 2023-07-17 VITALS
BODY MASS INDEX: 26.41 KG/M2 | SYSTOLIC BLOOD PRESSURE: 128 MMHG | WEIGHT: 195 LBS | HEART RATE: 85 BPM | TEMPERATURE: 98 F | OXYGEN SATURATION: 98 % | DIASTOLIC BLOOD PRESSURE: 85 MMHG | HEIGHT: 72 IN | RESPIRATION RATE: 16 BRPM

## 2023-07-17 DIAGNOSIS — N20.1 URETEROLITHIASIS: Primary | ICD-10-CM

## 2023-07-17 LAB
ALBUMIN SERPL-MCNC: 4 G/DL (ref 3.4–5)
ALBUMIN/GLOB SERPL: 1.1 {RATIO} (ref 1–2)
ALP LIVER SERPL-CCNC: 122 U/L
ALT SERPL-CCNC: 27 U/L
ANION GAP SERPL CALC-SCNC: 3 MMOL/L (ref 0–18)
AST SERPL-CCNC: 22 U/L (ref 15–37)
BASOPHILS # BLD AUTO: 0.02 X10(3) UL (ref 0–0.2)
BASOPHILS NFR BLD AUTO: 0.2 %
BILIRUB SERPL-MCNC: 0.6 MG/DL (ref 0.1–2)
BILIRUB UR QL STRIP.AUTO: NEGATIVE
BUN BLD-MCNC: 10 MG/DL (ref 7–18)
CALCIUM BLD-MCNC: 9 MG/DL (ref 8.5–10.1)
CHLORIDE SERPL-SCNC: 104 MMOL/L (ref 98–112)
CO2 SERPL-SCNC: 28 MMOL/L (ref 21–32)
COLOR UR AUTO: YELLOW
CREAT BLD-MCNC: 0.86 MG/DL
EOSINOPHIL # BLD AUTO: 0.25 X10(3) UL (ref 0–0.7)
EOSINOPHIL NFR BLD AUTO: 2.7 %
ERYTHROCYTE [DISTWIDTH] IN BLOOD BY AUTOMATED COUNT: 12.4 %
GFR SERPLBLD BASED ON 1.73 SQ M-ARVRAT: 112 ML/MIN/1.73M2 (ref 60–?)
GLOBULIN PLAS-MCNC: 3.5 G/DL (ref 2.8–4.4)
GLUCOSE BLD-MCNC: 265 MG/DL (ref 70–99)
GLUCOSE UR STRIP.AUTO-MCNC: 250 MG/DL
HCT VFR BLD AUTO: 45.4 %
HGB BLD-MCNC: 15 G/DL
IMM GRANULOCYTES # BLD AUTO: 0.02 X10(3) UL (ref 0–1)
IMM GRANULOCYTES NFR BLD: 0.2 %
KETONES UR STRIP.AUTO-MCNC: NEGATIVE MG/DL
LEUKOCYTE ESTERASE UR QL STRIP.AUTO: NEGATIVE
LIPASE SERPL-CCNC: 194 U/L (ref 13–75)
LYMPHOCYTES # BLD AUTO: 2.4 X10(3) UL (ref 1–4)
LYMPHOCYTES NFR BLD AUTO: 25.9 %
MCH RBC QN AUTO: 29.3 PG (ref 26–34)
MCHC RBC AUTO-ENTMCNC: 33 G/DL (ref 31–37)
MCV RBC AUTO: 88.7 FL
MONOCYTES # BLD AUTO: 0.41 X10(3) UL (ref 0.1–1)
MONOCYTES NFR BLD AUTO: 4.4 %
NEUTROPHILS # BLD AUTO: 6.18 X10 (3) UL (ref 1.5–7.7)
NEUTROPHILS # BLD AUTO: 6.18 X10(3) UL (ref 1.5–7.7)
NEUTROPHILS NFR BLD AUTO: 66.6 %
NITRITE UR QL STRIP.AUTO: NEGATIVE
OSMOLALITY SERPL CALC.SUM OF ELEC: 288 MOSM/KG (ref 275–295)
PH UR STRIP.AUTO: 5.5 [PH] (ref 5–8)
PLATELET # BLD AUTO: 239 10(3)UL (ref 150–450)
POTASSIUM SERPL-SCNC: 4 MMOL/L (ref 3.5–5.1)
PROT SERPL-MCNC: 7.5 G/DL (ref 6.4–8.2)
PROT UR STRIP.AUTO-MCNC: NEGATIVE MG/DL
RBC # BLD AUTO: 5.12 X10(6)UL
RBC #/AREA URNS AUTO: >10 /HPF
SODIUM SERPL-SCNC: 135 MMOL/L (ref 136–145)
SP GR UR STRIP.AUTO: 1.02 (ref 1–1.03)
UROBILINOGEN UR STRIP.AUTO-MCNC: 0.2 MG/DL
WBC # BLD AUTO: 9.3 X10(3) UL (ref 4–11)

## 2023-07-17 PROCEDURE — 85025 COMPLETE CBC W/AUTO DIFF WBC: CPT | Performed by: EMERGENCY MEDICINE

## 2023-07-17 PROCEDURE — 96361 HYDRATE IV INFUSION ADD-ON: CPT

## 2023-07-17 PROCEDURE — 99284 EMERGENCY DEPT VISIT MOD MDM: CPT

## 2023-07-17 PROCEDURE — 80053 COMPREHEN METABOLIC PANEL: CPT

## 2023-07-17 PROCEDURE — 85025 COMPLETE CBC W/AUTO DIFF WBC: CPT

## 2023-07-17 PROCEDURE — 80053 COMPREHEN METABOLIC PANEL: CPT | Performed by: EMERGENCY MEDICINE

## 2023-07-17 PROCEDURE — 81001 URINALYSIS AUTO W/SCOPE: CPT | Performed by: EMERGENCY MEDICINE

## 2023-07-17 PROCEDURE — 96374 THER/PROPH/DIAG INJ IV PUSH: CPT

## 2023-07-17 PROCEDURE — 81015 MICROSCOPIC EXAM OF URINE: CPT | Performed by: EMERGENCY MEDICINE

## 2023-07-17 PROCEDURE — 83690 ASSAY OF LIPASE: CPT | Performed by: EMERGENCY MEDICINE

## 2023-07-17 PROCEDURE — 83690 ASSAY OF LIPASE: CPT

## 2023-07-17 PROCEDURE — 74176 CT ABD & PELVIS W/O CONTRAST: CPT | Performed by: PHYSICIAN ASSISTANT

## 2023-07-17 RX ORDER — ONDANSETRON 4 MG/1
4 TABLET, ORALLY DISINTEGRATING ORAL EVERY 4 HOURS PRN
Qty: 10 TABLET | Refills: 0 | Status: SHIPPED | OUTPATIENT
Start: 2023-07-17 | End: 2023-07-24

## 2023-07-17 RX ORDER — KETOROLAC TROMETHAMINE 15 MG/ML
15 INJECTION, SOLUTION INTRAMUSCULAR; INTRAVENOUS ONCE
Status: COMPLETED | OUTPATIENT
Start: 2023-07-17 | End: 2023-07-17

## 2023-07-17 RX ORDER — SODIUM CHLORIDE 9 MG/ML
INJECTION, SOLUTION INTRAVENOUS CONTINUOUS
Status: DISCONTINUED | OUTPATIENT
Start: 2023-07-17 | End: 2023-07-17

## 2023-07-17 RX ORDER — HYDROCODONE BITARTRATE AND ACETAMINOPHEN 5; 325 MG/1; MG/1
1-2 TABLET ORAL EVERY 6 HOURS PRN
Qty: 10 TABLET | Refills: 0 | Status: SHIPPED | OUTPATIENT
Start: 2023-07-17 | End: 2023-07-22

## 2023-07-17 RX ORDER — HYDROCODONE BITARTRATE AND ACETAMINOPHEN 5; 325 MG/1; MG/1
1-2 TABLET ORAL EVERY 6 HOURS PRN
Qty: 10 TABLET | Refills: 0 | Status: SHIPPED | OUTPATIENT
Start: 2023-07-17 | End: 2023-07-17

## 2023-07-17 RX ORDER — TAMSULOSIN HYDROCHLORIDE 0.4 MG/1
0.4 CAPSULE ORAL DAILY
Qty: 7 CAPSULE | Refills: 0 | Status: SHIPPED | OUTPATIENT
Start: 2023-07-17 | End: 2023-07-24

## 2023-07-17 NOTE — DISCHARGE INSTRUCTIONS
Follow up with your urologist in the next week for reassessment. Take the medication as prescribed. Return for fevers above 100.4 F, vomiting, severe pain or any other concerning symptoms. The norco can make you drowsy so be careful when taking it.

## 2023-07-17 NOTE — ED INITIAL ASSESSMENT (HPI)
Woken up a couple times in the night by l sided flank pain- pain now constant- denies n/v/d-- hx of kidney stones-- denies urinary complaints

## 2023-07-25 ENCOUNTER — PATIENT MESSAGE (OUTPATIENT)
Dept: ENDOCRINOLOGY CLINIC | Facility: CLINIC | Age: 43
End: 2023-07-25

## 2023-07-25 DIAGNOSIS — E10.65 TYPE 1 DIABETES MELLITUS WITH HYPERGLYCEMIA (HCC): Primary | ICD-10-CM

## 2023-07-25 PROCEDURE — 95251 CONT GLUC MNTR ANALYSIS I&R: CPT | Performed by: NURSE PRACTITIONER

## 2023-07-25 NOTE — TELEPHONE ENCOUNTER
From: Anitra Gomes  To: CASSI Hillman  Sent: 7/25/2023 11:07 AM CDT  Subject: Glucose levels    Hi Patti Flower,    I went to the emergency room last Monday and found out I had kidney stones. So they prescribed Flomax so I can pass the stones. As a result, my sugar levels have been high for the past week. I believe my stones passed on Saturday, but should I alter my insulin dosage? Or should I see if they return to normal levels?     Mony Lara

## 2023-07-25 NOTE — TELEPHONE ENCOUNTER
-----------------------------  Dexcom Clarity  -----------------------------  Ramona Bonner  Date of Birth: [id number]  Generated at: [facility] [address], Jul 25, 2023 1:36 PM CDT  Reporting period:  Wed Jul 12, 2023 - Tue Jul 25, 2023  -----------------------------  Glucose Details  Average glucose: 191 mg/dL  Standard deviation: 69 mg/dL  GMI: 7.9%  -----------------------------  Time in Range  Very High: 19%  High: 35%  In Range: 44%  Low: 1%  Very Low: <1%    Target Range   mg/dL    -----------------------------  CGM Details  Sensor usage: 100%  Days with CGM data: 14/14    Current regimen:     Tresiba U 100 flex touch : 16 units daily   NOVOLOG: prefers set doses ; 8-14 units depending on meal size   Reminded about 3 hr active insulin   Corrections:  if blood sugar is:      180 - 220 dose 3 units   221-260, dose 4 units   261-300, dose 5 units   Over 300 , dose 6 units

## 2023-08-03 ENCOUNTER — TELEPHONE (OUTPATIENT)
Dept: ENDOCRINOLOGY CLINIC | Facility: CLINIC | Age: 43
End: 2023-08-03

## 2023-08-03 NOTE — TELEPHONE ENCOUNTER
Received fax from [employer] with PA forms/covermymed PA for dexcom. Need to be filled out and faxed with OV notes to [phone] and signed by CB.

## 2023-08-04 NOTE — TELEPHONE ENCOUNTER
Completed PA on cover my meds. Existing therapy. (Key: [id number])    Your information has been submitted to [facility]. To check for an updated outcome later, reopen this PA request from your dashboard. If Christal has not responded to your request within 24 hours, contact [facility] at [phone]. If you think there may be a problem with your PA request, use our live chat feature at the bottom right.

## 2023-08-04 NOTE — TELEPHONE ENCOUNTER
Received fax from [employer], pt has been approved for coverage of dexcom G6 sensor. Approved from 8/04/23 through 8/03/24. Messaged pt via [facility]. Sent to scan.

## 2023-08-07 ENCOUNTER — TELEPHONE (OUTPATIENT)
Dept: ENDOCRINOLOGY CLINIC | Facility: CLINIC | Age: 43
End: 2023-08-07

## 2023-08-07 RX ORDER — PROCHLORPERAZINE 25 MG/1
SUPPOSITORY RECTAL
Qty: 1 EACH | Refills: 3 | Status: SHIPPED | OUTPATIENT
Start: 2023-08-07

## 2023-08-07 NOTE — TELEPHONE ENCOUNTER
Pharmacy called stating they have script for G6 sensors, but not transmitter. Will send transmitter.

## 2023-08-12 ENCOUNTER — OFFICE VISIT (OUTPATIENT)
Dept: FAMILY MEDICINE CLINIC | Facility: CLINIC | Age: 43
End: 2023-08-12
Payer: COMMERCIAL

## 2023-08-12 VITALS
BODY MASS INDEX: 26.41 KG/M2 | TEMPERATURE: 98 F | HEART RATE: 71 BPM | RESPIRATION RATE: 14 BRPM | DIASTOLIC BLOOD PRESSURE: 82 MMHG | WEIGHT: 195 LBS | OXYGEN SATURATION: 99 % | HEIGHT: 72 IN | SYSTOLIC BLOOD PRESSURE: 122 MMHG

## 2023-08-12 DIAGNOSIS — Z13.6 SCREENING FOR HEART DISEASE: ICD-10-CM

## 2023-08-12 DIAGNOSIS — Z00.00 ROUTINE ADULT HEALTH MAINTENANCE: Primary | ICD-10-CM

## 2023-08-12 DIAGNOSIS — E10.65 TYPE 1 DIABETES MELLITUS WITH HYPERGLYCEMIA (HCC): ICD-10-CM

## 2023-08-12 DIAGNOSIS — R07.9 CHEST PAIN, UNSPECIFIED TYPE: ICD-10-CM

## 2023-08-12 PROCEDURE — 99214 OFFICE O/P EST MOD 30 MIN: CPT | Performed by: FAMILY MEDICINE

## 2023-08-12 PROCEDURE — 3008F BODY MASS INDEX DOCD: CPT | Performed by: FAMILY MEDICINE

## 2023-08-12 PROCEDURE — 99396 PREV VISIT EST AGE 40-64: CPT | Performed by: FAMILY MEDICINE

## 2023-08-12 PROCEDURE — 3074F SYST BP LT 130 MM HG: CPT | Performed by: FAMILY MEDICINE

## 2023-08-12 PROCEDURE — 3079F DIAST BP 80-89 MM HG: CPT | Performed by: FAMILY MEDICINE

## 2023-08-12 RX ORDER — CEPHALEXIN 500 MG/1
500 CAPSULE ORAL 2 TIMES DAILY
Qty: 14 CAPSULE | Refills: 0 | Status: SHIPPED | OUTPATIENT
Start: 2023-08-12 | End: 2023-08-19

## 2023-09-05 ENCOUNTER — TELEPHONE (OUTPATIENT)
Dept: ENDOCRINOLOGY CLINIC | Facility: CLINIC | Age: 43
End: 2023-09-05

## 2023-09-05 NOTE — TELEPHONE ENCOUNTER
Received fax for refill request through [facility] for patient already getting supplies through pharmacy faxed back to Cx order     Faxed [phone]

## 2023-09-08 NOTE — TELEPHONE ENCOUNTER
Pt called - he would like to start receiving dexcom supplies through [facility] again.  Re-printed fax that we received and prepped for CB to sign monday

## 2023-09-11 NOTE — TELEPHONE ENCOUNTER
CB signed and faxed with chart notes too [phone] sent copy to scan and placed other in brown folder

## 2023-09-14 ENCOUNTER — TELEPHONE (OUTPATIENT)
Dept: ENDOCRINOLOGY CLINIC | Facility: CLINIC | Age: 43
End: 2023-09-14

## 2023-09-19 ENCOUNTER — TELEPHONE (OUTPATIENT)
Dept: ENDOCRINOLOGY CLINIC | Facility: CLINIC | Age: 43
End: 2023-09-19

## 2023-09-19 DIAGNOSIS — E11.65 TYPE 2 DIABETES MELLITUS WITH HYPERGLYCEMIA (HCC): ICD-10-CM

## 2023-09-19 DIAGNOSIS — E10.65 TYPE 1 DIABETES MELLITUS WITH HYPERGLYCEMIA (HCC): Primary | ICD-10-CM

## 2023-09-19 RX ORDER — INSULIN ASPART 100 [IU]/ML
INJECTION, SOLUTION INTRAVENOUS; SUBCUTANEOUS
Qty: 45 ML | Refills: 1 | Status: SHIPPED | OUTPATIENT
Start: 2023-09-19

## 2023-09-19 NOTE — TELEPHONE ENCOUNTER
Requested Prescriptions     Pending Prescriptions Disp Refills    NOVOLOG FLEXPEN 100 UNIT/ML Subcutaneous Solution Pen-injector [Pharmacy Med Name: Neoma Jump 100 UNIT/ML FLEXPEN]  1     Sig: USING UP TO 50 UNITS DAILY AS DIRECTED IN DIVIDED DOSES AT MEALS     Your appointments       Date & Time Appointment Department [facility])    Nov 16, 2023  1:00 PM CST Video Visit with CASSI Hicks [facility], [address] [place] [address] ( [address])    Please verify your telehealth insurance benefits prior to your appointment. You must be in the state of [place] during the virtual visit. Please use the [employer] [address] and launch the video visit 10 minutes prior to your scheduled appointment time to ensure your camera and microphone are working properly. Once the video visit has started you will be placed in a waiting room until the provider begins the visit. You will receive an email confirmation with instructions. If you have questions, call your doctor's office directly. If you are having issues or need to use a desktop/laptop, please follow the below steps:        1. Close out all other open apps (could be competing for audio resources)  2. Disable Bluetooth  3.       Reboot mobile device before joining the video  4. Come off Wi-Fi and switch over to Data    Please see our Video Visit Tip Sheet if you need additional assistance. If you believe this is an emergency, please dial 911 immediately. [facility], [address] [place] [address]  [facility] [place] [address] [address] [phone]          Last A1c value was 8.5% done 6/26/2023.     Refill 3/20/23  LOV 6/29/23

## 2023-09-19 NOTE — TELEPHONE ENCOUNTER
Requested Prescriptions     Pending Prescriptions Disp Refills    METFORMIN 500 MG Oral Tab [Pharmacy Med Name: METFORMIN  MG TABLET] 180 tablet 1     Sig: TAKE 1 TABLET BY MOUTH TWICE A DAY     Your appointments       Date & Time Appointment Department [facility])    Nov 16, 2023  1:00 PM CST [facility] [address] [place] [address] ( [address])    Please verify your telehealth insurance benefits prior to your appointment. You must be in the state of [place] during the virtual visit. Please use the [employer] Mobile [address] and launch the video visit 10 minutes prior to your scheduled appointment time to ensure your camera and microphone are working properly. Once the video visit has started you will be placed in a waiting room until the provider begins the visit. You will receive an email confirmation with instructions. If you have questions, call your doctor's office directly. If you are having issues or need to use a desktop/laptop, please follow the below steps:        1. Close out all other open apps (could be competing for audio resources)  2. Disable Bluetooth  3.       Reboot mobile device before joining the video  4. Come off Wi-Fi and switch over to Data    Please see our Video Visit Tip Sheet if you need additional assistance. If you believe this is an emergency, please dial 911 immediately. [facility], [address] [place] [address] [address] DI[facility]ET[facility] [place]  [address] [address]  [phone]          Last A1c value was 8.5% done 6/26/2023.     Refill 3/21/23  LOV 6/29/23 telemed    eGFR-Cr  >=60 mL/min/1.73m2 112

## 2023-09-20 RX ORDER — ATORVASTATIN CALCIUM 20 MG/1
TABLET, FILM COATED ORAL
Qty: 90 TABLET | Refills: 0 | Status: SHIPPED | OUTPATIENT
Start: 2023-09-20

## 2023-09-20 NOTE — TELEPHONE ENCOUNTER
Name from pharmacy: ATORVASTATIN 20 MG TABLET         Will file in chart as: ATORVASTATIN 20 MG Oral Tab    Sig: TAKE 1 TABLET BY MOUTH EVERY DAY AT NIGHT    Disp: 90 tablet    Refills: 0 (Pharmacy requested: Not specified)    Start: 9/20/2023    Class: Normal    Last ordered: 3 months ago (6/22/2023) by Christa Oh MD    Last refill: 6/22/2023    Rx #: [id number]    Cholesterol Medication Protocol [id number]/20/2023 12:20 AM   Protocol Details ALT < 80    ALT resulted within past year    Lipid panel within past 12 months    Appointment within past 12 or next 3 months      To be filled at: [address] [facility], [address] [facility] [address] [facility], [phone], [phone] 4 = No assist / stand by assistance

## 2023-09-21 DIAGNOSIS — E10.65 TYPE 1 DIABETES MELLITUS WITH HYPERGLYCEMIA (HCC): Primary | ICD-10-CM

## 2023-09-21 RX ORDER — INSULIN DEGLUDEC INJECTION 100 U/ML
INJECTION, SOLUTION SUBCUTANEOUS
Qty: 30 ML | Refills: 1 | Status: SHIPPED | OUTPATIENT
Start: 2023-09-21

## 2023-09-21 NOTE — TELEPHONE ENCOUNTER
Requested Prescriptions     Pending Prescriptions Disp Refills    TRESIBA FLEXTOUCH 100 UNIT/ML Subcutaneous Solution Pen-injector [Pharmacy Med Name: Pro Deluca 100 UNIT/ML]  1     Sig: USES UP TO 30 UNITS DAILY AS DIRECTED     Future Appointments   Date Time Provider Cherie Alvarez   11/16/2023  1:00 PM CASSI Gillis [facility]     Last A1c value was 8.5% done 6/26/2023.   Refill 9/29/22  LOV 6/29/23

## 2023-09-22 ENCOUNTER — MOBILE ENCOUNTER (OUTPATIENT)
Dept: ENDOCRINOLOGY CLINIC | Facility: CLINIC | Age: 43
End: 2023-09-22

## 2023-09-22 ENCOUNTER — TELEPHONE (OUTPATIENT)
Dept: ENDOCRINOLOGY CLINIC | Facility: CLINIC | Age: 43
End: 2023-09-22

## 2023-09-22 ENCOUNTER — HOSPITAL ENCOUNTER (EMERGENCY)
Age: 43
Discharge: HOME | End: 2023-09-23
Attending: EMERGENCY MEDICINE
Payer: COMMERCIAL

## 2023-09-22 VITALS
BODY MASS INDEX: 26 KG/M2 | WEIGHT: 195.13 LBS | DIASTOLIC BLOOD PRESSURE: 67 MMHG | OXYGEN SATURATION: 97 % | HEART RATE: 80 BPM | RESPIRATION RATE: 20 BRPM | SYSTOLIC BLOOD PRESSURE: 115 MMHG | TEMPERATURE: 99 F

## 2023-09-22 DIAGNOSIS — B34.9 VIRAL SYNDROME: ICD-10-CM

## 2023-09-22 DIAGNOSIS — R73.9 HYPERGLYCEMIA: ICD-10-CM

## 2023-09-22 DIAGNOSIS — R11.2 NAUSEA AND VOMITING, UNSPECIFIED VOMITING TYPE: Primary | ICD-10-CM

## 2023-09-22 LAB
ALBUMIN SERPL-MCNC: 3.8 G/DL (ref 3.4–5)
ALBUMIN/GLOB SERPL: 1.1 {RATIO} (ref 1–2)
ALP LIVER SERPL-CCNC: 108 U/L
ALT SERPL-CCNC: 24 U/L
ANION GAP SERPL CALC-SCNC: 6 MMOL/L (ref 0–18)
AST SERPL-CCNC: 16 U/L (ref 15–37)
BASOPHILS # BLD AUTO: 0.02 X10(3) UL (ref 0–0.2)
BASOPHILS NFR BLD AUTO: 0.1 %
BILIRUB SERPL-MCNC: 0.9 MG/DL (ref 0.1–2)
BILIRUB UR QL CFM: NEGATIVE
BUN BLD-MCNC: 16 MG/DL (ref 7–18)
CALCIUM BLD-MCNC: 9.1 MG/DL (ref 8.5–10.1)
CHLORIDE SERPL-SCNC: 103 MMOL/L (ref 98–112)
CLARITY UR REFRACT.AUTO: CLEAR
CO2 SERPL-SCNC: 27 MMOL/L (ref 21–32)
COLOR UR AUTO: YELLOW
CREAT BLD-MCNC: 1.04 MG/DL
EGFRCR SERPLBLD CKD-EPI 2021: 93 ML/MIN/1.73M2 (ref 60–?)
EOSINOPHIL # BLD AUTO: 0.02 X10(3) UL (ref 0–0.7)
EOSINOPHIL NFR BLD AUTO: 0.1 %
ERYTHROCYTE [DISTWIDTH] IN BLOOD BY AUTOMATED COUNT: 12.5 %
GLOBULIN PLAS-MCNC: 3.5 G/DL (ref 2.8–4.4)
GLUCOSE BLD-MCNC: 256 MG/DL (ref 70–99)
GLUCOSE BLD-MCNC: 296 MG/DL (ref 70–99)
GLUCOSE UR STRIP.AUTO-MCNC: 500 MG/DL
HCT VFR BLD AUTO: 47.8 %
HGB BLD-MCNC: 16.2 G/DL
IMM GRANULOCYTES # BLD AUTO: 0.05 X10(3) UL (ref 0–1)
IMM GRANULOCYTES NFR BLD: 0.3 %
KETONES UR STRIP.AUTO-MCNC: >=160 MG/DL
LEUKOCYTE ESTERASE UR QL STRIP.AUTO: NEGATIVE
LYMPHOCYTES # BLD AUTO: 0.4 X10(3) UL (ref 1–4)
LYMPHOCYTES NFR BLD AUTO: 2.7 %
MCH RBC QN AUTO: 29.6 PG (ref 26–34)
MCHC RBC AUTO-ENTMCNC: 33.9 G/DL (ref 31–37)
MCV RBC AUTO: 87.4 FL
MONOCYTES # BLD AUTO: 0.38 X10(3) UL (ref 0.1–1)
MONOCYTES NFR BLD AUTO: 2.6 %
NEUTROPHILS # BLD AUTO: 13.86 X10 (3) UL (ref 1.5–7.7)
NEUTROPHILS # BLD AUTO: 13.86 X10(3) UL (ref 1.5–7.7)
NEUTROPHILS NFR BLD AUTO: 94.2 %
NITRITE UR QL STRIP.AUTO: NEGATIVE
OSMOLALITY SERPL CALC.SUM OF ELEC: 294 MOSM/KG (ref 275–295)
PH UR STRIP.AUTO: 5.5 [PH] (ref 5–8)
PLATELET # BLD AUTO: 222 10(3)UL (ref 150–450)
POTASSIUM SERPL-SCNC: 4.5 MMOL/L (ref 3.5–5.1)
PROT SERPL-MCNC: 7.3 G/DL (ref 6.4–8.2)
RBC # BLD AUTO: 5.47 X10(6)UL
RBC UR QL AUTO: NEGATIVE
SARS-COV-2 RNA RESP QL NAA+PROBE: NOT DETECTED
SODIUM SERPL-SCNC: 136 MMOL/L (ref 136–145)
SP GR UR STRIP.AUTO: 1.02 (ref 1–1.03)
UROBILINOGEN UR STRIP.AUTO-MCNC: 0.2 MG/DL
WBC # BLD AUTO: 14.7 X10(3) UL (ref 4–11)

## 2023-09-22 PROCEDURE — 96361 HYDRATE IV INFUSION ADD-ON: CPT

## 2023-09-22 PROCEDURE — 85025 COMPLETE CBC W/AUTO DIFF WBC: CPT | Performed by: EMERGENCY MEDICINE

## 2023-09-22 PROCEDURE — 99284 EMERGENCY DEPT VISIT MOD MDM: CPT

## 2023-09-22 PROCEDURE — 82962 GLUCOSE BLOOD TEST: CPT

## 2023-09-22 PROCEDURE — 80053 COMPREHEN METABOLIC PANEL: CPT | Performed by: EMERGENCY MEDICINE

## 2023-09-22 PROCEDURE — 81003 URINALYSIS AUTO W/O SCOPE: CPT

## 2023-09-22 PROCEDURE — 81003 URINALYSIS AUTO W/O SCOPE: CPT | Performed by: EMERGENCY MEDICINE

## 2023-09-22 PROCEDURE — 96374 THER/PROPH/DIAG INJ IV PUSH: CPT

## 2023-09-22 PROCEDURE — 96375 TX/PRO/DX INJ NEW DRUG ADDON: CPT

## 2023-09-22 RX ORDER — ONDANSETRON 4 MG/1
4 TABLET, ORALLY DISINTEGRATING ORAL EVERY 4 HOURS PRN
Qty: 10 TABLET | Refills: 0 | Status: SHIPPED | OUTPATIENT
Start: 2023-09-22 | End: 2023-09-29

## 2023-09-22 RX ORDER — ONDANSETRON 2 MG/ML
4 INJECTION INTRAMUSCULAR; INTRAVENOUS ONCE
Status: COMPLETED | OUTPATIENT
Start: 2023-09-22 | End: 2023-09-22

## 2023-09-22 RX ORDER — KETOROLAC TROMETHAMINE 15 MG/ML
15 INJECTION, SOLUTION INTRAMUSCULAR; INTRAVENOUS ONCE
Status: COMPLETED | OUTPATIENT
Start: 2023-09-22 | End: 2023-09-22

## 2023-09-22 NOTE — TELEPHONE ENCOUNTER
Pt is a type 1 so monitor for ketones in urine, drink plenty of fluids and report to ER if worsening sx.

## 2023-09-22 NOTE — TELEPHONE ENCOUNTER
Call with patient who reports his son has been sick and pt woke up this am and has been vomiting, not keeping fluids down. Diarrhea x 1. BG is 104 mg/dl. Pt not on GLP-1. Likely GI virus, not related to diabetes. Pt to contact PCP office if remains unable to keep fluids down.

## 2023-09-22 NOTE — TELEPHONE ENCOUNTER
Received request for script and last chart note from [facility] for CGM supplies. To provider for review on Monday.

## 2023-09-22 NOTE — TELEPHONE ENCOUNTER
Pt called - says he wonders if he has a stomach bug that he's been throwing up all morning. Says no recent changes to meds.  Told him oksana would call him back shortly

## 2023-09-23 NOTE — ED INITIAL ASSESSMENT (HPI)
Pt in er for c/o nausea, vomiting since this am,states his son was sick and caught something from him

## 2023-09-23 NOTE — PROGRESS NOTES
Patients wife called on call service stating patient continues to vomit and has not been able to keep any liquid down. Pt has ketones present and asking what he should do. Advised patient to go to ER for further eval and IV fluids. Wife verbalizes understanding and agreement.

## 2023-09-25 NOTE — TELEPHONE ENCOUNTER
CB signed and faxed with OV notes too [phone] sent copy to scan placed original in brown folder

## 2023-09-25 NOTE — PROGRESS NOTES
Pt did seek ER care   Reviewed notes; not in DKA   IV fluids given -   Will send pt my chart message

## 2023-10-02 ENCOUNTER — TELEPHONE (OUTPATIENT)
Dept: ENDOCRINOLOGY CLINIC | Facility: CLINIC | Age: 43
End: 2023-10-02

## 2023-10-02 RX ORDER — LANCETS
EACH MISCELLANEOUS
Qty: 1 EACH | Refills: 1 | Status: SHIPPED | OUTPATIENT
Start: 2023-10-02

## 2023-10-02 RX ORDER — BLOOD SUGAR DIAGNOSTIC
1 STRIP MISCELLANEOUS DAILY
Qty: 50 STRIP | Refills: 2 | Status: SHIPPED | OUTPATIENT
Start: 2023-10-02

## 2023-10-20 ENCOUNTER — HOSPITAL ENCOUNTER (OUTPATIENT)
Dept: CV DIAGNOSTICS | Age: 43
Discharge: HOME | End: 2023-10-20
Attending: FAMILY MEDICINE

## 2023-10-20 DIAGNOSIS — R07.9 CHEST PAIN, UNSPECIFIED TYPE: ICD-10-CM

## 2023-10-20 PROCEDURE — 93017 CV STRESS TEST TRACING ONLY: CPT | Performed by: FAMILY MEDICINE

## 2023-10-20 PROCEDURE — 93018 CV STRESS TEST I&R ONLY: CPT | Performed by: FAMILY MEDICINE

## 2023-11-11 LAB — HEMOGLOBIN A1C: 8.1 % OF TOTAL HGB

## 2023-11-20 ENCOUNTER — TELEMEDICINE (OUTPATIENT)
Dept: ENDOCRINOLOGY CLINIC | Facility: CLINIC | Age: 43
End: 2023-11-20
Payer: COMMERCIAL

## 2023-11-20 DIAGNOSIS — E10.65 TYPE 1 DIABETES MELLITUS WITH HYPERGLYCEMIA (HCC): Primary | ICD-10-CM

## 2023-11-20 NOTE — PROGRESS NOTES
Due to COVID-19 ACTION PLAN, the patient's office visit was converted to a video visit. Please note that the following visit was completed using two-way, real-time interactive audio and video communication. Time Spent: 21  min           Terence Becker is a 41year old presents today for type 1 diabetes management.    Primary care physician: Ariana Hare MD       Most recent  A1C 8.1% (Last A1C 8.5%)   A lot of stressors:    Kidney stone x 2      DKA sxs after stoamach flu- had to go to ER for IV fluids     Wearing dexcom CGM  G6 - not ready for change to Dexcom G7  \"Has heard there are issues w it\"  NOT interested in insulin pump     Daughter has type 1 DM and doses Tresiba in AM; he is interested in doing the same  Prefers set doses but eats mostly same things at meal time  Trends reviewed on dexcom : reviewed over 200s fro 6pm - 2 am  Admits will snack and not bolus         Dexcom download:  11.7.2023 through 11.      Coefficient of variation: 34.4%  Estimated A1C 8.5%    Average glucose 211 mg/dl (last download 216 mg/dl )     37%  In target range:(70-180mg/dl) : (last download  33 %)     32 % High glucose targets: (> 180mg/dl)   (last download: 34%)     29% Very high glucose targets:(> 250mg/dl)  (last download: 32 %)     1% Low glucose targets   (less than 70mg/dl)   (last download:<1 %)     <1% Very Low glucose targets: (< 54mg/dl ) :(last download: <1 %)      Findings:   Hypoglycemia rare( typically from higher bolus of novolog or with high activity )   Time in target: 37% ( ADA goal > 70%)       Diabetes History:  DM ~ 8-2018  Type 1 DM (+ EDDIE antibody)     Patient has not had hospitalizations for blood sugar issues and denies any history of pancreatitis        Previous DM therapies:  Ozempic: T1DM dx 6-2020     Current DM Regimen:  Metformin 500mg : 1 tab twice  daily   Tresiba flex touch : 18 units once daily in PM    Novolog flex pen: 10-13 units per meal 3 x daily-       HGBA1C: Lab Results   Component Value Date    A1C 8.1 (H) 11/10/2023    A1C 8.5 (H) 2023    A1C 7.8 (H) 2023     (H) 10/22/2021       Lab Results   Component Value Date    CHOLEST 158 2023    TRIG 111 2023    HDL 38 (L) 2023    LDL 99 2023    MICROALBCREA  2019      Comment:        Unable to calculate due to Urine Microalbumin <0.5 mg/dL      CREATSERUM 1.04 2023    EGFRCR 93 2023    AST 16 2023    ALT 24 2023     Lab Results   Component Value Date    TSH 1.540 2020    T4F 1.2 2020       Component      Latest Ref Rng & Units 2023   TSH      0.40 - 4.50 mIU/L 1.24     Component      Latest Ref Rng & Units 2023   MICROALBUMIN/CREATININE RATIO, RANDOM URINE      <30 mcg/mg creat 3       Component      Latest Ref Rng & Units 2020   C-PEPTIDE      0.80 - 3.85 ng/mL 0.21 (L)   GLUTAMIC ACID DECARBOXYLASE 65 AB      <5 IU/mL 184 (H)   ISLET CELL ANTIBODY SCREEN      NEGATIVE NEGATIVE     DM Complications:  Microvascular:   Neuropathy: no  Retinopathy: no  Nephropathy: no    Macrovascular:  PVD: no  CAD: no  Stroke/CVA: no    Modifying factors:  Medication adherence: yes  Wearing CGM, Dexcom   Recent steroids, illness or infections: no     Allergies: Patient has no known allergies.     Past Medical History:   Diagnosis Date    Calculus of kidney     High cholesterol     Hypothyroidism     Type 1 diabetes mellitus [facility])      Past Surgical History:   Procedure Laterality Date    OTHER SURGICAL HISTORY  2017    eswl Dr Lissette Samuel  2017    cysto, stent removal dr Serafin Gil Left 10/22/2021     Social History     Socioeconomic History    Marital status:    Tobacco Use    Smoking status: Former     Packs/day: 0.50     Years: 12.00     Additional pack years: 0.00     Total pack years: 6.00     Types: Cigarettes     Quit date: 6/15/2015     Years since quittin.4    Smokeless tobacco: Never   Vaping Use    Vaping Use: Never used   Substance and Sexual Activity    Alcohol use: No     Alcohol/week: 0.0 standard drinks of alcohol    Drug use: No   Other Topics Concern    Caffeine Concern Yes     Comment: tea daily    Exercise No    Seat Belt Yes     Family History   Problem Relation Age of Onset    Diabetes Mother     Other (pre diabetse) Mother     Diabetes Daughter 4        type 1      Current Medication List:   Current Outpatient Medications   Medication Sig Dispense Refill    Accu-Chek FastClix Lancets Does not apply Misc Test 3 x daily 1 each 1    Glucose Blood (ACCU-CHEK GUIDE) In Vitro Strip 1 each daily. 50 strip 2    Insulin Degludec (TRESIBA FLEXTOUCH) 100 UNIT/ML Subcutaneous Solution Pen-injector USES UP TO 30 UNITS DAILY AS DIRECTED 30 mL 1    ATORVASTATIN 20 MG Oral Tab TAKE 1 TABLET BY MOUTH EVERY DAY AT NIGHT 90 tablet 0    metFORMIN 500 MG Oral Tab TAKE 1 TABLET BY MOUTH TWICE A  tablet 0    insulin aspart (NOVOLOG FLEXPEN) 100 Units/mL Subcutaneous Solution Pen-injector Using up to 50 units daily as directed in divided doses at meals 45 mL 1    Continuous Blood Gluc Transmit (DEXCOM G6 TRANSMITTER) Does not apply Misc One transmitter every 90 days, use with Dexcom G6 sensor, E10.65 1 each 3    BD PEN NEEDLE MAURICE 2ND GEN 32G X 4 MM Does not apply Misc USE 5 TIMES DAILY 500 each 3    Continuous Blood Gluc Sensor (DEXCOM G6 SENSOR) Does not apply Misc 1 Device Every 10 days. 9 each 2    glucagon (GVOKE HYPOPEN 2-PACK) 1 MG/0.2ML Subcutaneous Solution Auto-injector Inject 1 mg into the skin as needed.  0.4 mL 1    Continuous Blood Gluc Transmit (DEXCOM G6 TRANSMITTER) Does not apply Misc Use as directed with Dexcom G 6 sensor 1 each 3    Blood Glucose Monitoring Suppl (ACCU-CHEK GUIDE) w/Device Does not apply Kit TEST ONCE DAILY AS DIRECTED       DM associated review of  symptoms:   Endocrine: Polyuria, polyphagia, polydipsia: no  Neurological: Paresthesias: no  HEENT: Blurred vision: no  Skin: no rash or wounds  Hematological: Hypoglycemia: rare, wearing dexcom     Review of Systems     LUNGS: denies shortness of breath   CARDIOVASCULAR: denies chest pain  GI: denies abdominal pain, nausea or diarrhea   PSYCHE: denies depression or anxiety      Physical exam:  There were no vitals taken for this visit. There is no height or weight on file to calculate BMI. Physical Exam   .  Constitutional: Normal appearance   Cardiovascular: Not assessed today    Pulmonary/Chest: Effort normal  Neurological: Alert and oriented . Psychiatric: Normal mood and affect. Assessment/Plan:    Dyslipidemia:   Last labs 2023 -   Continue atorvastatin rx. Type 1 diabetes mellitus with hyperglycemia  A1C: 8.1 % (last A1C 8.5%)     Diabetes control is still suboptimal   not well controlled   Again reminded importance of improving glycemic control for long term health benefits. NOT interested in  insulin pump with automated delivery technology w CGM integration -not interested in IN pEN   Reminded importance of  dosing NOVOLOG anytime he is eating carbs - even if withing 3 hour active insulin time  Reviewed that active insulin time is more for correction boluses -         If next A1C not less than 7.5% - will refer to endo     Continue    Metformin 500mg : 1 tab twice daily  Dexcom CGM G6- declined upgrade to G7     Increase Tresiba U 100 flex touch : 18 --> 20 units daily   Ok to hold tonight and dose in AM tomorrow     NOVOLOG:   ~ 1:10 IC ratio   ~ 30 ISF  3 hour active insulin time   BG target 120 mg/dl , correct above 180       GVOKE hypo kit - per pt not .         Continue site rotation      Reviewed clinical signifiance of A1c, adverse effects of suboptimal glucose control, and goals of therapy   Reminded pt on A1C and blood sugar targets (Fasting < 130 and post prandial <961 ) and complications associated with hyperglycemia and uncontrolled DM (on AVS)   Recommended SMBG 4- x daily if not wearing CGM   Reviewed s/s and treatment of hypoglycemia (on AVS)   Continue with lifestyle modifications since they have positive impact on diabetes/blood sugars/health (portion control, physical activity)    Reminded to get eye exam done -     The patient is asked to return in -3  m . Recommended to contact DM clinic sooner if questions or concerns. The patient indicates understanding of these issues and agrees to the plan. No orders of the defined types were placed in this encounter. DM quality  A1C/Blood pressure: as reported above   Nephropathy screenin NEG no indication for ace /arb rx. LIPID screenin2023 labs. atorvastatin rx. Last dilated eye exam: No data recorded Exam shows retinopathy? No data recorded  Last diabetic foot exam: No data recorded  Date of last PHQ-2 depression screen: No data recorded      This has been done in good feliciano to provide continuity of care in the best interest of the provider-patient relationship, due to the on-going public health crisis/national emergency and because of restrictions of visitation. There are limitations of this visit as no or only very limited physical exam could be performed. Every conscious effort was taken to allow for sufficient and adequate time. This billing visit was spent on reviewing blood sugar trends, DM related labs, medications and decision making. Appropriate medical decision-making and tests are ordered as detailed in the plan of care above. Vasyl Moreira understands phone or video evaluation is not a substitute for face-to-face examination or emergency care. Patient advised to go to ER or call 911 for worsening symptoms or acute distress.      CASSI Krause

## 2024-01-02 RX ORDER — BLOOD SUGAR DIAGNOSTIC
1 STRIP MISCELLANEOUS DAILY
Qty: 100 STRIP | Refills: 1 | Status: SHIPPED | OUTPATIENT
Start: 2024-01-02

## 2024-01-02 NOTE — TELEPHONE ENCOUNTER
Requested Prescriptions     Signed Prescriptions Disp Refills    ACCU-CHEK GUIDE In Vitro Strip 100 strip 1     Sig: EVERY DAY     Authorizing Provider: ALLYSSA WHITAKER     Ordering User: GERMANIA AVALOS     Future Appointments   Date Time Provider Department Center   3/14/2024  1:15 PM Allyssa Whitaker APRN [facility]

## 2024-01-05 ENCOUNTER — TELEPHONE (OUTPATIENT)
Dept: ENDOCRINOLOGY CLINIC | Facility: CLINIC | Age: 44
End: 2024-01-05

## 2024-01-05 NOTE — TELEPHONE ENCOUNTER
Pt called in stating he is traveling out of the country next week and requests travel letter for his insulin, etc.  Pt aware to get manual scan through security at airport for sensor.

## 2024-02-01 NOTE — TELEPHONE ENCOUNTER
----- Message from Vielka Raya NP sent at 1/25/2024  9:49 AM CST -----  No concerning or significant abnormalities noted on echocardiogram.      Temporary fill , 2 wks supply. Overdue for diabetic eye exam. Please get done and send report to me. Then I can refill med for longer duration.

## 2024-02-20 DIAGNOSIS — E10.65 TYPE 1 DIABETES MELLITUS WITH HYPERGLYCEMIA (HCC): Primary | ICD-10-CM

## 2024-02-20 DIAGNOSIS — E10.65 TYPE 1 DIABETES MELLITUS WITH HYPERGLYCEMIA (HCC): ICD-10-CM

## 2024-02-20 RX ORDER — INSULIN DEGLUDEC INJECTION 100 U/ML
INJECTION, SOLUTION SUBCUTANEOUS
Qty: 30 ML | Refills: 0 | Status: SHIPPED | OUTPATIENT
Start: 2024-02-20

## 2024-02-20 RX ORDER — INSULIN ASPART 100 [IU]/ML
INJECTION, SOLUTION INTRAVENOUS; SUBCUTANEOUS
Qty: 45 ML | Refills: 0 | Status: SHIPPED | OUTPATIENT
Start: 2024-02-20

## 2024-02-20 NOTE — TELEPHONE ENCOUNTER
Requested Prescriptions     Pending Prescriptions Disp Refills    NOVOLOG FLEXPEN 100 UNIT/ML Subcutaneous Solution Pen-injector [Pharmacy Med Name: NOVOLOG 100 UNIT/ML FLEXPEN]  1     Sig: USING UP TO 50 UNITS DAILY AS DIRECTED IN DIVIDED DOSES AT MEALS     Your appointments       Date & Time Appointment Department ([place])    Mar 14, 2024  1:45 PM CDT Video Visit with Allyssa Richter APRN [place] [employer] [facility], [address] [place] [address] ([facility] [place])    Please verify your telehealth insurance benefits prior to your appointment.    You must be in the [facility] during the virtual visit.     Please use the [employer] Mobile [address] and launch the video visit 10 minutes prior to your scheduled appointment time to ensure your camera and microphone are working properly. Once the video visit has started you will be placed in a waiting room until the provider begins the visit.     You will receive an email confirmation with instructions.  If you have questions, call your doctor's office directly.    If you are having issues or need to use a desktop/laptop, please follow the below steps:        1.       Close out all other open apps (could be competing for audio resources)  2.       Disable Bluetooth  3.       Reboot mobile device before joining the video  4.       Come off Wi-Fi and switch over to Data    Please see our Video Visit Tip Sheet if you need additional assistance.     If you believe this is an emergency, please dial 911 immediately.                [employer] [facility], [address] [place] [address]  [facility] [place]  [address] [place] [address] [id number] [phone]          Last A1c value was 8.1% done 11/10/2023.    Refill 9/19/23  LOV 11/20/23

## 2024-02-20 NOTE — TELEPHONE ENCOUNTER
Requested Prescriptions     Pending Prescriptions Disp Refills    TRESIBA FLEXTOUCH 100 UNIT/ML Subcutaneous Solution Pen-injector [Pharmacy Med Name: TRESIBA FLEXTOUCH 100 UNIT/ML]  1     Sig: USES UP TO 30 UNITS DAILY AS DIRECTED     Your appointments       Date & Time Appointment Department ([place])    Mar 14, 2024  1:45 PM CDT Video Visit with Allyssa Richter APRN [facility] [employer] [facility], [address] [place] [address] ([facility] DIABETES [place])    Please verify your telehealth insurance benefits prior to your appointment.    You must be in the [facility] during the virtual visit.     Please use the [employer] Mobile Lane and launch the video visit 10 minutes prior to your scheduled appointment time to ensure your camera and microphone are working properly. Once the video visit has started you will be placed in a waiting room until the provider begins the visit.     You will receive an email confirmation with instructions.  If you have questions, call your doctor's office directly.    If you are having issues or need to use a desktop/laptop, please follow the below steps:        1.       Close out all other open apps (could be competing for audio resources)  2.       Disable Bluetooth  3.       Reboot mobile device before joining the video  4.       Come off Wi-Fi and switch over to Data    Please see our Video Visit Tip Sheet if you need additional assistance.     If you believe this is an emergency, please dial 911 immediately.                [employer] [facility], [address] [place] [address]  [facility] DIABETES [place]  [address] [place] [address] [id number] [phone]          Last A1c value was 8.1% done 11/10/2023.    Refill 9/21/23  LOV 11/20/23

## 2024-02-20 NOTE — TELEPHONE ENCOUNTER
Sent pt message; needs DM labs   Orders Placed This Encounter    Comp Metabolic Panel (14)    Lipid Panel    Hemoglobin A1C    Microalb/Creat Ratio, Random Urine    TSH W Reflex To Free T4     Order Specific Question:   Release to patient     Answer:   Immediate    insulin aspart (NOVOLOG FLEXPEN) 100 Units/mL Subcutaneous Solution Pen-injector     Sig: USING UP TO 50 UNITS DAILY AS DIRECTED IN DIVIDED DOSES AT MEALS     Dispense:  45 mL     Refill:  0

## 2024-02-27 ENCOUNTER — PATIENT MESSAGE (OUTPATIENT)
Dept: ENDOCRINOLOGY CLINIC | Facility: CLINIC | Age: 44
End: 2024-02-27

## 2024-03-01 RX ORDER — PROCHLORPERAZINE 25 MG/1
1 SUPPOSITORY RECTAL
Qty: 9 EACH | Refills: 2 | Status: SHIPPED | OUTPATIENT
Start: 2024-03-01

## 2024-03-01 NOTE — TELEPHONE ENCOUNTER
Requested Prescriptions     Pending Prescriptions Disp Refills    DEXCOM G6 SENSOR Does not apply Misc [Pharmacy Med Name: DEXCOM G6 SENSOR]  2     Sig: USE 1 DEVICE EVERY 10 DAYS.     Future Appointments   Date Time Provider Department Center   3/14/2024  1:45 PM Allyssa Richter APRN [facility]     Last A1c value was 8.1% done 11/10/2023.  Refill 8/7/23  LOV 11/30823

## 2024-03-01 NOTE — TELEPHONE ENCOUNTER
From: Allyssa Richter  To: Terence SHAHAB Anton  Sent: 2/27/2024 11:00 AM CST  Subject: update labs needed for upcoming DM appt    Terence    You have an upcoming appointment with me and it is important to update the A1C level and other labs for your diabetes before this appointment.     This test does not require fasting and can be done any time of day   I have ordered this for [facility] lab location however they do REQUIRE appointments.   If you want it changed to [place] lab location, please let me know     Please have this test done at least 2 days prior to your telehealth appointment.   Also, if not wearing a continuous glucose sensor, be prepared to share recent blood sugar readings for the appointment to discuss your trends.       If you have any questions or concerns please let me know .       Sincerely,   Allyssa Richter NP  [facility]  [address] [place] [address]   [phone]

## 2024-03-12 ENCOUNTER — OFFICE VISIT (OUTPATIENT)
Dept: FAMILY MEDICINE CLINIC | Facility: CLINIC | Age: 44
End: 2024-03-12
Payer: COMMERCIAL

## 2024-03-12 VITALS
HEART RATE: 86 BPM | BODY MASS INDEX: 28.15 KG/M2 | TEMPERATURE: 98 F | OXYGEN SATURATION: 99 % | HEIGHT: 72 IN | WEIGHT: 207.81 LBS | DIASTOLIC BLOOD PRESSURE: 60 MMHG | SYSTOLIC BLOOD PRESSURE: 126 MMHG | RESPIRATION RATE: 18 BRPM

## 2024-03-12 DIAGNOSIS — R05.1 ACUTE COUGH: ICD-10-CM

## 2024-03-12 DIAGNOSIS — J02.9 SORE THROAT: Primary | ICD-10-CM

## 2024-03-12 DIAGNOSIS — J22 LOWER RESPIRATORY INFECTION: ICD-10-CM

## 2024-03-12 LAB
ALBUMIN/GLOBULIN RATIO: 1.4 (CALC) (ref 1–2.5)
ALBUMIN: 4.2 G/DL (ref 3.6–5.1)
ALKALINE PHOSPHATASE: 122 U/L (ref 36–130)
ALT: 19 U/L (ref 9–46)
AST: 21 U/L (ref 10–40)
BILIRUBIN, TOTAL: 0.4 MG/DL (ref 0.2–1.2)
BUN: 11 MG/DL (ref 7–25)
CALCIUM: 10.3 MG/DL (ref 8.6–10.3)
CARBON DIOXIDE: 31 MMOL/L (ref 20–32)
CHLORIDE: 101 MMOL/L (ref 98–110)
CHOL/HDLC RATIO: 4.2 (CALC)
CHOLESTEROL, TOTAL: 184 MG/DL
CONTROL LINE PRESENT WITH A CLEAR BACKGROUND (YES/NO): YES YES/NO
CREATININE, RANDOM URINE: 212 MG/DL (ref 20–320)
CREATININE: 0.9 MG/DL (ref 0.6–1.29)
EGFR: 110 ML/MIN/1.73M2
GLOBULIN: 3 G/DL (CALC) (ref 1.9–3.7)
GLUCOSE: 132 MG/DL (ref 65–99)
HDL CHOLESTEROL: 44 MG/DL
HEMOGLOBIN A1C: 8.9 % OF TOTAL HGB
KIT LOT #: 7592 NUMERIC
LDL-CHOLESTEROL: 116 MG/DL (CALC)
MICROALBUMIN/CREATININE RATIO, RANDOM URINE: 5 MCG/MG CREAT
MICROALBUMIN: 1 MG/DL
NON-HDL CHOLESTEROL: 140 MG/DL (CALC)
POTASSIUM: 4.7 MMOL/L (ref 3.5–5.3)
PROTEIN, TOTAL: 7.2 G/DL (ref 6.1–8.1)
SODIUM: 142 MMOL/L (ref 135–146)
STREP GRP A CUL-SCR: NEGATIVE
TRIGLYCERIDES: 125 MG/DL
TSH W/REFLEX TO FT4: 2.27 MIU/L (ref 0.4–4.5)

## 2024-03-12 PROCEDURE — 99213 OFFICE O/P EST LOW 20 MIN: CPT | Performed by: FAMILY MEDICINE

## 2024-03-12 PROCEDURE — 3074F SYST BP LT 130 MM HG: CPT | Performed by: FAMILY MEDICINE

## 2024-03-12 PROCEDURE — 87880 STREP A ASSAY W/OPTIC: CPT | Performed by: FAMILY MEDICINE

## 2024-03-12 PROCEDURE — 3008F BODY MASS INDEX DOCD: CPT | Performed by: FAMILY MEDICINE

## 2024-03-12 PROCEDURE — 3078F DIAST BP <80 MM HG: CPT | Performed by: FAMILY MEDICINE

## 2024-03-12 RX ORDER — AMOXICILLIN AND CLAVULANATE POTASSIUM 875; 125 MG/1; MG/1
1 TABLET, FILM COATED ORAL 2 TIMES DAILY
Qty: 20 TABLET | Refills: 0 | Status: SHIPPED | OUTPATIENT
Start: 2024-03-12 | End: 2024-03-22

## 2024-03-12 NOTE — PROGRESS NOTES
Subjective:   Terence Anton is a 43 year old male who presents for Sore Throat (Patient states has had a sore throat for the past 2 weeks and has swollen glands in neck. Patient has been taking dayquil and advil.) Two weeks ago he began to experience cold like symptoms with a mild sore throat. Over the last few days  he had one low grade fever and the pain has increased. It is worsened with swallowing. Denies sinus pressure or rhinorrhea. Endorses productive cough with yellow/green sputum. Has been taking OTC medications with minimal relief. His 2 year old child is also sick.       History/Other:    Chief Complaint Reviewed and Verified  Nursing Notes Reviewed and   Verified  Tobacco Reviewed  Allergies Reviewed  Medications Reviewed    Social History Reviewed         Tobacco:  He smoked tobacco in the past but quit greater than 12 months ago.  Social History    Tobacco Use      Smoking status: Former        Packs/day: 0.50        Years: 12.00        Additional pack years: 0.00        Total pack years: 6.00        Types: Cigarettes        Quit date: 6/15/2015        Years since quittin.7      Smokeless tobacco: Never       Current Outpatient Medications   Medication Sig Dispense Refill    amoxicillin clavulanate 875-125 MG Oral Tab Take 1 tablet by mouth 2 (two) times daily for 10 days. Take with food to minimize upset stomach. 20 tablet 0    Continuous Blood Gluc Sensor (DEXCOM G6 SENSOR) Does not apply Misc 1 each Every 10 days. 9 each 2    insulin degludec (TRESIBA FLEXTOUCH) 100 UNIT/ML Subcutaneous Solution Pen-injector Uses up to 30 units daily as directed 30 mL 0    insulin aspart (NOVOLOG FLEXPEN) 100 Units/mL Subcutaneous Solution Pen-injector USING UP TO 50 UNITS DAILY AS DIRECTED IN DIVIDED DOSES AT MEALS 45 mL 0    ACCU-CHEK GUIDE In Vitro Strip EVERY  strip 1    Accu-Chek FastClix Lancets Does not apply Misc Test 3 x daily 1 each 1    ATORVASTATIN 20 MG Oral Tab TAKE 1 TABLET BY MOUTH  EVERY DAY AT NIGHT 90 tablet 0    metFORMIN 500 MG Oral Tab TAKE 1 TABLET BY MOUTH TWICE A  tablet 0    Continuous Blood Gluc Transmit (DEXCOM G6 TRANSMITTER) Does not apply Misc One transmitter every 90 days, use with Dexcom G6 sensor, E10.65 1 each 3    BD PEN NEEDLE MAURICE 2ND GEN 32G X 4 MM Does not apply Misc USE 5 TIMES DAILY 500 each 3    glucagon (GVOKE HYPOPEN 2-PACK) 1 MG/0.2ML Subcutaneous Solution Auto-injector Inject 1 mg into the skin as needed. 0.4 mL 1    Blood Glucose Monitoring Suppl (ACCU-CHEK GUIDE) w/Device Does not apply Kit TEST ONCE DAILY AS DIRECTED           Review of Systems:  Review of Systems   Constitutional: Negative.  Negative for activity change, appetite change, chills, diaphoresis, fatigue, fever and unexpected weight change.   HENT:  Positive for sore throat. Negative for congestion, dental problem, ear pain, nosebleeds, postnasal drip, rhinorrhea, sinus pressure, sinus pain, sneezing, tinnitus and voice change.    Eyes:  Negative for photophobia, pain, discharge, redness, itching and visual disturbance.   Respiratory:  Positive for cough. Negative for apnea, chest tightness, shortness of breath, wheezing and stridor.    Cardiovascular:  Negative for chest pain, palpitations and leg swelling.   Gastrointestinal:  Negative for abdominal distention, abdominal pain, blood in stool, constipation, diarrhea, nausea and vomiting.   Endocrine: Negative for polydipsia, polyphagia and polyuria.   Genitourinary:  Negative for difficulty urinating, dysuria, enuresis, flank pain, frequency, hematuria and urgency.   Musculoskeletal:  Negative for back pain, joint swelling, myalgias, neck pain and neck stiffness.   Skin:  Negative for color change, pallor and rash.   Neurological:  Negative for dizziness, tremors, syncope, facial asymmetry, speech difficulty, weakness, light-headedness, numbness and headaches.   Hematological:  Does not bruise/bleed easily.   Psychiatric/Behavioral:   Negative for behavioral problems, confusion, hallucinations and sleep disturbance.          Objective:   /60 (BP Location: Left arm, Patient Position: Sitting, Cuff Size: adult)   Pulse 86   Temp 97.7 °F (36.5 °C) (Oral)   Resp 18   Ht 6' (1.829 m)   Wt 207 lb 12.8 oz (94.3 kg)   SpO2 99%   BMI 28.18 kg/m²  Estimated body mass index is 28.18 kg/m² as calculated from the following:    Height as of this encounter: 6' (1.829 m).    Weight as of this encounter: 207 lb 12.8 oz (94.3 kg).  Physical Exam  Constitutional:       Appearance: He is well-developed.   HENT:      Right Ear: Tympanic membrane normal.      Left Ear: Tympanic membrane normal.      Nose: No congestion or rhinorrhea.      Mouth/Throat:      Pharynx: Posterior oropharyngeal erythema present. No oropharyngeal exudate.   Eyes:      Conjunctiva/sclera: Conjunctivae normal.   Cardiovascular:      Rate and Rhythm: Normal rate and regular rhythm.      Heart sounds: Normal heart sounds.   Pulmonary:      Effort: Pulmonary effort is normal. No respiratory distress.      Breath sounds: Normal breath sounds. No wheezing or rhonchi.   Abdominal:      General: Bowel sounds are normal.      Palpations: Abdomen is soft.   Musculoskeletal:      Cervical back: Normal range of motion.   Lymphadenopathy:      Cervical: No cervical adenopathy.   Skin:     General: Skin is warm and dry.   Neurological:      Mental Status: He is alert and oriented to person, place, and time.      Deep Tendon Reflexes: Reflexes are normal and symmetric.           Assessment & Plan:   1. Sore throat (Primary)  -     Strep A Assay W/Optic  -     Amoxicillin-Pot Clavulanate; Take 1 tablet by mouth 2 (two) times daily for 10 days. Take with food to minimize upset stomach.  Dispense: 20 tablet; Refill: 0  2. Acute cough  3. Lower respiratory infection  -     Amoxicillin-Pot Clavulanate; Take 1 tablet by mouth 2 (two) times daily for 10 days. Take with food to minimize upset stomach.   Dispense: 20 tablet; Refill: 0  Recommended increased fluids/humidity  Steam inhalation for sinus pressure   OTC cold and flu medication as needed.   Robitussin or Robitussin DM as needed for cough  1 tsp honey for the cough prn    Tylenol as needed/Ibuprofen as needed, do not exceed 4000 mg of acetaminophen or 2400 mg of ibuprofen    Recheck if not improved in one week or sooner if worsening.        No follow-ups on file.    CASSI Wilkins, 3/12/2024, 10:31 AM

## 2024-03-14 ENCOUNTER — TELEMEDICINE (OUTPATIENT)
Dept: ENDOCRINOLOGY CLINIC | Facility: CLINIC | Age: 44
End: 2024-03-14
Payer: COMMERCIAL

## 2024-03-14 DIAGNOSIS — E78.5 DYSLIPIDEMIA: ICD-10-CM

## 2024-03-14 DIAGNOSIS — E10.65 TYPE 1 DIABETES MELLITUS WITH HYPERGLYCEMIA (HCC): Primary | ICD-10-CM

## 2024-03-14 PROCEDURE — 99214 OFFICE O/P EST MOD 30 MIN: CPT | Performed by: NURSE PRACTITIONER

## 2024-03-14 PROCEDURE — 95251 CONT GLUC MNTR ANALYSIS I&R: CPT | Performed by: NURSE PRACTITIONER

## 2024-03-14 NOTE — PROGRESS NOTES
Due to COVID-19 ACTION PLAN, the patient's office visit was converted to a video visit. Please note that the following visit was completed using two-way, real-time interactive audio and video communication.  Time Spent: 18   min           Terence Anton is a 43 year old presents today for type 1 diabetes management.   Primary care physician: Aly Johnson MD       Most recent  A1C 8.9% (Last A1C 8.1%)   Has been above 7.9% since    Some gaps in follow up   Resistant to med chagnes, new technology  Reports w on /off viral URI from his children through winter season     Feels the cold /cough meds are contributing to higher trends    Also admits does not dose novolog at time of eating carbs. He waits for dexcom arrow up and trends over 180s before dosing novolog    In past has declined upgrade to G7 , insulin pump, smart insulin pen devices     Wearing dexcom CGM  G6 - not ready for change to Dexcom G7  \"Has heard there are issues w it\"      Daughter has type 1 DM       Dexcom download:  2.29.2024 thru 3.14.2024      Coefficient of variation: 34.4%  Estimated A1C 8.5%    Average glucose 206 mg/dl (last download 211 mg/dl )     447%  In target range:(70-180mg/dl) : (last download  37 %)     26 % High glucose targets: (> 180mg/dl)   (last download: 32%)     29% Very high glucose targets:(> 250mg/dl)  (last download: 29 %)     1% Low glucose targets   (less than 70mg/dl)   (last download:1 %)     <1% Very Low glucose targets: (< 54mg/dl ) :(last download: <1 %)      Findings:   Hypoglycemia rare( typically from higher bolus of novolog or with high activity )   Time in target: 44% ( ADA goal > 70%)   Not correcting after 12 am ; will ride in > 200mg/dl over 4+ hours   Dosing after meals to avoid lows     Diabetes History:  DM ~ 8-2018  Type 1 DM (+ EDDIE antibody)     Patient has not had hospitalizations for blood sugar issues and denies any history of pancreatitis        Previous DM therapies:  Ozempic: T1DM dx  6-2020     Current DM Regimen:  Metformin 500mg : 1 tab twice  daily   Tresiba flex touch : 18 units once daily in PM    Novolog flex pen: 1:10 IC ratio ( dosing ~ 11-15 units at meals  Corrections: 1:30---> but recall of corrections more 1:22   BG target 120 mg/dl   Active insulin time 3 hrs        HGBA1C:    Lab Results   Component Value Date    A1C 8.9 (H) 03/11/2024    A1C 8.1 (H) 11/10/2023    A1C 8.5 (H) 06/26/2023     (H) 10/22/2021       Lab Results   Component Value Date    CHOLEST 184 03/11/2024    CHOLEST 158 02/27/2023    TRIG 125 03/11/2024    TRIG 111 02/27/2023    HDL 44 03/11/2024    HDL 38 (L) 02/27/2023     (H) 03/11/2024    LDL 99 02/27/2023     Lab Results   Component Value Date    MICROALBCREA  01/14/2019      Comment:        Unable to calculate due to Urine Microalbumin <0.5 mg/dL      MICROALBCREA 4.9 08/28/2018      Lab Results   Component Value Date    CREATSERUM 0.90 03/11/2024    CREATSERUM 1.04 09/22/2023    EGFRCR 110 03/11/2024    EGFRCR 93 09/22/2023     Lab Results   Component Value Date    AST 21 03/11/2024    AST 16 09/22/2023    ALT 19 03/11/2024    ALT 24 09/22/2023       Lab Results   Component Value Date    TSH 1.540 04/07/2020    TSH 0.622 05/14/2016    T4F 1.2 04/07/2020     Component      Latest Ref Rng 3/11/2024   TSH      0.40 - 4.50 mIU/L 2.27            Component      Latest Ref Rng & Units 2/27/2023   TSH      0.40 - 4.50 mIU/L 1.24     Component      Latest Ref Rng & Units 2/27/2023   MICROALBUMIN/CREATININE RATIO, RANDOM URINE      <30 mcg/mg creat 3       Component      Latest Ref Rng & Units 6/4/2020   C-PEPTIDE      0.80 - 3.85 ng/mL 0.21 (L)   GLUTAMIC ACID DECARBOXYLASE 65 AB      <5 IU/mL 184 (H)   ISLET CELL ANTIBODY SCREEN      NEGATIVE NEGATIVE     DM Complications:  Microvascular:   Neuropathy: no  Retinopathy: no  Nephropathy: no    Macrovascular:  PVD: no  CAD: no  Stroke/CVA: no    Modifying factors:  Medication adherence: mis timing of  prandial insulin . Dosing after meal/dexcom trends up      Recent steroids, illness or infections: (past 90 d) + viral URI; otc meds only no steroids     Allergies: Patient has no known allergies.    Past Medical History:   Diagnosis Date    Calculus of kidney     High cholesterol     Hypothyroidism     Type 1 diabetes mellitus (HCC)      Past Surgical History:   Procedure Laterality Date    OTHER SURGICAL HISTORY  2017    eswl Dr Wilson     OTHER SURGICAL HISTORY  2017    cysto, stent removal dr wilson     REMOVAL OF KIDNEY STONE Left 10/22/2021     Social History     Socioeconomic History    Marital status:    Tobacco Use    Smoking status: Former     Packs/day: 0.50     Years: 12.00     Additional pack years: 0.00     Total pack years: 6.00     Types: Cigarettes     Quit date: 6/15/2015     Years since quittin.7    Smokeless tobacco: Never   Vaping Use    Vaping Use: Never used   Substance and Sexual Activity    Alcohol use: No     Alcohol/week: 0.0 standard drinks of alcohol    Drug use: No   Other Topics Concern    Caffeine Concern Yes     Comment: tea daily    Exercise No    Seat Belt Yes     Family History   Problem Relation Age of Onset    Diabetes Mother     Other (pre diabetse) Mother     Diabetes Daughter 4        type 1      Current Medication List:   Current Outpatient Medications   Medication Sig Dispense Refill    amoxicillin clavulanate 875-125 MG Oral Tab Take 1 tablet by mouth 2 (two) times daily for 10 days. Take with food to minimize upset stomach. 20 tablet 0    Continuous Blood Gluc Sensor (DEXCOM G6 SENSOR) Does not apply Misc 1 each Every 10 days. 9 each 2    insulin degludec (TRESIBA FLEXTOUCH) 100 UNIT/ML Subcutaneous Solution Pen-injector Uses up to 30 units daily as directed 30 mL 0    insulin aspart (NOVOLOG FLEXPEN) 100 Units/mL Subcutaneous Solution Pen-injector USING UP TO 50 UNITS DAILY AS DIRECTED IN DIVIDED DOSES AT MEALS 45 mL 0    ACCU-CHEK GUIDE In Vitro  Strip EVERY  strip 1    Accu-Chek FastClix Lancets Does not apply Misc Test 3 x daily 1 each 1    ATORVASTATIN 20 MG Oral Tab TAKE 1 TABLET BY MOUTH EVERY DAY AT NIGHT 90 tablet 0    metFORMIN 500 MG Oral Tab TAKE 1 TABLET BY MOUTH TWICE A  tablet 0    Continuous Blood Gluc Transmit (DEXCOM G6 TRANSMITTER) Does not apply Misc One transmitter every 90 days, use with Dexcom G6 sensor, E10.65 1 each 3    BD PEN NEEDLE MAURICE 2ND GEN 32G X 4 MM Does not apply Misc USE 5 TIMES DAILY 500 each 3    glucagon (GVOKE HYPOPEN 2-PACK) 1 MG/0.2ML Subcutaneous Solution Auto-injector Inject 1 mg into the skin as needed. 0.4 mL 1    Blood Glucose Monitoring Suppl (ACCU-CHEK GUIDE) w/Device Does not apply Kit TEST ONCE DAILY AS DIRECTED       DM associated review of  symptoms:   Endocrine: Polyuria, polyphagia, polydipsia: no  Neurological: Paresthesias: no  HEENT: Blurred vision: no  Skin: no rash or wounds  Hematological: Hypoglycemia: rare, wearing dexcom     Review of Systems     LUNGS: denies shortness of breath   CARDIOVASCULAR: denies chest pain  GI: denies abdominal pain, nausea or diarrhea       Physical exam:  There were no vitals taken for this visit.  There is no height or weight on file to calculate BMI.    Physical Exam   .  Constitutional: Normal appearance   Cardiovascular: Not assessed today    Pulmonary/Chest: Effort normal  Neurological: Alert and oriented .   Psychiatric: Normal mood and affect.         Assessment/Plan:    Dyslipidemia:   Last labs  3-2024   Continue atorvastatin rx.      Type 1 diabetes mellitus with hyperglycemia  A1C: 8.9 % (last A1C 8.1%)     Diabetes control is increased     Again reminded importance of improving glycemic control for long term health benefits.   Declines change in technology - therapy   In past has NOT interested in  insulin pump with automated delivery technology w CGM integration -or smart insulin pen devices. (IN pEN )   Reviewed dosing novolog at time of meal  when eating carbs   Concerned about hypoglycemia before \"food can digest) ; recommended 50% bolus at meal start and 50% after meal completion to avoid chasing after hyperglycemia  Also discussed ISF 22 may be too aggressive  Resume 1;30 w BG target 120 mg/dl  Could consider less aggressive ISF from 12mN - 6 am since he is not correcting at this time      Recommended endocrinology consult since A1C not improving      Continue    Metformin 500mg : 1 tab twice daily  Dexcom CGM G6-  (declines upgrade to G7 )    Tresiba U 100 flex touch : 20 units daily       NOVOLOG:   ~ 1:10 IC ratio   ~ 30 ISF  3 hour active insulin time   BG target 120 mg/dl , correct above 180       GVOKE hypo kit - per pt not .        Continue site rotation      Reviewed clinical signifiance of A1c, adverse effects of suboptimal glucose control, and goals of therapy   Reminded pt on A1C and blood sugar targets (Fasting < 130 and post prandial <180 ) and complications associated with hyperglycemia and uncontrolled DM (on AVS)   Recommended SMBG 4- x daily if not wearing CGM   Reviewed s/s and treatment of hypoglycemia (on AVS)   Continue with lifestyle modifications since they have positive impact on diabetes/blood sugars/health (portion control, physical activity)    Reminded to get eye exam done -     Referred to endorcrinology - will resume care for diabetes until care is established w endo  . Recommended to contact DM clinic sooner if questions or concerns.    The patient indicates understanding of these issues and agrees to the plan.    No orders of the defined types were placed in this encounter.    DM quality  A1C/Blood pressure: as reported above   Nephropathy screening: 3.2024 NEG no indication for ace /arb rx.   LIPID screening: 3.2024 labs.  atorvastatin rx.   Last dilated eye exam: No data recorded Exam shows retinopathy? No data recorded  Last diabetic foot exam: No data recorded        This has been done in good feliciano to  provide continuity of care in the best interest of the provider-patient relationship, due to the on-going public health crisis/national emergency and because of restrictions of visitation.  There are limitations of this visit as no or only very limited physical exam could be performed.  Every conscious effort was taken to allow for sufficient and adequate time.  This billing visit was spent on reviewing blood sugar trends, DM related labs, medications and decision making.  Appropriate medical decision-making and tests are ordered as detailed in the plan of care above.      Terence SHAHAB Anton understands phone or video evaluation is not a substitute for face-to-face examination or emergency care. Patient advised to go to ER or call 911 for worsening symptoms or acute distress.     Allyssa Richter, APRN

## 2024-03-14 NOTE — PATIENT INSTRUCTIONS
A1C 8.9%   You have been above 7.9% since 2022 so I want you to see endocrinology for 2nd opinion /consult    [facility]:   Recommended Providers:     Dr Ally Phoenix     [address]  [address]  [place]    [phone]    Continue        Metformin 500mg : 1 tab twice daily  Dexcom CGM G6    Tresiba U 100 flex touch : 20 units daily         NOVOLOG:    If you are not comfortable dosing for full carbs at meal if blood sugar under 100mg/dl dose 50% of the dose at meal start ; dose remaining at end of meal or when dexcom trends are upward        ~ 1:10 IC ratio   ~ 30 ISF  3 hour active insulin time   BG target 120 mg/dl , correct above 180      American Diabetes Association: blood sugar targets:     Fasting blood sugar (before breakfast) Target:    (ideally less than 110)  2 hours after eating less than 180 (ideally less than  150 )     Call for blood sugars less than  75 or greater than  200 more than 2 times in a week     If you are wearing the sensor, please look at your trends/averages    Recommendations:   GMI (estimated A1C ) target under  7%     Time in range (healthy blood sugar targets) : goal is over 70%   less than 70 : goal is less than 4%   Over 180 : goal is less than 20 %   Over 250: goal is  less than 5%       Watch for low blood sugars: (less than 70 )    Treatment of Low Blood Glucose Action Plan  1. Check blood glucose to be sure that it is low. You cannot  always go by symptoms or how you feel. If in doubt, treat your low blood glucose anyway.  Rule of 15 :     2. Take 15 grams of carbohydrate (carb). Here are some choices:    4 oz. regular fruit juice  3-4 glucose tablets  6 oz. regular soda   7-8 jelly beans    3. Recheck blood glucose after 10-15 minutes. If blood glucose is still low (less than 70 mg/dl) repeat the treatment (step 2).    4. If your next meal is more than one hour away, eat a small snack.    5. If you’re not sure what caused your  low blood glucose, call your healthcare provider.    6. Always check your blood glucose before you drive

## 2024-04-22 ENCOUNTER — OFFICE VISIT (OUTPATIENT)
Facility: CLINIC | Age: 44
End: 2024-04-22
Payer: COMMERCIAL

## 2024-04-22 VITALS
BODY MASS INDEX: 27.77 KG/M2 | WEIGHT: 205 LBS | SYSTOLIC BLOOD PRESSURE: 118 MMHG | HEIGHT: 72 IN | OXYGEN SATURATION: 99 % | HEART RATE: 81 BPM | DIASTOLIC BLOOD PRESSURE: 72 MMHG

## 2024-04-22 DIAGNOSIS — E78.00 HIGH CHOLESTEROL: ICD-10-CM

## 2024-04-22 DIAGNOSIS — E10.65 TYPE 1 DIABETES MELLITUS WITH HYPERGLYCEMIA (HCC): Primary | ICD-10-CM

## 2024-04-22 PROCEDURE — 3052F HG A1C>EQUAL 8.0%<EQUAL 9.0%: CPT | Performed by: STUDENT IN AN ORGANIZED HEALTH CARE EDUCATION/TRAINING PROGRAM

## 2024-04-22 PROCEDURE — 95249 CONT GLUC MNTR PT PROV EQP: CPT | Performed by: STUDENT IN AN ORGANIZED HEALTH CARE EDUCATION/TRAINING PROGRAM

## 2024-04-22 PROCEDURE — 3061F NEG MICROALBUMINURIA REV: CPT | Performed by: STUDENT IN AN ORGANIZED HEALTH CARE EDUCATION/TRAINING PROGRAM

## 2024-04-22 PROCEDURE — 3074F SYST BP LT 130 MM HG: CPT | Performed by: STUDENT IN AN ORGANIZED HEALTH CARE EDUCATION/TRAINING PROGRAM

## 2024-04-22 PROCEDURE — 3078F DIAST BP <80 MM HG: CPT | Performed by: STUDENT IN AN ORGANIZED HEALTH CARE EDUCATION/TRAINING PROGRAM

## 2024-04-22 PROCEDURE — 3008F BODY MASS INDEX DOCD: CPT | Performed by: STUDENT IN AN ORGANIZED HEALTH CARE EDUCATION/TRAINING PROGRAM

## 2024-04-22 PROCEDURE — 99205 OFFICE O/P NEW HI 60 MIN: CPT | Performed by: STUDENT IN AN ORGANIZED HEALTH CARE EDUCATION/TRAINING PROGRAM

## 2024-04-22 NOTE — PROGRESS NOTES
ENDOCRINOLOGY, DIABETES & METABOLISM    Name: Terence Anton  MR: [id number]  Date: April 22, 2024   Referring Physician: No ref. provider found    Subjective    HISTORY OF PRESENT ILLNESS:  Terence Anton is a 43 year oldM  seen in consultation for his Type 1 Diabetes.  Diabetes was diagnosed around 2018 but initiallyw as told it was T2DM. Around 2020, diagnosed with T1DM and started on insulin. A1c is 8.9 3/2024    Diabetes Treatment history   Duration of therapy with insulin: since 2020.    Current Regimen for diabetes:   Oral/Injectable medications: Metformin 500 mg daily   Basal:  Tresiba 20 units at bedtime . Injects in arms/abdomen  Prandial:  Novolog 10-15 units during meal . Injects in arms/abdomen  Gives additional 3-8 units (during meals and bedtime)  Missed doses: denies    Complications  Neuropathy: No Follows with neurology: No    Nephropathy: No Last MCR: 5 3/2024 Follows with nephrology: No   Retinopathy: No Last Ophthalmology Visit: 2023      CAD/ HLD: Yes:   On Statin: No; inconsistent with statin (atorvastatin 20 mg)    Hypertension: No On ACEi: No    Stroke/TIA: No On Aspirin: No    Skin Ulcer/Infection: No     Follows with Podiatry: No Last foot exam: Foot/Ankle Musculoskeletal Exam   due in 2024               ALLERGIES, PAST MEDICAL HISTORY, SOCIAL HISTORY, FAMILY HISTORY reviewed and updated in Epic as appropriate April 22, 2024    CURRENT MEDICATIONS:    Continuous Blood Gluc Sensor (DEXCOM G6 SENSOR) Does not apply Misc 1 each Every 10 days. 9 each 2    insulin degludec (TRESIBA FLEXTOUCH) 100 UNIT/ML Subcutaneous Solution Pen-injector Uses up to 30 units daily as directed 30 mL 0    insulin aspart (NOVOLOG FLEXPEN) 100 Units/mL Subcutaneous Solution Pen-injector USING UP TO 50 UNITS DAILY AS DIRECTED IN DIVIDED DOSES AT MEALS 45 mL 0    ACCU-CHEK GUIDE In Vitro Strip EVERY  strip 1    Accu-Chek FastClix Lancets Does not apply Misc Test 3 x daily 1 each 1    ATORVASTATIN 20 MG Oral Tab  TAKE 1 TABLET BY MOUTH EVERY DAY AT NIGHT 90 tablet 0    metFORMIN 500 MG Oral Tab TAKE 1 TABLET BY MOUTH TWICE A  tablet 0    Continuous Blood Gluc Transmit (DEXCOM G6 TRANSMITTER) Does not apply Misc One transmitter every 90 days, use with Dexcom G6 sensor, E10.65 1 each 3    BD PEN NEEDLE MAURICE 2ND GEN 32G X 4 MM Does not apply Misc USE 5 TIMES DAILY 500 each 3    glucagon (GVOKE HYPOPEN 2-PACK) 1 MG/0.2ML Subcutaneous Solution Auto-injector Inject 1 mg into the skin as needed. 0.4 mL 1    Blood Glucose Monitoring Suppl (ACCU-CHEK GUIDE) w/Device Does not apply Kit TEST ONCE DAILY AS DIRECTED           REVIEW OF SYSTEMS: Pertinent positives documented above in HPIObjective      PHYSICAL EXAMINATION  Wt Readings from Last 3 Encounters:   04/22/24 205 lb (93 kg)   03/12/24 207 lb 12.8 oz (94.3 kg)   09/22/23 195 lb 1.7 oz (88.5 kg)      BMI Readings from Last 3 Encounters:   04/22/24 27.80 kg/m²   03/12/24 28.18 kg/m²   09/22/23 26.46 kg/m²     Vital Signs:   Vitals:    04/22/24 1233   BP: 118/72   Pulse: 81   SpO2: 99%   Weight: 205 lb (93 kg)   Height: 6' (1.829 m)     Body mass index is 27.8 kg/m².   General Appearance:  Alert, in no acute distress, well developed  Eyes:  normal conjunctivae, sclera  Ears/Nose/Mouth/Throat/Neck:  normal hearing, normal speech and no palpable thyroid nodules  Respiratory:  breathing comfortably on room air, clear to auscultation bilaterally  Cardiovascular:  regular rate and rhythm, no murmurs, S3 or S4, no peripheral edema  Psychiatric:  Oriented to person, place and time, appropriate mood & affect  Skin: Normal moisture and skin texture  Neuro: sensory grossly intact, motor grossly intact. normal gait.    Recent Labs: Independently reviewed labs on April 22, 2024 in [facility] under lab tab.  Interpretation: A1c above goal  Diabetic Labs:   Last A1c value was 8.9% done 3/11/2024.  3/2024 TSH WNL, malb 5,     Continuous Glucose Monitoring Interpretation  Terence Anton has  undergone continuous glucose monitoring with the Dexcom G6 CGM with phone (connected to clinic profile).  The blood glucose tracings were evaluated for two weeks prior to office visit.    Blood glucose tracings demonstrated areas of  hyperglycemia from 2P to 3A.  There were rare areas of hypoglycemia during the weeks of evaluation.      At goal () 46% of the time.  High 26% of the time.  Very high 27% of the time.  Low <1% of the time.  Very low <1% of the time.    Frequency of hypoglycemia: rare; notes when he takes insulin prior to meals he does drop       Radiology:  Independently interpreted testing on April 22, 2024    I reviewed the patient's records from outside facility using [facility]. Pertinent imaging reviewed.   7/17/2023 CT abdomen pelvis with normal adrenals and left nephrolithiasis.  Unremarkable pancreas    ASSESSMENT & PLAN  Terence Anton is a 43 year old M seen in consultation for:    Uncontrolled Type 1 Diabetes   Importance of better glucose control in preventing onset of end-organ damage discussed, as well as, goals of therapy and clinical significance of A1c   We reviewed his Dexcom data and his current insulin regimen.  Patient has a pattern of hyperglycemia with meals and variable hypoglycemia.  He currently takes his insulin midway through his meal.  Patient to decrease NovoLog to 10 units which is to be given 10 to 15 minutes prior to each meal to assist with compliance of Premeal insulin  Continue Tresiba 20 units at bedtime  Patient notes he does snack around bedtime and he does not correct for this.  We discussed starting 4 units of NovoLog to be taken prior to any snacks  Patient to change his correction to 1 unit of NovoLog for every 20 points above 140  He is to check in with me via [employer] in 2 weeks and we can make further changes at that time  He is to send me records from his last ophthalmology visit  Surveillance for Complications & Risks  oHgA1c (goal <7%):  No  oRenal: microalbumin: Negative, 3/2024  oBlood Pressure (goal <130/80):  yes  oOptho: Eye screening (yearly): yes, last in 2023, due this year.   oNeuro: Encouraged proper footwear and regular visits with podiatry. Foot exam (yearly): No, due in 2024  oCV: hx of HLD ->  (goal: LDL<100),  on 3/12/2024        Hyperlipidemia  Lipid panel 3/12/2024 , triglycerides 125   Patient prescribed atorvastatin 20 mg daily however is not taking this medication   Discussed restarting atorvastatin 20 mg daily and repeating lipid panel in 6 months around September 2024     Nutrition:   Vitamin B12, Vitamin D3 as needed  Recommend chewable multivitamin daily.   Advised whole food plant based diet      Interventions Discussed  Eat breakfast Yes  Eat 3 meals per day Yes   Drink 64oz of water per day Yes   Goals: Promote healthy eating, routine exercise/activity.   1. Patient aware of the adverse effects of suboptimal glucose control and goals of therapy  2. Reinforced timing and compliance with medication, SMBG and routine follow up       The above assessment and plan was discussed with the patient. The patient noted understanding and agreement with the plan listed above. The patient is aware to contact the office if further concerns arise.  I have reviewed prior external note(s) from each unique source (PCP and other specialists), reviewed results/ordered further testing.      Visit Duration : A total of 60 minutes was spent today on obtaining history, reviewing pertinent labs and documentation by other providers, evaluating patient, providing multiple treatment options, reviewing CGM, reinforcing diet/exercise and compliance, and completing documentation.     Note to patient: The 21 Century Cures Act makes medical notes like these available to patients in the interest of transparency. However, be advised this is a medical document. It is intended as peer to peer communication. It is written in medical language  and may contain abbreviations or verbiage that are unfamiliar. It may appear blunt or direct. Medical documents are intended to carry relevant information, facts as evident, and the clinical opinion of the practitioner.    Ally York DO  Endocrinology, Diabetes & Metabolism   4/22/2024

## 2024-04-22 NOTE — PATIENT INSTRUCTIONS
Return Visit   [ x ] Physician in 2 months   [  ] In person or video visit  [  ] In person only    [ x ] After visit summary   [ ] Placed labs/imaging. Labs are to be drawn at 8A and fasting.     [  ] Diabetes Education:    [ ] Carb Aware T2DM (60)   [ ] Carb count refresh T1DM (60)   [ ] CGM start _____________ (30)   [ ] Pump 101 (60)   [ ] Schedule with [facility] for ___________ (60)   [ ] DMBO (60)    It was great seeing you today!    Today we discussed your diabetes:  -Please decrease your mealtime novolog to 10 units prior to each meal (10-15 minutes prior)  -Continue tresiba 20 units at bedtime  -Start 4 units of novolog to be taken prior to any snacks  -Change your correction to 1 unit of novolog for every 20 points about 140   -Please check in with me in 2 weeks and we can make more changes  -Send me records from your eye doctor  -I will do a foot exam at your follow up visit in 2 months  -Labs are due 3/2025    Take care!  -Dr. York

## 2024-05-10 DIAGNOSIS — E10.65 TYPE 1 DIABETES MELLITUS WITH HYPERGLYCEMIA (HCC): ICD-10-CM

## 2024-05-10 RX ORDER — ATORVASTATIN CALCIUM 20 MG/1
TABLET, FILM COATED ORAL
Qty: 90 TABLET | Refills: 3 | Status: SHIPPED | OUTPATIENT
Start: 2024-05-10

## 2024-05-10 RX ORDER — INSULIN ASPART 100 [IU]/ML
INJECTION, SOLUTION INTRAVENOUS; SUBCUTANEOUS
Refills: 0 | OUTPATIENT
Start: 2024-05-10

## 2024-05-10 RX ORDER — INSULIN DEGLUDEC 100 U/ML
INJECTION, SOLUTION SUBCUTANEOUS
Refills: 0 | OUTPATIENT
Start: 2024-05-10

## 2024-05-13 DIAGNOSIS — E10.65 TYPE 1 DIABETES MELLITUS WITH HYPERGLYCEMIA (HCC): ICD-10-CM

## 2024-05-13 RX ORDER — INSULIN ASPART 100 [IU]/ML
INJECTION, SOLUTION INTRAVENOUS; SUBCUTANEOUS
Qty: 45 ML | Refills: 0 | Status: SHIPPED | OUTPATIENT
Start: 2024-05-13

## 2024-05-13 RX ORDER — INSULIN DEGLUDEC 100 U/ML
INJECTION, SOLUTION SUBCUTANEOUS
Qty: 30 ML | Refills: 0 | Status: SHIPPED | OUTPATIENT
Start: 2024-05-13

## 2024-05-29 DIAGNOSIS — E11.65 TYPE 2 DIABETES MELLITUS WITH HYPERGLYCEMIA (HCC): ICD-10-CM

## 2024-05-29 NOTE — TELEPHONE ENCOUNTER
Requested Prescriptions     Pending Prescriptions Disp Refills    METFORMIN 500 MG Oral Tab [Pharmacy Med Name: METFORMIN  MG TABLET] 180 tablet 0     Sig: TAKE 1 TABLET BY MOUTH TWICE A DAY     Future Appointments   Date Time Provider Department Center   6/17/2024  3:00 PM Ally York DO [id number]   8/14/2024  9:00 AM Aly Johnson MD [facility] [address]     Last A1c value was 8.9% done 3/11/2024.  Refill 9/19/24  LOV 3/14/24

## 2024-06-24 DIAGNOSIS — E10.65 TYPE 1 DIABETES MELLITUS WITH HYPERGLYCEMIA (HCC): ICD-10-CM

## 2024-06-24 RX ORDER — INSULIN ASPART 100 [IU]/ML
INJECTION, SOLUTION INTRAVENOUS; SUBCUTANEOUS
Qty: 45 ML | Refills: 0 | Status: CANCELLED | OUTPATIENT
Start: 2024-06-24

## 2024-06-24 NOTE — TELEPHONE ENCOUNTER
Requesting 90 day supply  LOV: 4/22/24     Next office visit: 6/28/24     Last filled: 5/13/24  Refill request: 90 day supply Tresiba      Order pended and routed to provider

## 2024-06-25 NOTE — TELEPHONE ENCOUNTER
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    PostOp Diagnosis: Same.    Procedure:  Dialysis fistulogram and venoplasty.    Surgeon:  Nate Syed MD    Type of Anesthesia:  IV sedation foe 30 minutes and local with 4ml 1% Lidocaine.    IR Staff:    Specimens removed if any:  None.    Estimated Blood Loss: 10ml.    Contrast: 20ml Visipaque.    Findings: Severe stenosis above anastomosis, treated with venoplasty.    Complications: None.    Dictated, #[id number].        11/7/2019 5:47 PM Nate Syed M.D.       Patient confirmed he does not need a refill before his follow up on 6/28.    Closing Encounter.

## 2024-06-28 ENCOUNTER — OFFICE VISIT (OUTPATIENT)
Facility: CLINIC | Age: 44
End: 2024-06-28

## 2024-06-28 VITALS — DIASTOLIC BLOOD PRESSURE: 80 MMHG | OXYGEN SATURATION: 99 % | SYSTOLIC BLOOD PRESSURE: 132 MMHG | HEART RATE: 102 BPM

## 2024-06-28 DIAGNOSIS — E78.00 HIGH CHOLESTEROL: ICD-10-CM

## 2024-06-28 DIAGNOSIS — E10.65 TYPE 1 DIABETES MELLITUS WITH HYPERGLYCEMIA (HCC): Primary | ICD-10-CM

## 2024-06-28 LAB — HEMOGLOBIN A1C: 8.4 % (ref 4.3–5.6)

## 2024-06-28 PROCEDURE — 3052F HG A1C>EQUAL 8.0%<EQUAL 9.0%: CPT | Performed by: STUDENT IN AN ORGANIZED HEALTH CARE EDUCATION/TRAINING PROGRAM

## 2024-06-28 PROCEDURE — 83036 HEMOGLOBIN GLYCOSYLATED A1C: CPT | Performed by: STUDENT IN AN ORGANIZED HEALTH CARE EDUCATION/TRAINING PROGRAM

## 2024-06-28 PROCEDURE — 3079F DIAST BP 80-89 MM HG: CPT | Performed by: STUDENT IN AN ORGANIZED HEALTH CARE EDUCATION/TRAINING PROGRAM

## 2024-06-28 PROCEDURE — 3075F SYST BP GE 130 - 139MM HG: CPT | Performed by: STUDENT IN AN ORGANIZED HEALTH CARE EDUCATION/TRAINING PROGRAM

## 2024-06-28 PROCEDURE — 99215 OFFICE O/P EST HI 40 MIN: CPT | Performed by: STUDENT IN AN ORGANIZED HEALTH CARE EDUCATION/TRAINING PROGRAM

## 2024-06-28 NOTE — PROGRESS NOTES
[facility] Note    Name: Terence Anton    Date: 6/30/2024    Terence Anton is a 43 year old male who presents for evaluation of T1DM management.     Subjective:    Initial HPI consult in April 2024  Chief complaint: Management of T1DM        DM hx:  -Diagnosed with diabetes in 2018, initially managed as T2 and then diagnosed with T1 around 2020  -Family history- yes, daughter with T1 (at age 4)     -Re: potential DM medication contraindication: denies history of pancreatitis, personal/fam hx of medullary thyroid ca/MEN2, UTI/yeast infxn, gastroparesis  -Presence of associated DM complications (if positive, checkbox selected):  [] Macrovascular complications (CAD/CVA/PAD)  [] Neuropathy  [] Retinopathy  [] Nephropathy  [] HTN  [x] Hyperlipidemia  -Lifestyle: active, making dietary changes     DM meds at first office visit:Metformin 500 mg daily, Tresiba 20 u at bedtime, Novolog 10-15u TID     Interval Hx  6/28/2024  DM Meds: Metformin tabs - 500 mg BID (takes it daily or PRN); novolog flexpen sopn - 100 unit/ml 10-15u TID; tresiba flextouch sopn - 100 unit/ml  20u at bedtime   Started lifting over the last 3 weeks  Had a root canal done on Monday and was on steroids for the last 2 days  He feels he has made changes over the last month     Continuous Glucose Monitoring Interpretation  Terence Anton has undergone continuous glucose monitoring with the Dexcom G7 CGM with phone (connected to clinic profile).  The blood glucose tracings were evaluated for two weeks prior to office visit. Blood glucose tracings demonstrated areas of hyperglycemia post-meal, particularly post-dinner . There were several areas of hypoglycemia notedduring the weeks of evaluation.         830/9 takes around 13 u novolog, around 1230-2 takes up to 15u, around 6-7P 15u   Previously trialed/failed DM meds: ozempic when T2 without SE    History/Other:    Allergies, PMH, SocHx and FHx reviewed and updated as appropriate in Epic on     METFORMIN 500 MG Oral Tab TAKE 1 TABLET BY MOUTH TWICE A  tablet 0    insulin degludec (TRESIBA FLEXTOUCH) 100 UNIT/ML Subcutaneous Solution Pen-injector Uses up to 30 units daily as directed 30 mL 0    insulin aspart (NOVOLOG FLEXPEN) 100 Units/mL Subcutaneous Solution Pen-injector Using up to 50 units daily as directed in divided doses at meals 45 mL 0    atorvastatin 20 MG Oral Tab TAKE 1 TABLET BY MOUTH EVERY DAY AT NIGHT 90 tablet 3    Continuous Blood Gluc Sensor (DEXCOM G6 SENSOR) Does not apply Misc 1 each Every 10 days. 9 each 2    ACCU-CHEK GUIDE In Vitro Strip EVERY  strip 1    Accu-Chek FastClix Lancets Does not apply Misc Test 3 x daily 1 each 1    Continuous Blood Gluc Transmit (DEXCOM G6 TRANSMITTER) Does not apply Misc One transmitter every 90 days, use with Dexcom G6 sensor, E10.65 1 each 3    BD PEN NEEDLE MAURICE 2ND GEN 32G X 4 MM Does not apply Misc USE 5 TIMES DAILY 500 each 3    glucagon (GVOKE HYPOPEN 2-PACK) 1 MG/0.2ML Subcutaneous Solution Auto-injector Inject 1 mg into the skin as needed. 0.4 mL 1    Blood Glucose Monitoring Suppl (ACCU-CHEK GUIDE) w/Device Does not apply Kit TEST ONCE DAILY AS DIRECTED       No Known Allergies  Current Outpatient Medications   Medication Sig Dispense Refill    METFORMIN 500 MG Oral Tab TAKE 1 TABLET BY MOUTH TWICE A  tablet 0    insulin degludec (TRESIBA FLEXTOUCH) 100 UNIT/ML Subcutaneous Solution Pen-injector Uses up to 30 units daily as directed 30 mL 0    insulin aspart (NOVOLOG FLEXPEN) 100 Units/mL Subcutaneous Solution Pen-injector Using up to 50 units daily as directed in divided doses at meals 45 mL 0    atorvastatin 20 MG Oral Tab TAKE 1 TABLET BY MOUTH EVERY DAY AT NIGHT 90 tablet 3    Continuous Blood Gluc Sensor (DEXCOM G6 SENSOR) Does not apply Misc 1 each Every 10 days. 9 each 2    ACCU-CHEK GUIDE In Vitro Strip EVERY  strip 1    Accu-Chek FastClix Lancets Does not apply Misc Test 3 x daily 1 each 1    Continuous  Blood Gluc Transmit (DEXCOM G6 TRANSMITTER) Does not apply Misc One transmitter every 90 days, use with Dexcom G6 sensor, E10.65 1 each 3    BD PEN NEEDLE MAURICE 2ND GEN 32G X 4 MM Does not apply Misc USE 5 TIMES DAILY 500 each 3    glucagon (GVOKE HYPOPEN 2-PACK) 1 MG/0.2ML Subcutaneous Solution Auto-injector Inject 1 mg into the skin as needed. 0.4 mL 1    Blood Glucose Monitoring Suppl (ACCU-CHEK GUIDE) w/Device Does not apply Kit TEST ONCE DAILY AS DIRECTED       Past Medical History:    Calculus of kidney    High cholesterol    Hypothyroidism    Type 1 diabetes mellitus (HCC)     Past Surgical History:   Procedure Laterality Date    Other surgical history  2017    eswl Dr Wilson     Other surgical history  2017    cysto, stent removal dr wilson     Removal of kidney stone Left 10/22/2021     Social History     Socioeconomic History    Marital status:    Tobacco Use    Smoking status: Former     Current packs/day: 0.00     Average packs/day: 0.5 packs/day for 12.0 years (6.0 ttl pk-yrs)     Types: Cigarettes     Start date: 6/15/2003     Quit date: 6/15/2015     Years since quittin.0    Smokeless tobacco: Never   Vaping Use    Vaping status: Never Used   Substance and Sexual Activity    Alcohol use: No     Alcohol/week: 0.0 standard drinks of alcohol    Drug use: No   Other Topics Concern    Caffeine Concern Yes     Comment: tea daily    Exercise No    Seat Belt Yes     Social Determinants of Health      [facility]    Social Connections    Received from [facility], [facility]    Housing Stability     Family History   Problem Relation Age of Onset    Diabetes Mother     Other (pre diabetse) Mother     Diabetes Daughter 4        type 1        ROS/Exam    REVIEW OF SYSTEMS: Ten point review of systems has been performed and is otherwise negative and/or non-contributory, except as described  above.     VITALS  Vitals:    06/28/24 0914   BP: 132/80   Pulse: 102   SpO2: 99%       Wt Readings from Last 6 Encounters:   04/22/24 205 lb (93 kg)   03/12/24 207 lb 12.8 oz (94.3 kg)   09/22/23 195 lb 1.7 oz (88.5 kg)   08/12/23 195 lb (88.5 kg)   07/17/23 195 lb (88.5 kg)   04/04/22 201 lb 6 oz (91.3 kg)       PHYSICAL EXAM  CONSTITUTIONAL:  awake, alert, cooperative, no apparent distress, and appears stated age  PSYCH: normal affect  LUNGS: breathing comfortably  CARDIOVASCULAR:  regular rate   ENT:  no palpable thyroid nodules    EXT: Bilateral barefoot skin diabetic exam is normal, visualized feet and the appearance is normal.  Bilateral monofilament/sensation of both feet is normal.  Pulsation pedal pulse exam of both lower legs/feet is normal as well.         Labs/Imaging: Pertinent imaging reviewed.    Overall glucose control:  Lab Results   Component Value Date    A1C 8.4 (A) 06/28/2024    A1C 8.9 (H) 03/11/2024    A1C 8.1 (H) 11/10/2023    A1C 8.5 (H) 06/26/2023    A1C 7.8 (H) 02/27/2023       Assessment & Plan:     ICD-10-CM    1. Type 1 diabetes mellitus with hyperglycemia (HCC)  E10.65 POC Hgb A1C     Lipid Panel [E]     Lipid Panel [E]      2. High cholesterol  E78.00           Terence Anton is a pleasant 43 year old male here for evaluation of:    #Diabetes- PMHx of Type 1 diabetes mellitus diagnosed in 2018. Last A1c value was 8.4% done 6/28/2024.  Goal <7%.    Importance of better glucose control in preventing onset/progression of end-organ damage discussed, as well as goals of therapy and clinical significance of A1C. We reviewed his dexcom data and he has intermittent hypoglycemia prior to lunch. He is making more dietary changes and exercising more frequently.      - med changes: Continue Tresiba 20 u at bedtime   - Decrease Novolog with Breakfast 13 u --> 10 u, continue 15u with lunch, dinner 15u --> 18 u  - Continue Metformin 500 mg BID   - continue Dexcom G7 CGM with phone (connected to  clinic profile)     -Surveillance for Diabetes Complications & Risks  Foot exam/neuropathy: Last Foot Exam: 06/28/24    Retinopathy screening: No data recordedNo data recorded- due  CKD/Nephropathy screening:   Lab Results   Component Value Date    EGFRCR 110 03/11/2024    MICROALBCREA  01/14/2019      Comment:        Unable to calculate due to Urine Microalbumin <0.5 mg/dL        Blood pressure control:   BP Readings from Last 1 Encounters:   06/28/24 132/80   BP Meds:      Hyperlipidemia/Lipids:   Lab Results   Component Value Date     (H) 03/11/2024    TRIG 125 03/11/2024   Cholesterol Meds: atorvastatin Tabs - 20 MG        Return in about 3 months (around 9/28/2024).    A total of 40 minutes was spent today on obtaining history, reviewing pertinent labs, evaluating patient, providing multiple treatment options, reinforcing diet/exercise and compliance, and completing documentation.      6/30/2024  Ally York, DO    Note to patient: The 21 Century Cures Act makes medical notes like these available to patients in the interest of transparency. However, be advised this is a medical document. It is intended as peer to peer communication. It is written in medical language and may contain abbreviations or verbiage that are unfamiliar. It may appear blunt or direct. Medical documents are intended to carry relevant information, facts as evident, and the clinical opinion of the practitioner.

## 2024-07-29 ENCOUNTER — TELEPHONE (OUTPATIENT)
Facility: CLINIC | Age: 44
End: 2024-07-29

## 2024-07-29 RX ORDER — PROCHLORPERAZINE 25 MG/1
SUPPOSITORY RECTAL
Qty: 1 EACH | Refills: 3 | Status: SHIPPED | OUTPATIENT
Start: 2024-07-29

## 2024-07-29 RX ORDER — PEN NEEDLE, DIABETIC 32GX 5/32"
NEEDLE, DISPOSABLE MISCELLANEOUS
Qty: 500 EACH | Refills: 3 | Status: SHIPPED | OUTPATIENT
Start: 2024-07-29

## 2024-08-14 ENCOUNTER — PATIENT MESSAGE (OUTPATIENT)
Facility: CLINIC | Age: 44
End: 2024-08-14

## 2024-08-14 ENCOUNTER — OFFICE VISIT (OUTPATIENT)
Dept: FAMILY MEDICINE CLINIC | Facility: CLINIC | Age: 44
End: 2024-08-14
Payer: COMMERCIAL

## 2024-08-14 VITALS
HEIGHT: 72 IN | HEART RATE: 95 BPM | OXYGEN SATURATION: 96 % | SYSTOLIC BLOOD PRESSURE: 128 MMHG | RESPIRATION RATE: 14 BRPM | DIASTOLIC BLOOD PRESSURE: 78 MMHG | TEMPERATURE: 98 F | WEIGHT: 207 LBS | BODY MASS INDEX: 28.04 KG/M2

## 2024-08-14 DIAGNOSIS — Z13.6 ISCHEMIC HEART DISEASE SCREEN: ICD-10-CM

## 2024-08-14 DIAGNOSIS — E55.9 VITAMIN D DEFICIENCY: ICD-10-CM

## 2024-08-14 DIAGNOSIS — E78.00 HIGH CHOLESTEROL: ICD-10-CM

## 2024-08-14 DIAGNOSIS — E10.65 TYPE 1 DIABETES MELLITUS WITH HYPERGLYCEMIA (HCC): ICD-10-CM

## 2024-08-14 DIAGNOSIS — Z00.00 ROUTINE ADULT HEALTH MAINTENANCE: Primary | ICD-10-CM

## 2024-08-14 PROBLEM — Z72.0 TOBACCO USE: Status: RESOLVED | Noted: 2018-06-28 | Resolved: 2024-08-14

## 2024-08-14 PROCEDURE — 3074F SYST BP LT 130 MM HG: CPT | Performed by: FAMILY MEDICINE

## 2024-08-14 PROCEDURE — 99396 PREV VISIT EST AGE 40-64: CPT | Performed by: FAMILY MEDICINE

## 2024-08-14 PROCEDURE — 3078F DIAST BP <80 MM HG: CPT | Performed by: FAMILY MEDICINE

## 2024-08-14 PROCEDURE — 3008F BODY MASS INDEX DOCD: CPT | Performed by: FAMILY MEDICINE

## 2024-08-14 RX ORDER — DEXAMETHASONE 2 MG/1
TABLET ORAL
COMMUNITY
Start: 2024-07-08

## 2024-08-14 RX ORDER — DOXYCYCLINE HYCLATE 100 MG/1
100 CAPSULE ORAL 2 TIMES DAILY WITH MEALS
COMMUNITY
Start: 2024-07-15

## 2024-08-14 RX ORDER — CLINDAMYCIN PHOSPHATE 10 MG/G
GEL TOPICAL
COMMUNITY
Start: 2024-07-15

## 2024-08-14 NOTE — PROGRESS NOTES
Terence Anton is a 43 year old male who presents for a complete physical exam.   HPI:   Pt complains of nothing.  Urination changes no  ED symptoms no  Immunizations needed no  Wt Readings from Last 6 Encounters:   08/14/24 207 lb (93.9 kg)   04/22/24 205 lb (93 kg)   03/12/24 207 lb 12.8 oz (94.3 kg)   09/22/23 195 lb 1.7 oz (88.5 kg)   08/12/23 195 lb (88.5 kg)   07/17/23 195 lb (88.5 kg)     Body mass index is 28.07 kg/m².     Results for orders placed or performed in visit on 06/28/24   POC Hgb A1C   Result Value Ref Range    HEMOGLOBIN A1C 8.4 (A) 4.3 - 5.6 %    Cartridge Lot# [id number] Numeric    Cartridge Expiration Date 1/29/26 Date       Current Outpatient Medications   Medication Sig Dispense Refill    Clindamycin Phosphate 1 % External Gel APPLY TO AFFECTED AREA TWICE A DAY APPLY TO THE GROIN TWICE A DAY.      dexamethasone 2 MG Oral Tab TAKE 2 TABLETS BY MOUTH AS SOON AS POSSIBLE AND THEN TAKE 1 TABLET EVERY 2 HOURS UNTIL GONE      doxycycline 100 MG Oral Cap Take 1 capsule (100 mg total) by mouth 2 (two) times daily with meals.      Continuous Glucose Transmitter (DEXCOM G6 TRANSMITTER) Does not apply Misc ONE TRANSMITTER EVERY 90 DAYS, USE WITH DEXCOM G6 SENSOR, E10.65 1 each 3    BD PEN NEEDLE MAURICE 2ND GEN 32G X 4 MM Does not apply Misc USE 5 TIMES DAILY 500 each 3    METFORMIN 500 MG Oral Tab TAKE 1 TABLET BY MOUTH TWICE A  tablet 0    insulin degludec (TRESIBA FLEXTOUCH) 100 UNIT/ML Subcutaneous Solution Pen-injector Uses up to 30 units daily as directed 30 mL 0    insulin aspart (NOVOLOG FLEXPEN) 100 Units/mL Subcutaneous Solution Pen-injector Using up to 50 units daily as directed in divided doses at meals 45 mL 0    atorvastatin 20 MG Oral Tab TAKE 1 TABLET BY MOUTH EVERY DAY AT NIGHT 90 tablet 3    Continuous Blood Gluc Sensor (DEXCOM G6 SENSOR) Does not apply Misc 1 each Every 10 days. 9 each 2    ACCU-CHEK GUIDE In Vitro Strip EVERY  strip 1    Accu-Chek FastClix Lancets Does  not apply Misc Test 3 x daily 1 each 1    glucagon (GVOKE HYPOPEN 2-PACK) 1 MG/0.2ML Subcutaneous Solution Auto-injector Inject 1 mg into the skin as needed. 0.4 mL 1    Blood Glucose Monitoring Suppl (ACCU-CHEK GUIDE) w/Device Does not apply Kit TEST ONCE DAILY AS DIRECTED        Past Medical History:    Calculus of kidney    High cholesterol    Hypothyroidism    Type 1 diabetes mellitus (HCC)      Past Surgical History:   Procedure Laterality Date    Other surgical history  2017    eswl Dr Wilson     Other surgical history  2017    cysto, stent removal dr wilson     Removal of kidney stone Left 10/22/2021      Family History   Problem Relation Age of Onset    Diabetes Mother     Other (pre diabetse) Mother     Diabetes Daughter 4        type 1       Social History:  Social History     Socioeconomic History    Marital status:    Tobacco Use    Smoking status: Former     Current packs/day: 0.00     Average packs/day: 0.5 packs/day for 12.0 years (6.0 ttl pk-yrs)     Types: Cigarettes     Start date: 6/15/2003     Quit date: 6/15/2015     Years since quittin.1    Smokeless tobacco: Never   Vaping Use    Vaping status: Never Used   Substance and Sexual Activity    Alcohol use: No     Alcohol/week: 0.0 standard drinks of alcohol    Drug use: No   Other Topics Concern    Caffeine Concern Yes     Comment: tea daily    Exercise No    Seat Belt Yes     Social Determinants of Health      [facility], [facility]    Social Connections    Received from [facility], [facility]    Housing Stability         REVIEW OF SYSTEMS:   GENERAL: feels well overall, denies fever or chills, denies change in weight  SKIN: denies any unusual skin lesions  EYES: denies changes in vision  HENT: denies upper respiratory symptoms  LUNGS: denies SANTIAGO, wheezing, cough, orthopnea and PND  CARDIOVASCULAR: denies CP, palpitations, rapid or  slow heart rate. Denies LEBLANC/lower extremity swelling  GI: denies abdominal pain,denies heartburn, denies n/v/c/d/change in stools/blood in stool/black stool/change in appetite  : denies nocturia or changes in urinary stream, denies scrotal mass/abnormal discharge from urethra  MUSCULOSKELETAL: denies joint or muscle aches or pains  NEURO: denies headaches/dizziness  PSYCH: denies depression or anxiety  HEMATOLOGIC: denies easy bruising/bleeding/anemia/blood clot disorders  ENDOCRINE: denies weight gain/weight loss/enlargement of neck glands/polyuria/polydypsia  ALL/ASTHMA: denies allergic rhinitis/asthma    EXAM:   /78   Pulse 95   Temp 97.7 °F (36.5 °C) (Temporal)   Resp 14   Ht 6' (1.829 m)   Wt 207 lb (93.9 kg)   SpO2 96%   BMI 28.07 kg/m²   Body mass index is 28.07 kg/m².   GENERAL: NAD, pleasant, well developed, normal voice  SKIN: no rashes, no suspicious moles or lesions  HENT: NCAT, EACs clear b/l, TMs normal b/l, nasal turbinates normal b/l, oropharynx with mmm/clear/normal, gingiva normal, lips normal  EYES: PERRLA, EOMI, conjunctiva non-injected and non-icteric  NECK: supple, no adenopathy/thyromegaly/thyroid nodules/masses  CHEST: no chest tenderness  LUNGS: CTA A/P, no wheezes/ronchi/rales/crackles, normal air excursion  CARDIOVASCULAR: RRR, no murmur, no lower extremity edema, pedal and femoral pulses 2+ and symmetric b/l  GI: normoactive BS, non-distended, non-tender to palpation, no HSM/masses/pulsations  MUSCULOSKELETAL: Back with normal AROM, no joint swelling, extremities x 4 with normal strength 5/5 and symmetric and with normal AROM/PROM.   EXTREMITIES: no cyanosis, clubbing or edema  NEURO: A&O x3, CN II-XII grossly intact. Reflexes: biceps and patellar 2+ b/l and symmetric. Gait is normal.  PSYCH: normal affect, no apparent thought disorder, average judgement and insight    Immunization History   Administered Date(s) Administered    Covid-19 Vaccine Pfizer 30 mcg/0.3 ml  03/05/2021, 03/26/2021, 11/14/2021   Deferred Date(s) Deferred    Pneumococcal (Prevnar 13) 08/12/2023       ASSESSMENT AND PLAN:   Terence Anton is a 43 year old male who presents for a complete physical exam.     Terence was seen today for physical.    Diagnoses and all orders for this visit:    Routine adult health maintenance    Ischemic heart disease screen  -     Ultrafast Heart Scan    Type 1 diabetes mellitus with hyperglycemia (HCC)    High cholesterol    Vitamin D deficiency    Of note patient was diagnosed with type 2 diabetes in 2018 and then type 1 diabetes in 2020.  Currently he is on insulin and does see endocrinologist now and was previously seeing diabetic clinic.  There is discussion about taking GLP agonist such as Ozempic and I suggested considering Mounjaro as well.  He will talk to endocrinologist about it as well.  We did discuss the risk and benefit of medicine.  Follow-up with endocrinology as indicated.    It has been about 6 years since his last heart scan he had calcium score of 0.  I would recommend repeating it once every 5 years.  Diabetes does increase risk of heart disease and stroke.  This was explained.    He has not been on his atorvastatin lately and I recommended that he continue taking his statin medication lifelong as this can help him reduce complications of diabetes.        Pt educated on immunizations and health maintenance appropriate for age.     The patient verbalizes understanding of these recommendations and agrees to the plan.    The patient is asked to return for complete physical yearly.    There are no Patient Instructions on file for this visit.  No orders of the defined types were placed in this encounter.

## 2024-08-14 NOTE — TELEPHONE ENCOUNTER
From: Terence Anton  To: Ally York  Sent: 8/14/2024 10:08 AM CDT  Subject: Mounjaro/Ozempic    Hi Dr. York. I just had my annual physical with Dr. Johnson and he mentioned taking Mounjaro over Ozempic because its a GLP/GIP agonist. I wanted to get your thoughts.

## 2024-08-15 ENCOUNTER — TELEPHONE (OUTPATIENT)
Facility: CLINIC | Age: 44
End: 2024-08-15

## 2024-08-15 NOTE — TELEPHONE ENCOUNTER
Received call from patient wanting confirmation on if Lipid panel repeat is needed.    Last panel done 3/11/24 with Allyssa Richter NP    Reviewed that from last office visit with Dr. York she stated she wanted a repeat panel done 1 week prior to next follow up- patient scheduled 9/19 with Shani CASTANEDA.    Reviewed that this will be routed to provider for review- patient to hear from us next week once she is in office.    Patient verbalized understanding and states that if we don't reach him by phone, we can send a Need [employer] message with follow up.

## 2024-08-19 NOTE — TELEPHONE ENCOUNTER
RN phoned patient and reviewed ok to hold repeat Lipid panel for now. Patient will then follow up with Shani CASTANEDA on 9/19 and decide when to repeat from there.     Closing Encounter.

## 2024-08-19 NOTE — TELEPHONE ENCOUNTER
Okay to hold off on repeat lipid panel.  Per patient's last lipid panel, his LDL was above goal.  At his upcoming visit, Shani can clarify when statin was started and discuss increasing statin versus repeating lipid panel to help guide any dose changes.  Please let me know if he has any questions.  Thank you!

## 2024-08-21 ENCOUNTER — MED REC SCAN ONLY (OUTPATIENT)
Facility: CLINIC | Age: 44
End: 2024-08-21

## 2024-08-21 ENCOUNTER — TELEPHONE (OUTPATIENT)
Facility: CLINIC | Age: 44
End: 2024-08-21

## 2024-08-24 DIAGNOSIS — E10.65 TYPE 1 DIABETES MELLITUS WITH HYPERGLYCEMIA (HCC): ICD-10-CM

## 2024-08-26 DIAGNOSIS — E11.65 TYPE 2 DIABETES MELLITUS WITH HYPERGLYCEMIA (HCC): ICD-10-CM

## 2024-08-26 RX ORDER — INSULIN ASPART 100 [IU]/ML
INJECTION, SOLUTION INTRAVENOUS; SUBCUTANEOUS
Qty: 45 ML | Refills: 1 | Status: SHIPPED | OUTPATIENT
Start: 2024-08-26

## 2024-08-26 NOTE — TELEPHONE ENCOUNTER
Endocrine Refill protocol for metformin    Protocol Criteria:  PASSED  Reason: N/A    -Appointment with Endocrinology completed in the last 6 months or scheduled in the next 3 months    -GFR greater than or equal to 40 in the past 12 months     -A1c result below 8.5% in the past 6 months      Verify the above has been completed or scheduled in the appropriate timeline. If so can send a 90 day supply with 1 refill.     Last completed office visit:3/14/2024 Allyssa Richter APRN   Next scheduled Follow up:   Future Appointments   Date Time Provider Department Center   9/19/2024 11:30 AM Melva Warren APN [facility]       Last GFR result:    Lab Results   Component Value Date    EGFRCR 110 03/11/2024     Last A1c result: Last A1C result: 8.4% done 6/28/2024.    Refill protocol passed, 90 day supply with 1 refill sent to pharmacy.

## 2024-08-26 NOTE — TELEPHONE ENCOUNTER
Endocrine refill protocol for rapid acting, regular, intermediate, and mixed insulin:    Protocol Criteria:  PASSED Reason: N/A  Appointment with Endocrinology completed in the last 6 months or scheduled in the next 3 months     Verify appointment has been completed or scheduled in the appropriate timeline. If so can send a 90 day supply with 1 refill.   Verify A1C has been completed in the last 6 months and is below 8.5%    -May substitute prescriptions for Novolog and Humalog unless documented allergy (pens and vials) at the same dose and concentration per insurance preference and provider protocol.   -May substitute prescriptions for Novolin R and Humulin R unless documented allergy (pens and vials) at the same dose and concentration per insurance preference and provider protocol.   -May substitute prescriptions for Novolin N and Humulin N unless documented allergy (pens and vials) at the same dose and concentration per insurance preference and provider protocol.   -May substitute prescriptions for Humulin and Novolin 70/30 insulin unless documented allergy at the same dose and concentration per insurance preference and provider protocol.    Last completed office visit: 6/28/2024 Ally York DO   Next scheduled Follow up:   Future Appointments   Date Time Provider Department Center   9/19/2024 11:30 AM Melva Warren APN [facility]      Last A1C result: 8.4% done 6/28/2024.    Refill protocol passed, 90 day supply with 1 refill sent to pharmacy.       
no

## 2024-08-28 ENCOUNTER — TELEPHONE (OUTPATIENT)
Dept: FAMILY MEDICINE CLINIC | Facility: CLINIC | Age: 44
End: 2024-08-28

## 2024-09-04 ENCOUNTER — TELEPHONE (OUTPATIENT)
Facility: CLINIC | Age: 44
End: 2024-09-04

## 2024-09-04 DIAGNOSIS — E10.65 TYPE 1 DIABETES MELLITUS WITH HYPERGLYCEMIA (HCC): Primary | ICD-10-CM

## 2024-09-04 RX ORDER — PROCHLORPERAZINE 25 MG/1
1 SUPPOSITORY RECTAL
Qty: 9 EACH | Refills: 2 | Status: SHIPPED | OUTPATIENT
Start: 2024-09-04

## 2024-09-04 NOTE — TELEPHONE ENCOUNTER
Patient Pharmacy [facility] called in stating patient would like a Rx for Dexcom G6 Sensor. MA asked if the Pharmacy can send over PA to get started with the process.   LOV: 6/28/24       Next office visit: 9/19/24     Last filled: 3/1/24  Refill request: Dexcom G6 Sensor     Order pended and routed to provider

## 2024-09-05 NOTE — TELEPHONE ENCOUNTER
Approved today by [facility] 2017  Your PA request has been approved. Additional information will be provided in the approval communication. (Message [id number])  Authorization Expiration Date: 9/5/2025    [email or web address] message update sent to patient.     Closing Encounter.

## 2024-09-05 NOTE — TELEPHONE ENCOUNTER
Received phone call from patient stating he hasn't been able to  Dexcom sensors from pharmacy. He just placed his last Dexcom G6 sensor a few days ago, has about 1 week left on last sensor.     States he was told from pharmacy that sensor requires a PA.    Submitted PA via CMM:  Key: [id number]    Will await determination.

## 2024-09-19 ENCOUNTER — OFFICE VISIT (OUTPATIENT)
Facility: CLINIC | Age: 44
End: 2024-09-19
Payer: COMMERCIAL

## 2024-09-19 VITALS
WEIGHT: 204.81 LBS | BODY MASS INDEX: 27.74 KG/M2 | OXYGEN SATURATION: 98 % | DIASTOLIC BLOOD PRESSURE: 82 MMHG | SYSTOLIC BLOOD PRESSURE: 126 MMHG | HEIGHT: 72 IN | HEART RATE: 77 BPM

## 2024-09-19 DIAGNOSIS — E10.65 TYPE 1 DIABETES MELLITUS WITH HYPERGLYCEMIA (HCC): ICD-10-CM

## 2024-09-19 DIAGNOSIS — E10.65 TYPE 1 DIABETES MELLITUS WITH HYPERGLYCEMIA (HCC): Primary | ICD-10-CM

## 2024-09-19 DIAGNOSIS — E66.3 OVERWEIGHT WITH BODY MASS INDEX (BMI) OF 27 TO 27.9 IN ADULT: ICD-10-CM

## 2024-09-19 DIAGNOSIS — E78.2 MIXED HYPERLIPIDEMIA: ICD-10-CM

## 2024-09-19 LAB — HEMOGLOBIN A1C: 8.6 % (ref 4.3–5.6)

## 2024-09-19 PROCEDURE — 3008F BODY MASS INDEX DOCD: CPT

## 2024-09-19 PROCEDURE — 3074F SYST BP LT 130 MM HG: CPT

## 2024-09-19 PROCEDURE — 83036 HEMOGLOBIN GLYCOSYLATED A1C: CPT

## 2024-09-19 PROCEDURE — 99215 OFFICE O/P EST HI 40 MIN: CPT

## 2024-09-19 PROCEDURE — 3079F DIAST BP 80-89 MM HG: CPT

## 2024-09-19 PROCEDURE — 95251 CONT GLUC MNTR ANALYSIS I&R: CPT

## 2024-09-19 PROCEDURE — 3052F HG A1C>EQUAL 8.0%<EQUAL 9.0%: CPT

## 2024-09-19 RX ORDER — INSULIN DEGLUDEC 100 U/ML
INJECTION, SOLUTION SUBCUTANEOUS
Qty: 30 ML | Refills: 1 | Status: SHIPPED | OUTPATIENT
Start: 2024-09-19

## 2024-09-19 RX ORDER — INSULIN ASPART 100 [IU]/ML
INJECTION, SOLUTION INTRAVENOUS; SUBCUTANEOUS
COMMUNITY
Start: 2024-09-19

## 2024-09-19 RX ORDER — TIRZEPATIDE 2.5 MG/.5ML
2.5 INJECTION, SOLUTION SUBCUTANEOUS WEEKLY
Qty: 2 ML | Refills: 0 | Status: SHIPPED | OUTPATIENT
Start: 2024-09-19

## 2024-09-19 NOTE — PROGRESS NOTES
[facility] Note    Name: Terence Anton    Date: 9/19/2024    Terence Anton is a 44 year old male who presents for evaluation of T1DM management.     Subjective:    Initial HPI consult in April 2024  Chief complaint: Management of T1DM        DM hx:  -Diagnosed with diabetes in 2018, initially managed as T2 and then diagnosed with T1 around 2020  -Family history- yes, daughter with T1 (at age 4)     -Re: potential DM medication contraindication: denies history of pancreatitis, personal/fam hx of medullary thyroid ca/MEN2, UTI/yeast infxn, gastroparesis  -Presence of associated DM complications (if positive, checkbox selected):  [] Macrovascular complications (CAD/CVA/PAD)  [] Neuropathy  [] Retinopathy  [] Nephropathy  [] HTN  [x] Hyperlipidemia  -Lifestyle: active, making dietary changes     DM meds at first office visit:Metformin 500 mg daily, Tresiba 20 u at bedtime, Novolog 10-15u TID     Interval Hx  6/28/2024 - w/ Dr. York- DM Meds: Metformin tabs - 500 mg BID (takes it daily or PRN); novolog flexpen sopn - 100 unit/ml 10-15u TID; tresiba flextouch sopn - 100 unit/ml  20u at bedtime. Started lifting over the last 3 weeks. Had a root canal done on Monday and was on steroids for the last 2 days. He feels he has made changes over the last month     9/19/2024-w/ Shani CASTANEDA. Last A1c value was 8.6% done 9/19/2024. Last office visit, reduced his mealtime doses some due to lows. Was having overnight lows so self reduced tresiba to 18u daily. He's interested in possible ozempic therapy to adjunct DM. He's been using mostly fixed dose but very familiar with carb counting as his 8yo daughter is type 1 and they are very strict/regimented with her care. He usually doses himself post-meal because of varied absorption of carbs/fats. Not interested in pumps (going to a pump learning session soon with his daughter), but possibly open to Tempo pen.    Diet: Breakfast-- 2 cups of tea + croissant. Lunch- 1-2pm,  eats chicken sandwich, leftovers, fast food.  Dinner: around 6pm, home cooked usually  DM meds at visit:    - Tresiba 18u at bedtime   - Novolog with Breakfast 10u (takes 13-15u if higher), lunch 15u,  dinner 18u - all doses depends where he's at- takes a correction. For example- if 300, takes 10u will bring to 120 (1u:20>140 but doesn't use this formally)   -  Metformin 500 mg BID     Continuous Glucose Monitoring Interpretation  Terence Anton has undergone continuous glucose monitoring with their CGM.  The blood glucose tracings were evaluated for two weeks prior to office visit.   Blood glucose tracings demonstrated areas of hyperglycemia post-meal, particularly post-lunch and dinner  There were occasional hypoglycemia, particularly after a meal doses-- period of hyperglycemia followed by period of hypoglycemia .        Previously trialed/failed DM meds: ozempic when T2 without SE    History/Other:    Allergies, PMH, SocHx and FHx reviewed and updated as appropriate in Epic on    Tirzepatide (MOUNJARO) 2.5 MG/0.5ML Subcutaneous Solution Pen-injector Inject 2.5 mg into the skin once a week. 2 mL 0    insulin aspart (NOVOLOG FLEXPEN) 100 Units/mL Subcutaneous Solution Pen-injector Take 1u per 8g of carb plus 1u for every 20 points over 140. Max TDD 60u daily      Continuous Glucose Sensor (DEXCOM G6 SENSOR) Does not apply Misc 1 each Every 10 days. 9 each 2    metFORMIN 500 MG Oral Tab TAKE 1 TABLET BY MOUTH TWICE A  tablet 1    Clindamycin Phosphate 1 % External Gel APPLY TO AFFECTED AREA TWICE A DAY APPLY TO THE GROIN TWICE A DAY.      dexamethasone 2 MG Oral Tab TAKE 2 TABLETS BY MOUTH AS SOON AS POSSIBLE AND THEN TAKE 1 TABLET EVERY 2 HOURS UNTIL GONE      doxycycline 100 MG Oral Cap Take 1 capsule (100 mg total) by mouth 2 (two) times daily with meals.      Continuous Glucose Transmitter (DEXCOM G6 TRANSMITTER) Does not apply Misc ONE TRANSMITTER EVERY 90 DAYS, USE WITH DEXCOM G6 SENSOR, E10.65 1 each 3     BD PEN NEEDLE MAURICE 2ND GEN 32G X 4 MM Does not apply Misc USE 5 TIMES DAILY 500 each 3    insulin degludec (TRESIBA FLEXTOUCH) 100 UNIT/ML Subcutaneous Solution Pen-injector Uses up to 30 units daily as directed 30 mL 0    atorvastatin 20 MG Oral Tab TAKE 1 TABLET BY MOUTH EVERY DAY AT NIGHT 90 tablet 3    ACCU-CHEK GUIDE In Vitro Strip EVERY  strip 1    Accu-Chek FastClix Lancets Does not apply Misc Test 3 x daily 1 each 1    glucagon (GVOKE HYPOPEN 2-PACK) 1 MG/0.2ML Subcutaneous Solution Auto-injector Inject 1 mg into the skin as needed. 0.4 mL 1    Blood Glucose Monitoring Suppl (ACCU-CHEK GUIDE) w/Device Does not apply Kit TEST ONCE DAILY AS DIRECTED       No Known Allergies  Current Outpatient Medications   Medication Sig Dispense Refill    Tirzepatide (MOUNJARO) 2.5 MG/0.5ML Subcutaneous Solution Pen-injector Inject 2.5 mg into the skin once a week. 2 mL 0    insulin aspart (NOVOLOG FLEXPEN) 100 Units/mL Subcutaneous Solution Pen-injector Take 1u per 8g of carb plus 1u for every 20 points over 140. Max TDD 60u daily      Continuous Glucose Sensor (DEXCOM G6 SENSOR) Does not apply Misc 1 each Every 10 days. 9 each 2    metFORMIN 500 MG Oral Tab TAKE 1 TABLET BY MOUTH TWICE A  tablet 1    Clindamycin Phosphate 1 % External Gel APPLY TO AFFECTED AREA TWICE A DAY APPLY TO THE GROIN TWICE A DAY.      dexamethasone 2 MG Oral Tab TAKE 2 TABLETS BY MOUTH AS SOON AS POSSIBLE AND THEN TAKE 1 TABLET EVERY 2 HOURS UNTIL GONE      doxycycline 100 MG Oral Cap Take 1 capsule (100 mg total) by mouth 2 (two) times daily with meals.      Continuous Glucose Transmitter (DEXCOM G6 TRANSMITTER) Does not apply Misc ONE TRANSMITTER EVERY 90 DAYS, USE WITH DEXCOM G6 SENSOR, E10.65 1 each 3    BD PEN NEEDLE MAURICE 2ND GEN 32G X 4 MM Does not apply Misc USE 5 TIMES DAILY 500 each 3    insulin degludec (TRESIBA FLEXTOUCH) 100 UNIT/ML Subcutaneous Solution Pen-injector Uses up to 30 units daily as directed 30 mL 0     atorvastatin 20 MG Oral Tab TAKE 1 TABLET BY MOUTH EVERY DAY AT NIGHT 90 tablet 3    ACCU-CHEK GUIDE In Vitro Strip EVERY  strip 1    Accu-Chek FastClix Lancets Does not apply Misc Test 3 x daily 1 each 1    glucagon (GVOKE HYPOPEN 2-PACK) 1 MG/0.2ML Subcutaneous Solution Auto-injector Inject 1 mg into the skin as needed. 0.4 mL 1    Blood Glucose Monitoring Suppl (ACCU-CHEK GUIDE) w/Device Does not apply Kit TEST ONCE DAILY AS DIRECTED       Past Medical History:    Calculus of kidney    High cholesterol    Hypothyroidism    Type 1 diabetes mellitus (HCC)     Past Surgical History:   Procedure Laterality Date    Other surgical history  2017    eswl Dr Wilson     Other surgical history  2017    cysto, stent removal dr wilson     Removal of kidney stone Left 10/22/2021     Social History     Socioeconomic History    Marital status:    Tobacco Use    Smoking status: Former     Current packs/day: 0.00     Average packs/day: 0.5 packs/day for 12.0 years (6.0 ttl pk-yrs)     Types: Cigarettes     Start date: 6/15/2003     Quit date: 6/15/2015     Years since quittin.2    Smokeless tobacco: Never   Vaping Use    Vaping status: Never Used   Substance and Sexual Activity    Alcohol use: No     Alcohol/week: 0.0 standard drinks of alcohol    Drug use: No   Other Topics Concern    Caffeine Concern Yes     Comment: tea daily    Exercise No    Seat Belt Yes     Social Determinants of Health      [facility]    Social Connections    [facility]    Housing Stability     Family History   Problem Relation Age of Onset    Diabetes Mother     Other (pre diabetse) Mother     Diabetes Daughter 4        type 1        ROS/Exam    REVIEW OF SYSTEMS: Ten point review of systems has been performed and is otherwise negative and/or non-contributory, except as described above.      VITALS  Vitals:    09/19/24 1125   BP: 126/82   BP Location: Left arm   Patient Position: Sitting   Cuff Size: adult   Pulse: 77   SpO2: 98%   Weight: 204 lb 12.8 oz (92.9 kg)   Height: 6' (1.829 m)       Wt Readings from Last 6 Encounters:   09/19/24 204 lb 12.8 oz (92.9 kg)   08/14/24 207 lb (93.9 kg)   04/22/24 205 lb (93 kg)   03/12/24 207 lb 12.8 oz (94.3 kg)   09/22/23 195 lb 1.7 oz (88.5 kg)   08/12/23 195 lb (88.5 kg)       PHYSICAL EXAM  CONSTITUTIONAL:  awake, alert, cooperative, no apparent distress, and appears stated age  PSYCH: normal affect  LUNGS: breathing comfortably  CARDIOVASCULAR:  regular rate       Labs/Imaging: Pertinent imaging reviewed.    Overall glucose control:  Lab Results   Component Value Date    A1C 8.6 (A) 09/19/2024    A1C 8.4 (A) 06/28/2024    A1C 8.9 (H) 03/11/2024    A1C 8.1 (H) 11/10/2023    A1C 8.5 (H) 06/26/2023       Assessment & Plan:     ICD-10-CM    1. Type 1 diabetes mellitus with hyperglycemia (HCC)  E10.65 POC Hemoglobin A1C     insulin aspart (NOVOLOG FLEXPEN) 100 Units/mL Subcutaneous Solution Pen-injector     GLUC MNTR CONT REC FROM NTRSTL TISS FLU PHYS I&R      2. Mixed hyperlipidemia  E78.2           Terencearmen Anton is a pleasant 44 year old male here for evaluation of:    #Type 1 Diabetes- PMHx of Type 1 diabetes mellitus diagnosed in 2018. GAD65+. Historically managed on MDI with A1C in 8% range. Not interested in pump therapy, wants to try GLP1a. Discussed that behavioral changes still need to be made as despite appetite suppression with GLP1a it will not account for glycemic swings due to lack of endogenous insulin production. Also discussed that Mounjaro may not be covered w/o T2DM diagnosis (presently difficult to get without that diagnosis)-- but will try.   Also recommend using more formal carb ratio, correction factor. He might consider Tempo pen- info given today; he has a lot of insulin at home to use up.   He has a lot on his plate managing his 7  year old daughter's T1DM on MDI (has to make carb counting and dosing decisions for her as well) so tech to support his own dosing decisions to reduce DM burnout would be helpful.    Last A1c value was 8.6% done 9/19/2024. Goal <7%.  Importance of better glucose control in preventing onset/progression of end-organ damage discussed, as well as goals of therapy and clinical significance of A1C.      Med changes:  - start using carb ratio more consistently in lieu of fixed dosing- novolog 1u per 8g of carb plus a correction: 1u per 20 >140 going into the meal- try to prebolus 15-20 min before instead of delayed bolus  - incr tresiba 18u --> 20u daily  - start mounjaro 2.5mg weekly   - Continue Metformin 500 mg BID   - he may be underbasalized leading to high correction dose requirements (even before bed-- taking 20u of basal and ~40u of short acting), might stabilize if we incr tresiba some more but will await ICR use trends  - continue Dexcom G6 CGM with phone (connected to clinic profile) - reviewed how to log doses on dexcom john today  - consider tempo pen?   - Would benefit from GLP1a therapy as he has signs of insulin resistance causing hyperglycemia, sees benefits from metformin use and has hx of hyperlipidemia. BMI is in overweight category- Body mass index is 27.78 kg/m².     -Surveillance for Diabetes Complications & Risks  Foot exam/neuropathy: Last Foot Exam: 06/28/24    Retinopathy screening: Last Dilated Eye Exam: 08/16/24  Eye Exam shows Diabetic Retinopathy?: No  - due    Nephropathy screening:   - stable  Lab Results   Component Value Date    EGFRCR 110 03/11/2024    MICROALBCREA  01/14/2019      Comment:        Unable to calculate due to Urine Microalbumin <0.5 mg/dL        Blood pressure control:   - stable, no antihypertensives  BP Readings from Last 1 Encounters:   09/19/24 126/82   BP Meds:      #Hyperlipidemia  - LDL not to goal, ran out of time today to discuss poss adjustment-- recommend repeat  lipid panel now and based on result consider high intensity statin. He has been on this dose for years - initially on 40mg daily and dose reduced to 20mg in ~2019 per chart review  Lab Results   Component Value Date     (H) 03/11/2024    TRIG 125 03/11/2024   Cholesterol Meds: atorvastatin Tabs - 20 MG        Return in about 3 months (around 12/19/2024) for scheduled follow up with Dr. York.  - Politely declines 6wk follow up with LEONEL, asked him to check in with me in 1-2 weeks via [employer] on glycemic trends with above changes    A total of 45 minutes was spent today on obtaining history, reviewing pertinent labs, reviewing CGM report and interpretation provided, evaluating patient, providing multiple treatment options, reinforcing diet/exercise and compliance, and completing documentation.       9/19/2024  LEONEL Berger      Note to patient: The 21 Century Cures Act makes medical notes like these available to patients in the interest of transparency. However, be advised this is a medical document. It is intended as peer to peer communication. It is written in medical language and may contain abbreviations or verbiage that are unfamiliar. It may appear blunt or direct. Medical documents are intended to carry relevant information, facts as evident, and the clinical opinion of the practitioner.

## 2024-09-19 NOTE — PATIENT INSTRUCTIONS
Follow up plan:  With LEONEL Berger: Return in about 3 months (around 12/19/2024) for scheduled follow up with Dr. York.     Medication:   - start using carb ratio- 1u per 8g of carb plus a correction: 1u per 20 >140 going into the meal- try to prebolus   - incr tresiba 18u --> 20u daily  - start mounjaro 2.5mg weekly   - consider tempo pen?     Updated/current diabetes medication instructions:  Diabetic Medications               Tirzepatide (MOUNJARO) 2.5 MG/0.5ML Subcutaneous Solution Pen-injector (Taking) Inject 2.5 mg into the skin once a week.    insulin aspart (NOVOLOG FLEXPEN) 100 Units/mL Subcutaneous Solution Pen-injector (Taking) Take 1u per 8g of carb plus 1u for every 20 points over 140. Max TDD 60u daily    metFORMIN 500 MG Oral Tab (Taking) TAKE 1 TABLET BY MOUTH TWICE A DAY    insulin degludec (TRESIBA FLEXTOUCH) 100 UNIT/ML Subcutaneous Solution Pen-injector (Taking) Uses up to 30 units daily as directed    glucagon (GVOKE HYPOPEN 2-PACK) 1 MG/0.2ML Subcutaneous Solution Auto-injector (Taking) Inject 1 mg into the skin as needed.            Office phone number: [phone]; phones are open Monday-Friday 8:30-4:30.   Thank you for visiting our office. We look forward to working with you to reach your health goals. As a reminder, if you need refills, please request early so there is enough time to process the request. We ask that you provide at least 5 days' notice before a refill is due, so there is time to send a request to pharmacy, process the refill, and ensure there are no other problems with obtaining the medication (backorders, prior authorization paperwork, etc). Routine refills will not be addressed on weekends, so please submit these requests during the week.    Blood sugar targets:  Before breakfast: , 2 hours after meals: <180 (preferably <150), A1C goal: <7.0%  Time in Range goal is higher than 80% if using a continuous glucose monitor (Dexcom or Srini).  Time in Range  can be found within the Dexcom G7 john, on Clarity if using Dexcom G6, or on LibreView if using Srini.    HOW TO TREAT LOW BLOOD SUGAR (Hypoglycemia)  Low blood sugar= Less than 70, or if you start to have symptoms (below)  Symptoms: Shaking or trembling, fast heart rate, extreme hunger, sweating, confusion/difficulty concentrating, dizziness.    How to treat a low blood sugar if you are able to eat/drink: The Rule of 15  If you are using continuous glucose monitor that says you are low, but you do not have any symptoms, verify on fingerstick that your blood sugar is actually low before treating.   Eat 15 grams of carbs (see examples below)  Check your blood sugar after 15 minutes. If it’s still below your target range, have another serving.   Repeat these steps until it’s in your target range. Once it’s in range, if you're nervous about your sugar going low again, have a protein source (ie, a spoonful of peanut butter).   If you have a CGM you want to look for how your arrow has changed. If you arrow is pointed up or sideways after 15 min, give your CGM more time OR check with a finger stick. Try not to eat more food until at least 15 min after the first BG check - otherwise you risk having a rebound high.  If you are experiencing symptoms and you are unable to check your blood glucose for any reason, treat the hypoglycemia.  If someone has a low blood sugar and is unconscious: Don’t hesitate to call 911. If someone is unconscious and glucagon is not available or someone does not know how to use it, call 911 immediately.    To treat a low, I recommend you carry with you easy, pre-portioned treatment for low blood sugars that are 15G of carbs:   - Children sized squeeze pouch applesauce (high fiber + carbs help prevent too high of a spike)  - Small children's sized juicebox- 15g carb --> 4oz juice box  - Glucose tablets from [email or web address] you can find them near diabetes supplies --> Note, you will need to eat 3-4  tablets to get to 15g of carbs  - Children sized fruit snack pack- look for one with 15 grams of total carbohydrate  - Choice of how to treat your low is important. Complex carbs, or foods that contain fats along with carbs (like chocolate) can slow the absorption of glucose and should NOT be used to treat an emergency low

## 2024-09-19 NOTE — TELEPHONE ENCOUNTER
Endocrine refill protocol for basal insulins     Protocol Criteria: PASSED Reason: N/A  - Appointment with Endocrinology completed in the last 6 months or scheduled in the next 3 months    - A1c result completed in the last 6 months and is below 8.5%     Verify appointment has been completed or scheduled in the appropriate timeline. If so can send a 90 day supply with 1 refill per provider protocol.    Verify A1c has been completed within the last 6 months and is below 8.5%     Last completed office visit:6/28/2024 Ally York DO   Next scheduled Follow up:   Future Appointments   Date Time Provider Department Center   9/19/2024 11:30 AM Melva Warren APN [facility]      Last A1c result: Last A1c value was 8.4% done 6/28/2024.     Patient is being seen in office today- sent for review.

## 2024-11-06 DIAGNOSIS — E66.3 OVERWEIGHT WITH BODY MASS INDEX (BMI) OF 27 TO 27.9 IN ADULT: Primary | ICD-10-CM

## 2024-11-06 RX ORDER — TIRZEPATIDE 2.5 MG/.5ML
2.5 INJECTION, SOLUTION SUBCUTANEOUS WEEKLY
Qty: 2 ML | Refills: 0 | Status: SHIPPED | OUTPATIENT
Start: 2024-11-06

## 2024-11-06 NOTE — TELEPHONE ENCOUNTER
Pt phoned office requesting refill of Mounjaro 2.5mg. He just completed first month of Mounjaro. States going well- no side effects. Feels sugars are well controlled with no lows.     He prefers to stay on 2.5mg dose for now, would like refill sent for 1 month supply.     LOV: 9/19/24- Shani CASTANEDA     RTC: 3 months (around 12/19/24)    FU: scheduled with Dr. York on 12/19/24    Last Refill: first started Mounjaro- sent 9/19/24    Routing to Shani CASTANEDA.

## 2024-12-02 DIAGNOSIS — E66.3 OVERWEIGHT WITH BODY MASS INDEX (BMI) OF 27 TO 27.9 IN ADULT: ICD-10-CM

## 2024-12-02 RX ORDER — TIRZEPATIDE 2.5 MG/.5ML
2.5 INJECTION, SOLUTION SUBCUTANEOUS WEEKLY
Refills: 0 | OUTPATIENT
Start: 2024-12-02

## 2024-12-02 NOTE — TELEPHONE ENCOUNTER
Follow up scheduled for 12/19 with Ally York DO.  Patient to follow up on increasing med at that appointment.  Denied for now.

## 2024-12-19 ENCOUNTER — TELEMEDICINE (OUTPATIENT)
Facility: CLINIC | Age: 44
End: 2024-12-19
Payer: COMMERCIAL

## 2024-12-19 DIAGNOSIS — E10.65 TYPE 1 DIABETES MELLITUS WITH HYPERGLYCEMIA (HCC): Primary | ICD-10-CM

## 2024-12-19 DIAGNOSIS — E78.2 MIXED HYPERLIPIDEMIA: ICD-10-CM

## 2024-12-19 DIAGNOSIS — E66.3 OVERWEIGHT WITH BODY MASS INDEX (BMI) OF 27 TO 27.9 IN ADULT: ICD-10-CM

## 2024-12-19 PROCEDURE — 95249 CONT GLUC MNTR PT PROV EQP: CPT | Performed by: STUDENT IN AN ORGANIZED HEALTH CARE EDUCATION/TRAINING PROGRAM

## 2024-12-19 PROCEDURE — 99214 OFFICE O/P EST MOD 30 MIN: CPT | Performed by: STUDENT IN AN ORGANIZED HEALTH CARE EDUCATION/TRAINING PROGRAM

## 2024-12-19 RX ORDER — TIRZEPATIDE 2.5 MG/.5ML
2.5 INJECTION, SOLUTION SUBCUTANEOUS WEEKLY
Qty: 2 ML | Refills: 0 | Status: SHIPPED | OUTPATIENT
Start: 2024-12-19

## 2024-12-19 NOTE — PROGRESS NOTES
[facility] Note    Name: Terence Antno    Date: 12/19/2024    Patient verbally consents to a Video/Telephone service for this visit.  Patient understands and accepts financial responsibility for any deductible, co-insurance and/or co-pays associated with this service.  Terence Anton is a 44 year old male who presents for evaluation of T1DM management.     Subjective:    Initial HPI consult in April 2024  Chief complaint: Management of T1DM        DM hx:  -Diagnosed with diabetes in 2018, initially managed as T2 and then diagnosed with T1 around 2020  -Family history- yes, daughter with T1 (at age 4)     -Re: potential DM medication contraindication: denies history of pancreatitis, personal/fam hx of medullary thyroid ca/MEN2, UTI/yeast infxn, gastroparesis  -Presence of associated DM complications (if positive, checkbox selected):  [] Macrovascular complications (CAD/CVA/PAD)  [] Neuropathy  [] Retinopathy  [] Nephropathy  [] HTN  [x] Hyperlipidemia  -Lifestyle: active, making dietary changes     DM meds at first office visit:Metformin 500 mg daily, Tresiba 20 u at bedtime, Novolog 10-15u TID     Interval Hx  6/28/2024 - w/ Dr. York- DM Meds: Metformin tabs - 500 mg BID (takes it daily or PRN); novolog flexpen sopn - 100 unit/ml 10-15u TID; tresiba flextouch sopn - 100 unit/ml  20u at bedtime. Started lifting over the last 3 weeks. Had a root canal done on Monday and was on steroids for the last 2 days. He feels he has made changes over the last month     9/19/2024-w/ Shani CASTANEDA. Last A1c value was 8.6% done 9/19/2024. Last office visit, reduced his mealtime doses some due to lows. Was having overnight lows so self reduced tresiba to 18u daily. He's interested in possible ozempic therapy to adjunct DM. He's been using mostly fixed dose but very familiar with carb counting as his 8yo daughter is type 1 and they are very strict/regimented with her care. He usually doses himself post-meal because of  varied absorption of carbs/fats. Not interested in pumps (going to a pump learning session soon with his daughter), but possibly open to Tempo pen.    Diet: Breakfast-- 2 cups of tea + croissant. Lunch- 1-2pm, eats chicken sandwich, leftovers, fast food.  Dinner: around 6pm, home cooked usually  DM meds at visit:    - Tresiba 18u at bedtime   - Novolog with Breakfast 10u (takes 13-15u if higher), lunch 15u,  dinner 18u - all doses depends where he's at- takes a correction. For example- if 300, takes 10u will bring to 120 (1u:20>140 but doesn't use this formally)   -  Metformin 500 mg BID   Continuous Glucose Monitoring Interpretation  Terence Anton has undergone continuous glucose monitoring with their CGM.  The blood glucose tracings were evaluated for two weeks prior to office visit.   Blood glucose tracings demonstrated areas of hyperglycemia post-meal, particularly post-lunch and dinner There were occasional hypoglycemia, particularly after a meal doses-- period of hyperglycemia followed by period of hypoglycemia.    Interval Hx 12/19/24  DM Meds: metFORMIN Tabs - 500 MG BID; Mounjaro Soaj - 2.5 MG/0.5ML; NovoLOG FlexPen Sopn - 100 UNIT/ML carb ratio more consistently in lieu of fixed dosing- novolog 1u per 8g of carb plus a correction: 1u per 20 >140 going into the meal- try to prebolus 15-20 min before instead of delayed bolus  ; Tresiba FlexTouch Sopn - 100 UNIT/ML 20 units  Lost around 10-12 pounds with mounjaro and felt he was taking less insulin   Has been adding insulin shots into dexcom   Has been trying to work out more and cut out snacks by 10 PM. He currently corrects post-snack   Continuous Glucose Monitoring Interpretation  Terence Anton has undergone continuous glucose monitoring with their CGM.  The blood glucose tracings were evaluated for two weeks prior to office visit.   Blood glucose tracings demonstrated areas of hyperglycemia post-meal, particularly post-lunch and dinner  There were  occasional hypoglycemia, particularly after a meal doses .         Previously trialed/failed DM meds: ozempic when T2 without SE    History/Other:    Allergies, PMH, SocHx and FHx reviewed and updated as appropriate in Epic on   No outpatient medications have been marked as taking for the 12/19/24 encounter (Telemedicine) with Ally York DO.     No Known Allergies  Current Outpatient Medications   Medication Sig Dispense Refill    Tirzepatide (MOUNJARO) 2.5 MG/0.5ML Subcutaneous Solution Auto-injector Inject 2.5 mg into the skin once a week. 2 mL 0    insulin degludec (TRESIBA FLEXTOUCH) 100 UNIT/ML Subcutaneous Solution Pen-injector Uses up to 30 units daily as directed 30 mL 1    Tirzepatide (MOUNJARO) 2.5 MG/0.5ML Subcutaneous Solution Pen-injector Inject 2.5 mg into the skin once a week. 2 mL 0    insulin aspart (NOVOLOG FLEXPEN) 100 Units/mL Subcutaneous Solution Pen-injector Take 1u per 8g of carb plus 1u for every 20 points over 140. Max TDD 60u daily      Continuous Glucose Sensor (DEXCOM G6 SENSOR) Does not apply Misc 1 each Every 10 days. 9 each 2    metFORMIN 500 MG Oral Tab TAKE 1 TABLET BY MOUTH TWICE A  tablet 1    Clindamycin Phosphate 1 % External Gel APPLY TO AFFECTED AREA TWICE A DAY APPLY TO THE GROIN TWICE A DAY.      dexamethasone 2 MG Oral Tab TAKE 2 TABLETS BY MOUTH AS SOON AS POSSIBLE AND THEN TAKE 1 TABLET EVERY 2 HOURS UNTIL GONE      doxycycline 100 MG Oral Cap Take 1 capsule (100 mg total) by mouth 2 (two) times daily with meals.      Continuous Glucose Transmitter (DEXCOM G6 TRANSMITTER) Does not apply Misc ONE TRANSMITTER EVERY 90 DAYS, USE WITH DEXCOM G6 SENSOR, E10.65 1 each 3    BD PEN NEEDLE MAURICE 2ND GEN 32G X 4 MM Does not apply Misc USE 5 TIMES DAILY 500 each 3    atorvastatin 20 MG Oral Tab TAKE 1 TABLET BY MOUTH EVERY DAY AT NIGHT 90 tablet 3    ACCU-CHEK GUIDE In Vitro Strip EVERY  strip 1    Accu-Chek FastClix Lancets Does not apply Misc Test 3 x daily 1  each 1    glucagon (GVOKE HYPOPEN 2-PACK) 1 MG/0.2ML Subcutaneous Solution Auto-injector Inject 1 mg into the skin as needed. 0.4 mL 1    Blood Glucose Monitoring Suppl (ACCU-CHEK GUIDE) w/Device Does not apply Kit TEST ONCE DAILY AS DIRECTED       Past Medical History:    Calculus of kidney    High cholesterol    Hypothyroidism    Type 1 diabetes mellitus (HCC)     Past Surgical History:   Procedure Laterality Date    Other surgical history  2017    eswl Dr Wilson     Other surgical history  2017    cysto, stent removal dr wilson     Removal of kidney stone Left 10/22/2021     Social History     Socioeconomic History    Marital status:    Tobacco Use    Smoking status: Former     Current packs/day: 0.00     Average packs/day: 0.5 packs/day for 12.0 years (6.0 ttl pk-yrs)     Types: Cigarettes     Start date: 6/15/2003     Quit date: 6/15/2015     Years since quittin.5    Smokeless tobacco: Never   Vaping Use    Vaping status: Never Used   Substance and Sexual Activity    Alcohol use: No     Alcohol/week: 0.0 standard drinks of alcohol    Drug use: No   Other Topics Concern    Caffeine Concern Yes     Comment: tea daily    Exercise No    Seat Belt Yes     Social Drivers of Health      [facility]    Social Connections    [facility]    Housing Stability     Family History   Problem Relation Age of Onset    Diabetes Mother     Other (pre diabetse) Mother     Diabetes Daughter 4        type 1        ROS/Exam    REVIEW OF SYSTEMS: Ten point review of systems has been performed and is otherwise negative and/or non-contributory, except as described above.     VITALS  There were no vitals filed for this visit.      Wt Readings from Last 6 Encounters:   24 204 lb 12.8 oz (92.9 kg)   24 207 lb (93.9 kg)   24 205 lb (93 kg)   24 207 lb 12.8 oz (94.3 kg)    09/22/23 195 lb 1.7 oz (88.5 kg)   08/12/23 195 lb (88.5 kg)   PHYSICAL EXAM- limited due to telemedicine encounter    Constitutional Not in acute distress  Pulmonary: no wheezing heard, no coughing on the phone. Speaking in full sentences.  Neurological:  Alert and oriented to person, place and time.   Psychiatric: Normal affect, mood and behavior appropriate       Labs/Imaging: Pertinent imaging reviewed.    Overall glucose control:  Lab Results   Component Value Date    A1C 8.6 (A) 09/19/2024    A1C 8.4 (A) 06/28/2024    A1C 8.9 (H) 03/11/2024    A1C 8.1 (H) 11/10/2023    A1C 8.5 (H) 06/26/2023       Assessment & Plan:     ICD-10-CM    1. Type 1 diabetes mellitus with hyperglycemia (HCC)  E10.65 Hemoglobin A1C [E]      2. Mixed hyperlipidemia  E78.2           Terence Anton is a pleasant 44 year old male here for evaluation of:    #Type 1 Diabetes- PMHx of Type 1 diabetes mellitus diagnosed in 2018. GAD65+. Historically managed on MDI with A1C in 8% range. Not interested in pump therapy, wants to try GLP1a. Discussed that behavioral changes still need to be made as despite appetite suppression with GLP1a it will not account for glycemic swings due to lack of endogenous insulin production. Also discussed that Mounjaro may not be covered w/o T2DM diagnosis (presently difficult to get without that diagnosis)-- but will try.   Also recommend using more formal carb ratio, correction factor. He might consider Tempo pen- info given today; he has a lot of insulin at home to use up.   He has a lot on his plate managing his 7 year old daughter's T1DM on MDI (has to make carb counting and dosing decisions for her as well) so tech to support his own dosing decisions to reduce DM burnout would be helpful.    Last A1c value was 8.6% done 9/19/2024. Goal <7%.  Importance of better glucose control in preventing onset/progression of end-organ damage discussed, as well as goals of therapy and clinical significance of A1C.       Med changes:  - start using carb ratio more consistently in lieu of fixed dosing- novolog 1u per 8g of carb plus a correction: 1u per 20 >140 going into the meal- try to prebolus 15-20 min before instead of delayed bolus   -1:12 ICR for snacks post-dinner   - Tresiba 20u daily  - Continue mounjaro 2.5mg weekly    - Continue Metformin 500 mg BID   - he may be underbasalized leading to high correction dose requirements.  Will start with bedtime correction with snacks.  If patient continues to note hypoglycemia overnight will likely need more basal insulin. Will await ICR use trends  - continue Dexcom G6 CGM with phone (connected to clinic profile)   - Would benefit from GLP1a therapy as he has signs of insulin resistance causing hyperglycemia, sees benefits from metformin use and has hx of hyperlipidemia. BMI is in overweight category    -Surveillance for Diabetes Complications & Risks  Foot exam/neuropathy: Last Foot Exam: 06/28/24    Retinopathy screening: Last Dilated Eye Exam: 08/16/24  Eye Exam shows Diabetic Retinopathy?: No  - due    Nephropathy screening:   - stable  Lab Results   Component Value Date    EGFRCR 110 03/11/2024    MICROALBCREA  01/14/2019      Comment:        Unable to calculate due to Urine Microalbumin <0.5 mg/dL        Blood pressure control:   - stable, no antihypertensives  BP Readings from Last 1 Encounters:   09/19/24 126/82   BP Meds:      #Hyperlipidemia  - LDL not to goal- recommend repeat lipid panel now and based on result consider high intensity statin. He has been on this dose for years - initially on 40mg daily and dose reduced to 20mg in ~2019 per chart review  Lab Results   Component Value Date     (H) 03/11/2024    TRIG 125 03/11/2024   Cholesterol Meds: atorvastatin Tabs - 20 MG        No follow-ups on file.  The above plan was discussed in detail with the patient who verbalized understanding and agreement.      Ally York DO  [facility]  12/19/2024      In reviewing this note, please be advised that Dragon Voice Recognition software used to dictate the note may have made errors in recognizing some of the words or phrases.     Note to patient: The 21 Century Cures Act makes medical notes like these available to patients in the interest of transparency. However, be advised this is a medical document. It is intended as peer to peer communication. It is written in medical language and may contain abbreviations or verbiage that are unfamiliar. It may appear blunt or direct. Medical documents are intended to carry relevant information, facts as evident, and the clinical opinion of the practitioner.

## 2024-12-28 LAB
CHOL/HDLC RATIO: 3.5 (CALC)
CHOLESTEROL, TOTAL: 164 MG/DL
HDL CHOLESTEROL: 47 MG/DL
HEMOGLOBIN A1C: 8.9 % OF TOTAL HGB
LDL-CHOLESTEROL: 101 MG/DL (CALC)
NON-HDL CHOLESTEROL: 117 MG/DL (CALC)
TRIGLYCERIDES: 75 MG/DL

## 2025-01-01 ENCOUNTER — PATIENT MESSAGE (OUTPATIENT)
Facility: CLINIC | Age: 45
End: 2025-01-01

## 2025-01-02 NOTE — TELEPHONE ENCOUNTER
Please wish him a happy new year as well!  Agree, patient does not need to fast for an A1c test.  At his last office visit we discussed being more consistent with mealtime coverage and starting a snack time carb ratio.  Please have him check in with Teresa in 2 weeks for glucose check and we can make more changes at that time.

## 2025-01-06 NOTE — TELEPHONE ENCOUNTER
Patient read MY CHART MESSAGE  01/02/25.  Patient noted to have not completed appointment scheduling with shea. Routing to al to follow up.

## 2025-01-14 DIAGNOSIS — E66.3 OVERWEIGHT WITH BODY MASS INDEX (BMI) OF 27 TO 27.9 IN ADULT: ICD-10-CM

## 2025-01-14 DIAGNOSIS — E10.65 TYPE 1 DIABETES MELLITUS WITH HYPERGLYCEMIA (HCC): ICD-10-CM

## 2025-01-14 NOTE — TELEPHONE ENCOUNTER
Endocrine Refill protocol for oral and injectable diabetic medications    Protocol Criteria:  FAILED  Reason: N/A    If all below requirements are met, send a 90-day supply with 1 refill per provider protocol.    Verify appointment with Endocrinology completed in the last 6 months or scheduled in the next 3 months.  Verify A1C has been completed within the last 6 months and is below 8.5%     Last completed office visit: 12/19/2024 Ally York DO   Next scheduled Follow up: No future appointments.   Last A1c result: Last A1c value was 8.9% done 12/27/2024.

## 2025-01-15 RX ORDER — TIRZEPATIDE 2.5 MG/.5ML
2.5 INJECTION, SOLUTION SUBCUTANEOUS WEEKLY
Qty: 2 ML | Refills: 2 | Status: SHIPPED | OUTPATIENT
Start: 2025-01-15

## 2025-01-17 ENCOUNTER — NURSE ONLY (OUTPATIENT)
Facility: CLINIC | Age: 45
End: 2025-01-17
Payer: COMMERCIAL

## 2025-01-17 DIAGNOSIS — E10.65 TYPE 1 DIABETES MELLITUS WITH HYPERGLYCEMIA (HCC): Primary | ICD-10-CM

## 2025-01-17 PROCEDURE — 99211 OFF/OP EST MAY X REQ PHY/QHP: CPT | Performed by: STUDENT IN AN ORGANIZED HEALTH CARE EDUCATION/TRAINING PROGRAM

## 2025-01-17 NOTE — PROGRESS NOTES
Continuous Glucose Monitor Download - Dexcom G7    Terence Anton  9/4/1980    Referred by: Ally York DO     Patient seen by [facility]: Via virtual visit  -  This visit is conducted using Telemedicine with live, interactive video and audio. Patient has been referred to the [facility] website at [email or web address] to review the yearly Consent to Treat document. Patient understands and accepts financial responsibility for any deductible, co-insurance and/or co-pays associated with this service.     Medical Background      Past Medical History:    Calculus of kidney    High cholesterol    Hypothyroidism    Type 1 diabetes mellitus (HCC)      Lab Results   Component Value Date    A1C 8.9 (H) 12/27/2024    A1C 8.6 (A) 09/19/2024    A1C 8.4 (A) 06/28/2024    A1C 8.9 (H) 03/11/2024    A1C 8.1 (H) 11/10/2023         Patient has T1DM, seen today regarding blood glucose check in after adjustments at last office visit on 12/19/2024    Medication Review      Diabetic Meds       Insulin Disp Start End     insulin degludec (TRESIBA FLEXTOUCH) 100 UNIT/ML Subcutaneous Solution Pen-injector 30 mL 9/19/2024 --    Sig: Uses up to 30 units daily as directed    Earliest Fill Date: 9/19/2024    Renewals       Renewal provider: Ally York DO             insulin aspart (NOVOLOG FLEXPEN) 100 Units/mL Subcutaneous Solution Pen-injector -- 9/19/2024 --    Sig: Take 1u per 8g of carb plus 1u for every 20 points over 140. Max TDD 60u daily    Class: Historical    Notes to Pharmacy: Patient will also need insulin pen needles       Biguanides Disp Start End     metFORMIN 500 MG Oral Tab 180 tablet 8/26/2024 --    Sig: TAKE 1 TABLET BY MOUTH TWICE A DAY       Diabetic Supplies Disp Start End     Continuous Glucose Sensor (DEXCOM G6 SENSOR) Does not apply Misc 9 each 9/4/2024 --    Sig - Route: 1 each Every 10 days. - Does not apply    Prior authorization: Canceled - Other ([facility]     Continuous Glucose Transmitter (DEXCOM G6  TRANSMITTER) Does not apply Misc 1 each 7/29/2024 --    Sig: ONE TRANSMITTER EVERY 90 DAYS, USE WITH DEXCOM G6 SENSOR, E10.65    Renewals       Renewal provider: Allyssa Richter APRN               Incretin Mimetic Agents Disp Start End     Tirzepatide (MOUNJARO) 2.5 MG/0.5ML Subcutaneous Solution Auto-injector 2 mL 1/15/2025 --    Sig - Route: INJECT 2.5 MG SUBCUTANEOUSLY WEEKLY - Subcutaneous    No prior authorization was found for this prescription.    Found prior authorization for another prescription for the same medication: Denied     Tirzepatide (MOUNJARO) 2.5 MG/0.5ML Subcutaneous Solution Pen-injector 2 mL 9/19/2024 --    Sig - Route: Inject 2.5 mg into the skin once a week. - Subcutaneous             Last office visit notes:  - start using carb ratio more consistently in lieu of fixed dosing- novolog 1u per 8g of carb plus a correction: 1u per 20 >140 going into the meal- try to prebolus 15-20 min before instead of delayed bolus                 -1:12 ICR for snacks post-dinner   - Tresiba 20u daily  - Continue mounjaro 2.5mg weekly    - Continue Metformin 500 mg BID     Patient Comments:  None    Blood Glucose Review          Interpretation of Data:  TIR not to goal, but improved from last office visit. Data shows elevation most consistently after meals (likely due to timing of dosing) and overnight, as evidenced below:        Patient states that he gives insulin 15 - 20 minutes before meals if blood glucose is closer to 200 and will wait tod eliver if blood glucose is closer to 100.   --> CDCES advised to deliver insulin 15 min before meals UNLESS under 80 mg/dL     Patient Concerns      A1c cam back as 8.9, but GMI shows 7.7, patient does not want to repeat the lab but believes it is not accurate to his true A1c considering GMI.     Recommendations / Plan      - Increase ICR to 1:6     Future Appointments   Date Time Provider Department Center   1/17/2025 11:00 AM [facility] DIABETIC EDUCATOR [facility]  [place]        1/17/2025  Teresa Yi RN, MSN, [facility], [facility]  Diabetes Care &      A total of 15 minutes was spent with the patient including chart review, discussion and education / advisement pertinent to patient and provider specified concerns as documented above.     Note to patient: The 21 Century Cures Act makes medical notes like these available to patients in the interest of transparency. However, be advised this is a medical document. It is intended as peer to peer communication. It is written in medical language and may contain abbreviations or verbiage that are unfamiliar. It may appear blunt or direct. Medical documents are intended to carry relevant information, facts as evident, and the clinical opinion of the practitioner.

## 2025-02-24 DIAGNOSIS — E10.65 TYPE 1 DIABETES MELLITUS WITH HYPERGLYCEMIA (HCC): ICD-10-CM

## 2025-02-24 RX ORDER — INSULIN ASPART 100 [IU]/ML
INJECTION, SOLUTION INTRAVENOUS; SUBCUTANEOUS
Qty: 50 ML | Refills: 1 | Status: SHIPPED | OUTPATIENT
Start: 2025-02-24

## 2025-02-24 NOTE — TELEPHONE ENCOUNTER
Endocrine refill protocol for rapid acting, regular, intermediate, and mixed insulin:    Protocol Criteria:  FAILED Reason: Elevated A1C    If all below requirements are met, send a 90-day supply with 1 refill per provider protocol.    Verify appointment with Endocrinology completed in the last 6 months or scheduled in the next 3 months.  Verify A1C has been completed within the last 6 months and is below 8.5%    Last completed office visit: 12/19/2024 Ally York DO   Next scheduled Follow up: No future appointments.   Last A1C result: 8.9% done 12/27/2024.     Last office note:  novolog 1u per 8g of carb plus a correction: 1u per 20 >140 going into the meal- try to prebolus 15-20 min before instead of delayed bolus                 -1:12 ICR for snacks post-dinner

## 2025-04-02 NOTE — PROGRESS NOTES
Paddy Torres is a 42year old male who presents today  for type 1 diabetes management. Primary care physician: Yun Rivera MD   In the past 2 weeks BG trends have improved. Has been melanie fernández MDI, basal bolus therapy .  Reports feeling \"so much bet What Type Of Note Output Would You Prefer (Optional)?: Bullet Format 290 (H) 05/22/2020    HDL 29 (L) 05/22/2020     (H) 05/22/2020    MICROALBCREA  01/14/2019      Comment:        Unable to calculate due to Urine Microalbumin <0.5 mg/dL      CREATSERUM 0.82 05/22/2020    GFRNAA 113 05/22/2020    GFRAA 131 05/22/2020 What Is The Reason For Today's Visit?: Full Body Skin Examination with No Concerns How Severe Are Your Spot(S)?: mild Injection Device for Insulin (INPEN 100-PINK-DERIK) Does not apply Device Use with novolog cartridges 2 Device 0   • Glucagon (GVOKE PFS) 1 MG/0.2ML Subcutaneous Solution Prefilled Syringe Inject 1 mg into the skin as needed.  0.2 mL 1   • Insulin Degludec ( pain.       Physical exam:  BP 94/68   Temp 98.1 °F (36.7 °C)   Ht 72\"   Wt 150 lb (68 kg)   BMI 20.34 kg/m²   Body mass index is 20.34 kg/m². Physical Exam    Constitutional: He is oriented to person, place, and time and thin. No distress.    Cardiovascu recommended to contact DM clinic sooner if questions or concerns. The patient indicates understanding of these issues and agrees to the plan.   Orders Placed This Encounter      Blood Glucose Monitoring Suppl (ACCU-CHEK GUIDE) w/Device Does not apply Kit

## 2025-04-10 DIAGNOSIS — E66.3 OVERWEIGHT WITH BODY MASS INDEX (BMI) OF 27 TO 27.9 IN ADULT: ICD-10-CM

## 2025-04-10 DIAGNOSIS — E10.65 TYPE 1 DIABETES MELLITUS WITH HYPERGLYCEMIA (HCC): ICD-10-CM

## 2025-04-11 NOTE — TELEPHONE ENCOUNTER
Endocrine Refill protocol for oral and injectable diabetic medications    Protocol Criteria:  PASSED  Reason: N/A    If all below requirements are met, send a 90-day supply with 1 refill per provider protocol.    Verify appointment with Endocrinology completed in the last 6 months or scheduled in the next 3 months.  Verify A1C has been completed within the last 6 months and is below 8.5%     Last completed office visit: 4/3/2025 Ally York DO   Next scheduled Follow up:   Future Appointments   Date Time Provider Department Center   7/11/2025 10:30 AM Melva Webb APN [facility] [place]   10/3/2025 11:00 AM Ally York DO [id number] [facility] [place]      Last A1c result: Last A1c value was 8.4% done 4/3/2025.

## 2025-04-13 RX ORDER — TIRZEPATIDE 2.5 MG/.5ML
2.5 INJECTION, SOLUTION SUBCUTANEOUS WEEKLY
Qty: 2 ML | Refills: 2 | Status: SHIPPED | OUTPATIENT
Start: 2025-04-13

## 2025-05-29 DIAGNOSIS — E11.65 TYPE 2 DIABETES MELLITUS WITH HYPERGLYCEMIA (HCC): ICD-10-CM

## 2025-05-29 NOTE — TELEPHONE ENCOUNTER
Endocrine Refill protocol for metformin    Protocol Criteria:  FAILED  Reason: No labs completed in required time frame    If all below requirements are met, send a 90-day supply with 1 refill per provider protocol.     Verify appointment with Endocrinology completed in the last 6 months or scheduled in the next 3 months.  Verify A1C has been completed within the last 6 months and is below 8.5%  Verify last GFR is greater than or equal to 40 in the past 12 months    Last completed office visit:4/3/2025 Ally York DO   Last completed telemed visit: 12/19/2024 Ally York DO  Next scheduled Follow up:   Future Appointments   Date Time Provider Department Center   7/11/2025 10:30 AM Melva Webb APN [facility] [place]   10/3/2025 11:00 AM Ally York DO [id number] [facility] [place]       Last GFR result:    Lab Results   Component Value Date    EGFRCR 110 03/11/2024     Last A1c result: Last A1C result: 8.4% done 4/3/2025.      Last office note: Continue Metformin 500 mg BID

## 2025-06-02 DIAGNOSIS — E10.65 TYPE 1 DIABETES MELLITUS WITH HYPERGLYCEMIA (HCC): ICD-10-CM

## 2025-06-02 RX ORDER — INSULIN DEGLUDEC 100 U/ML
INJECTION, SOLUTION SUBCUTANEOUS
Qty: 24 ML | Refills: 1 | Status: SHIPPED | OUTPATIENT
Start: 2025-06-02

## 2025-06-02 NOTE — TELEPHONE ENCOUNTER
Endocrine refill protocol for basal insulins     Protocol Criteria: PASSED Reason: N/A    If all below requirements are met, send a 90-day supply with 1 refill per provider protocol.       Verify Appointment with Endocrinology completed in the last 6 months or scheduled in the next 3 months.  Verify A1C has been completed within the last 6 months and is below 8.5%     Last completed office visit:4/3/2025 Ally York DO   Last completed telemed visit: 12/19/2024 Ally York DO  Next scheduled Follow up:   Future Appointments   Date Time Provider Department Center   7/11/2025 10:30 AM Melva Webb APN [facility] [place]   10/3/2025 11:00 AM Ally York DO [id number] [facility] [place]      Last A1c result: Last A1c value was 8.4% done 4/3/2025.

## 2025-06-04 NOTE — TELEPHONE ENCOUNTER
Name from pharmacy: ATORVASTATIN 20 MG TABLET         Will file in chart as: ATORVASTATIN 20 MG Oral Tab    Sig: TAKE 1 TABLET BY MOUTH EVERY DAY AT NIGHT    Disp: 90 tablet    Refills: 3 (Pharmacy requested: Not specified)    Start: 6/2/2025    Class: Normal    Non-formulary    Last ordered: 1 year ago (5/10/2024) by Aly Johnson MD    Last refill: 3/4/2025    Rx #: [id number]    Cholesterol Medication Protocol [id number]/02/2025 12:25 AM   Protocol Details ALT < 80    ALT resulted within past year    Lipid panel within past 12 months    In person appointment or virtual visit in the past 12 mos or appointment in next 3 mos    Medication is active on med list      To be filled at: [place] [id number] [address] [phone], [phone]     Future Appointments   Date Time Provider Department Center   7/11/2025 10:30 AM Melva Webb APN [facility]   10/3/2025 11:00 AM Ally York DO [id number] [facility]     LOV: cpx: 8/14/24  Last r/f: 5/10/24 # 90 3 r/fs  Labs: 12/27/24     RTC: yearly    Please advise

## 2025-06-05 RX ORDER — ATORVASTATIN CALCIUM 20 MG/1
20 TABLET, FILM COATED ORAL NIGHTLY
Qty: 90 TABLET | Refills: 3 | Status: SHIPPED | OUTPATIENT
Start: 2025-06-05

## 2025-06-28 LAB
ALBUMIN/GLOBULIN RATIO: 1.8 (CALC) (ref 1–2.5)
ALBUMIN: 4.6 G/DL (ref 3.6–5.1)
ALKALINE PHOSPHATASE: 96 U/L (ref 36–130)
ALT: 22 U/L (ref 9–46)
AST: 22 U/L (ref 10–40)
BILIRUBIN, TOTAL: 0.7 MG/DL (ref 0.2–1.2)
BUN: 13 MG/DL (ref 7–25)
CALCIUM: 10 MG/DL (ref 8.6–10.3)
CARBON DIOXIDE: 28 MMOL/L (ref 20–32)
CHLORIDE: 105 MMOL/L (ref 98–110)
CREATININE: 0.91 MG/DL (ref 0.6–1.29)
EGFR: 108 ML/MIN/1.73M2
GLOBULIN: 2.6 G/DL (CALC) (ref 1.9–3.7)
GLUCOSE: 121 MG/DL (ref 65–99)
POTASSIUM: 5 MMOL/L (ref 3.5–5.3)
PROTEIN, TOTAL: 7.2 G/DL (ref 6.1–8.1)
SODIUM: 143 MMOL/L (ref 135–146)

## 2025-07-08 ENCOUNTER — HOSPITAL ENCOUNTER (EMERGENCY)
Age: 45
Discharge: HOME | End: 2025-07-08
Payer: COMMERCIAL

## 2025-07-08 ENCOUNTER — APPOINTMENT (OUTPATIENT)
Dept: CT IMAGING | Age: 45
End: 2025-07-08
Attending: NURSE PRACTITIONER
Payer: COMMERCIAL

## 2025-07-08 ENCOUNTER — TELEPHONE (OUTPATIENT)
Facility: CLINIC | Age: 45
End: 2025-07-08

## 2025-07-08 VITALS
WEIGHT: 185 LBS | HEART RATE: 56 BPM | OXYGEN SATURATION: 98 % | HEIGHT: 72 IN | RESPIRATION RATE: 16 BRPM | BODY MASS INDEX: 25.06 KG/M2 | TEMPERATURE: 98 F | SYSTOLIC BLOOD PRESSURE: 130 MMHG | DIASTOLIC BLOOD PRESSURE: 77 MMHG

## 2025-07-08 DIAGNOSIS — E10.65 TYPE 1 DIABETES MELLITUS WITH HYPERGLYCEMIA (HCC): ICD-10-CM

## 2025-07-08 DIAGNOSIS — E66.3 OVERWEIGHT WITH BODY MASS INDEX (BMI) OF 27 TO 27.9 IN ADULT: ICD-10-CM

## 2025-07-08 DIAGNOSIS — N20.1 URETEROLITHIASIS: Primary | ICD-10-CM

## 2025-07-08 LAB
ALBUMIN SERPL-MCNC: 4.6 G/DL (ref 3.2–4.8)
ALBUMIN/GLOB SERPL: 1.8 {RATIO} (ref 1–2)
ALP LIVER SERPL-CCNC: 106 U/L (ref 45–117)
ALT SERPL-CCNC: 26 U/L (ref 10–49)
ANION GAP SERPL CALC-SCNC: 6 MMOL/L (ref 0–18)
AST SERPL-CCNC: 18 U/L (ref ?–34)
BASOPHILS # BLD AUTO: 0.01 X10(3) UL (ref 0–0.2)
BASOPHILS NFR BLD AUTO: 0.1 %
BILIRUB SERPL-MCNC: 0.6 MG/DL (ref 0.3–1.2)
BILIRUB UR QL CFM: NEGATIVE
BUN BLD-MCNC: 10 MG/DL (ref 9–23)
CALCIUM BLD-MCNC: 9.7 MG/DL (ref 8.7–10.6)
CHLORIDE SERPL-SCNC: 106 MMOL/L (ref 98–112)
CO2 SERPL-SCNC: 30 MMOL/L (ref 21–32)
COLOR UR AUTO: YELLOW
CREAT BLD-MCNC: 0.94 MG/DL (ref 0.7–1.3)
EGFRCR SERPLBLD CKD-EPI 2021: 104 ML/MIN/1.73M2 (ref 60–?)
EOSINOPHIL # BLD AUTO: 0.28 X10(3) UL (ref 0–0.7)
EOSINOPHIL NFR BLD AUTO: 3.3 %
ERYTHROCYTE [DISTWIDTH] IN BLOOD BY AUTOMATED COUNT: 12.6 %
EST. AVERAGE GLUCOSE BLD GHB EST-MCNC: 197 MG/DL (ref 68–126)
GLOBULIN PLAS-MCNC: 2.5 G/DL (ref 2–3.5)
GLUCOSE BLD-MCNC: 98 MG/DL (ref 70–99)
GLUCOSE UR STRIP.AUTO-MCNC: 100 MG/DL
HBA1C MFR BLD: 8.5 % (ref ?–5.7)
HCT VFR BLD AUTO: 46.6 % (ref 39–53)
HGB BLD-MCNC: 15.7 G/DL (ref 13–17.5)
IMM GRANULOCYTES # BLD AUTO: 0.02 X10(3) UL (ref 0–1)
IMM GRANULOCYTES NFR BLD: 0.2 %
LEUKOCYTE ESTERASE UR QL STRIP.AUTO: NEGATIVE
LIPASE SERPL-CCNC: 41 U/L (ref 12–53)
LYMPHOCYTES # BLD AUTO: 2.45 X10(3) UL (ref 1–4)
LYMPHOCYTES NFR BLD AUTO: 29.3 %
MCH RBC QN AUTO: 30 PG (ref 26–34)
MCHC RBC AUTO-ENTMCNC: 33.7 G/DL (ref 31–37)
MCV RBC AUTO: 88.9 FL (ref 80–100)
MONOCYTES # BLD AUTO: 0.48 X10(3) UL (ref 0.1–1)
MONOCYTES NFR BLD AUTO: 5.7 %
NEUTROPHILS # BLD AUTO: 5.13 X10 (3) UL (ref 1.5–7.7)
NEUTROPHILS # BLD AUTO: 5.13 X10(3) UL (ref 1.5–7.7)
NEUTROPHILS NFR BLD AUTO: 61.4 %
NITRITE UR QL STRIP.AUTO: NEGATIVE
OSMOLALITY SERPL CALC.SUM OF ELEC: 293 MOSM/KG (ref 275–295)
PH UR STRIP.AUTO: 6 [PH] (ref 5–8)
PLATELET # BLD AUTO: 257 10(3)UL (ref 150–450)
POTASSIUM SERPL-SCNC: 4.1 MMOL/L (ref 3.5–5.1)
PROT SERPL-MCNC: 7.1 G/DL (ref 5.7–8.2)
RBC # BLD AUTO: 5.24 X10(6)UL (ref 4.3–5.7)
RBC #/AREA URNS AUTO: >10 /HPF
SODIUM SERPL-SCNC: 142 MMOL/L (ref 136–145)
SP GR UR STRIP.AUTO: >=1.03 (ref 1–1.03)
UROBILINOGEN UR STRIP.AUTO-MCNC: 0.2 MG/DL
WBC # BLD AUTO: 8.4 X10(3) UL (ref 4–11)

## 2025-07-08 PROCEDURE — 96374 THER/PROPH/DIAG INJ IV PUSH: CPT

## 2025-07-08 PROCEDURE — 74176 CT ABD & PELVIS W/O CONTRAST: CPT | Performed by: NURSE PRACTITIONER

## 2025-07-08 PROCEDURE — 83036 HEMOGLOBIN GLYCOSYLATED A1C: CPT

## 2025-07-08 PROCEDURE — 96375 TX/PRO/DX INJ NEW DRUG ADDON: CPT

## 2025-07-08 PROCEDURE — 99284 EMERGENCY DEPT VISIT MOD MDM: CPT

## 2025-07-08 PROCEDURE — 96361 HYDRATE IV INFUSION ADD-ON: CPT

## 2025-07-08 PROCEDURE — 80053 COMPREHEN METABOLIC PANEL: CPT

## 2025-07-08 PROCEDURE — 81015 MICROSCOPIC EXAM OF URINE: CPT | Performed by: NURSE PRACTITIONER

## 2025-07-08 PROCEDURE — 83690 ASSAY OF LIPASE: CPT

## 2025-07-08 PROCEDURE — 81001 URINALYSIS AUTO W/SCOPE: CPT | Performed by: NURSE PRACTITIONER

## 2025-07-08 PROCEDURE — 85025 COMPLETE CBC W/AUTO DIFF WBC: CPT

## 2025-07-08 RX ORDER — TAMSULOSIN HYDROCHLORIDE 0.4 MG/1
0.4 CAPSULE ORAL DAILY
Qty: 7 CAPSULE | Refills: 0 | OUTPATIENT
Start: 2025-07-08 | End: 2025-07-15

## 2025-07-08 RX ORDER — KETOROLAC TROMETHAMINE 30 MG/ML
30 INJECTION, SOLUTION INTRAMUSCULAR; INTRAVENOUS ONCE
Status: COMPLETED | OUTPATIENT
Start: 2025-07-08 | End: 2025-07-08

## 2025-07-08 RX ORDER — TAMSULOSIN HYDROCHLORIDE 0.4 MG/1
0.4 CAPSULE ORAL DAILY
Qty: 7 CAPSULE | Refills: 0 | Status: SHIPPED | OUTPATIENT
Start: 2025-07-08 | End: 2025-07-15

## 2025-07-08 RX ORDER — HYDROMORPHONE HYDROCHLORIDE 1 MG/ML
0.5 INJECTION, SOLUTION INTRAMUSCULAR; INTRAVENOUS; SUBCUTANEOUS ONCE
Refills: 0 | Status: COMPLETED | OUTPATIENT
Start: 2025-07-08 | End: 2025-07-08

## 2025-07-08 RX ORDER — ONDANSETRON 2 MG/ML
4 INJECTION INTRAMUSCULAR; INTRAVENOUS ONCE
Status: DISCONTINUED | OUTPATIENT
Start: 2025-07-08 | End: 2025-07-08

## 2025-07-08 RX ORDER — ONDANSETRON 4 MG/1
4 TABLET, ORALLY DISINTEGRATING ORAL EVERY 6 HOURS PRN
Qty: 20 TABLET | Refills: 0 | Status: SHIPPED | OUTPATIENT
Start: 2025-07-08 | End: 2025-07-15

## 2025-07-08 RX ORDER — HYDROCODONE BITARTRATE AND ACETAMINOPHEN 5; 325 MG/1; MG/1
1 TABLET ORAL EVERY 6 HOURS PRN
Qty: 20 TABLET | Refills: 0 | Status: SHIPPED | OUTPATIENT
Start: 2025-07-08 | End: 2025-07-13

## 2025-07-08 RX ORDER — SODIUM CHLORIDE 9 MG/ML
INJECTION, SOLUTION INTRAVENOUS CONTINUOUS
Status: DISCONTINUED | OUTPATIENT
Start: 2025-07-08 | End: 2025-07-08

## 2025-07-08 RX ORDER — ONDANSETRON 2 MG/ML
4 INJECTION INTRAMUSCULAR; INTRAVENOUS ONCE
Status: COMPLETED | OUTPATIENT
Start: 2025-07-08 | End: 2025-07-08

## 2025-07-08 RX ORDER — KETOROLAC TROMETHAMINE 10 MG/1
10 TABLET, FILM COATED ORAL EVERY 6 HOURS PRN
Qty: 30 TABLET | Refills: 0 | Status: SHIPPED | OUTPATIENT
Start: 2025-07-08 | End: 2025-07-16

## 2025-07-08 NOTE — ED PROVIDER NOTES
Patient Seen in: [facility] [place]        History  Chief Complaint   Patient presents with    Abdomen/Flank Pain     Stated Complaint: hx of kidney stones, L side pain radiating down to pelvis, type 1 diabetic,    Subjective:   43 yo male presents to the emergency department with c/o left flank pain.  Patient states he started with sudden onset left flank pain, nausea and vomiting about 1.5 hours prior to arrival.  He has a history of kidney stones in the past and states that this pain feels similar.  His last kidney stone was in 2023.  He has had to have retrieval of a stone and lithotripsy in the past.  He is unsure who he has seen for urology in the past.  He denies any fever, chills, diarrhea, urinary urgency, frequency, dysuria, or hematuria.      The history is provided by the patient.                   Objective:     Past Medical History:    Calculus of kidney    High cholesterol    Hypothyroidism    Type 1 diabetes mellitus (HCC)              Past Surgical History:   Procedure Laterality Date    Other surgical history  09/14/2017    eswl Dr Wilson     Other surgical history  09/18/2017    cysto, stent removal dr wilson     Removal of kidney stone Left 10/22/2021                Social History     Socioeconomic History    Marital status:    Tobacco Use    Smoking status: Former     Current packs/day: 0.00     Average packs/day: 0.5 packs/day for 12.0 years (6.0 ttl pk-yrs)     Types: Cigarettes     Start date: 6/15/2003     Quit date: 6/15/2015     Years since quitting: 10.0    Smokeless tobacco: Never   Vaping Use    Vaping status: Never Used   Substance and Sexual Activity    Alcohol use: No     Alcohol/week: 0.0 standard drinks of alcohol    Drug use: No   Other Topics Concern    Caffeine Concern Yes     Comment: tea daily    Exercise No    Seat Belt Yes     Social Drivers of Health      [facility]    Housing Stability                                 Physical Exam    ED Triage Vitals [07/08/25 1219]   BP (!) 167/103   Pulse (!) 46   Resp 18   Temp 97.5 °F (36.4 °C)   Temp src Oral   SpO2 100 %   O2 Device None (Room air)       Current Vitals:   Vital Signs  BP: 121/72  Pulse: (!) 47  Resp: 16  Temp: 97.5 °F (36.4 °C)  Temp src: Oral    Oxygen Therapy  SpO2: 97 %  O2 Device: None (Room air)            Physical Exam  Vitals and nursing note reviewed.   Constitutional:       General: He is not in acute distress.     Appearance: Normal appearance. He is normal weight. He is not ill-appearing.   HENT:      Head: Normocephalic and atraumatic.      Right Ear: Tympanic membrane, ear canal and external ear normal.      Left Ear: Tympanic membrane, ear canal and external ear normal.      Nose: Nose normal.      Mouth/Throat:      Mouth: Mucous membranes are moist.      Pharynx: Oropharynx is clear.   Eyes:      Conjunctiva/sclera: Conjunctivae normal.      Pupils: Pupils are equal, round, and reactive to light.   Cardiovascular:      Rate and Rhythm: Normal rate and regular rhythm.      Pulses: Normal pulses.      Heart sounds: Normal heart sounds.   Pulmonary:      Effort: Pulmonary effort is normal. No respiratory distress.      Breath sounds: Normal breath sounds.   Abdominal:      General: Abdomen is flat. There is no distension.      Palpations: Abdomen is soft.      Tenderness: There is abdominal tenderness in the left lower quadrant. There is left CVA tenderness. There is no right CVA tenderness.   Musculoskeletal:         General: Normal range of motion.   Skin:     General: Skin is warm and dry.      Capillary Refill: Capillary refill takes less than 2 seconds.   Neurological:      General: No focal deficit present.      Mental Status: He is alert and oriented to person, place, and time.   Psychiatric:         Mood and Affect: Mood normal.         Behavior: Behavior normal.             ED Course  Labs Reviewed   URINALYSIS, ROUTINE -  Abnormal; Notable for the following components:       Result Value    Clarity Urine Slightly Cloudy (*)     Glucose Urine 100 (*)     Ketones Urine Trace (*)     Blood Urine Large (*)     Protein Urine 100 mg/dL (*)     All other components within normal limits   COMP METABOLIC PANEL (14) - Normal   LIPASE - Normal   ICTOTEST - Normal   CBC WITH DIFFERENTIAL WITH PLATELET   UA MICROSCOPIC ONLY, URINE       ED Course as of 07/08/25 1444  ------------------------------------------------------------  Time: 07/08 1356  Value: CBC With Differential With Platelet  Comment: No elevated WBC or leukocytosis.  H&H is stable at 15.7 and 46.6.  ------------------------------------------------------------  Time: 07/08 1356  Value: Comp Metabolic Panel (14)  Comment: Unremarkable and essentially normal.  ------------------------------------------------------------  Time: 07/08 1356  Value: Lipase  Comment: Normal.  ------------------------------------------------------------  Time: 07/08 1415  Value: CT ABDOMEN+PELVIS KIDNEYSTONE 2D RNDR(NO IV,NO ORAL)(CPT=74176)  Comment: Impression  CONCLUSION: 3 mm proximal left ureteral calculus with moderate left hydronephrosis.                       MDM       Medical Decision Making  45 yo male with left flank pain and history of kidney stones.  CBC, CMP, UA, Lipase, CT stone, Zofran, Toradol, Dilaudid and IV fluids ordered.     See ED course for labs and CT results.  Will treat patient for a 3 mm ureterolithiasis on the left.  Flomax, Zofran, Toradol, and Norco prescribed for home.  Patient to follow-up with his urologist Dr. Flores, who is seen in the past.  Recommend patient push fluids and strain his urine as needed.  No evidence of sepsis, UTI, pyelonephritis, infected stone, or obstruction.    Patient seen in conjunction with Dr. SINAI Patterson.    Amount and/or Complexity of Data Reviewed  Labs: ordered. Decision-making details documented in ED Course.  Radiology: ordered. Decision-making  details documented in ED Course.    Risk  OTC drugs.  Prescription drug management.        Disposition and Plan     Clinical Impression:  1. Ureterolithiasis         Disposition:  Discharge  7/8/2025  2:39 pm    Follow-up:  Javier Flores MD  [address]  [facility] [address]  [phone]    Follow up in 1 week(s)            Medications Prescribed:  Current Discharge Medication List        START taking these medications    Details   tamsulosin (FLOMAX) 0.4 MG Oral Cap Take 1 capsule (0.4 mg total) by mouth daily for 7 days.  Qty: 7 capsule, Refills: 0    Associated Diagnoses: Ureterolithiasis      ondansetron 4 MG Oral Tablet Dispersible Take 1 tablet (4 mg total) by mouth every 6 (six) hours as needed.  Qty: 20 tablet, Refills: 0    Associated Diagnoses: Ureterolithiasis      Ketorolac Tromethamine 10 MG Oral Tab Take 1 tablet (10 mg total) by mouth every 6 (six) hours as needed for Pain.  Qty: 30 tablet, Refills: 0    Associated Diagnoses: Ureterolithiasis      HYDROcodone-acetaminophen 5-325 MG Oral Tab Take 1 tablet by mouth every 6 (six) hours as needed for Pain.  Qty: 20 tablet, Refills: 0    Associated Diagnoses: Ureterolithiasis                   Supplementary Documentation:

## 2025-07-08 NOTE — DISCHARGE INSTRUCTIONS
Rest and push fluids over the next few days.   Take the Flomax as prescribed daily.    Use the Zofran as needed for nausea or vomiting.   Take the Toradol for inflammation and pain.   Use the Norco as needed for more severe pain.  Do not drive, work, or drink alcohol while taking this medication.  It is opioid medication.   Follow up with Dr. Flores for urology in the next 1 week.  (This is the urologist you have seen in the past.)

## 2025-07-08 NOTE — TELEPHONE ENCOUNTER
Spoke with patient on telephone.    Asked if we could add on A1C order to labs already done today.    Patient gave verbal ok.    A1C added by Shani CATSANEDA.    Closing this encounter.

## 2025-07-08 NOTE — ED PROVIDER NOTES
I reviewed that chart and discussed the case.  I have examined the patient and noted:     Patient is a 44 year old male with PMH T1DM, history of kidney stones presents for evaluation of L sided flank pain since this morning. He reports feeling nauseous and having some episodes of NBNB emesis. No fevers, chills. No dysuria, hematuria, urgency or increased urinary frequency. This feels similar to previous kidney stones in the past.     Vital signs stable. Exam notable for benign abdominal exam without any focal tenderness tenderness to palpation; no significant CVA tenderness. Patient nontoxic appearing in no distress.     Multiple differential diagnoses were considered including but not limited to UTI, renal colic, amongst others. Doubt perforated viscus, vascular catastrophe or other emergent pathology. Labs/CT imaging of abdomen and pelvis were ordered.     CT revealed a 3 mm left ureteral stone with moderate hydronephrosis. CMP shows normal renal function. UA without signs of UTI.      I did the substantive portion of the medical decision making.     I agree with the following clinical impression(s):  Ureterolithiasis  (primary encounter diagnosis).     I agree with the plan as noted.    Patient felt significantly better following IV fluids and pain medications. Discharged home in stable condition. Return precautions were discussed.

## 2025-07-10 RX ORDER — TIRZEPATIDE 2.5 MG/.5ML
2.5 INJECTION, SOLUTION SUBCUTANEOUS WEEKLY
Qty: 2 ML | Refills: 0 | Status: SHIPPED | OUTPATIENT
Start: 2025-07-10

## 2025-07-11 ENCOUNTER — TELEMEDICINE (OUTPATIENT)
Facility: CLINIC | Age: 45
End: 2025-07-11
Payer: COMMERCIAL

## 2025-07-11 DIAGNOSIS — E78.2 MIXED HYPERLIPIDEMIA: ICD-10-CM

## 2025-07-11 DIAGNOSIS — E66.3 OVERWEIGHT WITH BODY MASS INDEX (BMI) OF 27 TO 27.9 IN ADULT: ICD-10-CM

## 2025-07-11 DIAGNOSIS — E10.65 TYPE 1 DIABETES MELLITUS WITH HYPERGLYCEMIA (HCC): Primary | ICD-10-CM

## 2025-07-11 PROCEDURE — 95251 CONT GLUC MNTR ANALYSIS I&R: CPT

## 2025-07-11 PROCEDURE — 99214 OFFICE O/P EST MOD 30 MIN: CPT

## 2025-07-11 RX ORDER — INSULIN DEGLUDEC 100 U/ML
INJECTION, SOLUTION SUBCUTANEOUS
COMMUNITY
Start: 2025-07-11

## 2025-07-11 NOTE — PATIENT INSTRUCTIONS
Return Visit:  With LEONEL Hernandez: Return in about 3 months (around 10/11/2025) for diabetes follow up.       PLAN:  - continue humalog ICR 8, ISF 1:20>140 three times daily AC  - Tresiba 20u daily --> 22u daily, if FBG still not <130, increase to tresiba 24u/day  - Continue mounjaro 2.5mg weekly    - Continue Metformin 500 mg BID   - try your best to pre bolus your humalog BEFORE you eat     Updated diabetes medication instructions from today:   Diabetic Medications              insulin degludec (TRESIBA FLEXTOUCH) 100 UNIT/ML Subcutaneous Solution Pen-injector (Taking) Inject 22 units daily.    MOUNJARO 2.5 MG/0.5ML Subcutaneous Solution Auto-injector INJECT 2.5 MG SUBCUTANEOUSLY WEEKLY    metFORMIN 500 MG Oral Tab Take 1 tablet (500 mg total) by mouth 2 (two) times daily.    insulin aspart (NOVOLOG FLEXPEN) 100 Units/mL Subcutaneous Solution Pen-injector USING UP TO 50 UNITS DAILY AS DIRECTED IN DIVIDED DOSES AT MEALS    glucagon (GVOKE HYPOPEN 2-PACK) 1 MG/0.2ML Subcutaneous Solution Auto-injector Inject 1 mg into the skin as needed.            Lab Results   Component Value Date    A1C 8.5 (H) 07/08/2025    A1C 8.4 (A) 04/03/2025    A1C 8.9 (H) 12/27/2024    A1C 8.6 (A) 09/19/2024    A1C 8.4 (A) 06/28/2024        General follow up information:  Please let us know if you require any refills at least 1 week prior to your medication running out. If you do run out of medication, please call our office ASAP to request refills (do not wait until your follow up).   Please call our office if sugars at home are consistently greater than 250 or less than 70 for medication adjustment (do not wait until your follow up appointment).  Lab results and imaging will typically be reviewed at follow up appointments, or within 3-5 business days of ALL results being in if you do not have an appointment scheduled in the near future. Our office will contact you for any abnormal results requiring more urgent follow up or action.    The on-call pager is for urgent matters only. If you are a type 1 diabetic and run out of insulin after business hours 8AM-4PM, you may call the on-call pager for a refill to a 24 hour pharmacy.  If you have adrenal insufficiency and run out of steroids, you may call the on-call pager for a refill to a 24 hour pharmacy. All other refill requests should be requested during business hours.    Office phone number: [phone]; phones are open Monday-Friday 8:30-4:30.       HOW TO TREAT LOW BLOOD SUGAR (Hypoglycemia)  Low blood sugar= Less than 70, or if you start to have symptoms (below)  Symptoms: Shaking or trembling, fast heart rate, extreme hunger, sweating, confusion/difficulty concentrating, dizziness.    How to treat a low blood sugar if you are able to eat/drink: The Rule of 15/15  If you are using continuous glucose monitor that says you are low, but you do not have any symptoms, verify on fingerstick that your blood sugar is actually low before treating.   Eat 15 grams of carbs (see examples below)  Check your blood sugar after 15 minutes. If it’s still below your target range, have another serving.   Repeat these steps until it’s in your target range. Once it’s in range, if you're nervous about your sugar going low again, have a protein source (ie, a spoonful of peanut butter).   If you have a CGM you want to look for how your arrow has changed. If you arrow is pointed up or sideways after 15 min, give your CGM more time OR check with a finger stick. Try not to eat more food until at least 15 min after the first BG check - otherwise you risk having a rebound high.  If you are experiencing symptoms and you are unable to check your blood glucose for any reason, treat the hypoglycemia.  If someone has a low blood sugar and is unconscious: Don’t hesitate to call 911. If someone is unconscious and glucagon is not available or someone does not know how to use it, call 911 immediately.     To treat a low, I  recommend you carry with you easy, pre-portioned treatment for low blood sugars that are 15G of carbs:   - Children sized squeeze pouch applesauce (high fiber + carbs help prevent too high of a spike)  - Small children's sized juicebox- 15g carb --> 4oz juice box  - Glucose tablets from [employer], you can find them near diabetes supplies --> Note, you will need to eat 3-4 tablets to get to 15g of carbs  - Children sized fruit snack pack- look for one with 15 grams of total carbohydrate  - Choice of how to treat your low is important. Complex carbs, or foods that contain fats along with carbs (like chocolate) can slow the absorption of glucose and should NOT be used to treat an emergency low

## 2025-08-04 DIAGNOSIS — E10.65 TYPE 1 DIABETES MELLITUS WITH HYPERGLYCEMIA (HCC): ICD-10-CM

## 2025-08-04 DIAGNOSIS — E66.3 OVERWEIGHT WITH BODY MASS INDEX (BMI) OF 27 TO 27.9 IN ADULT: ICD-10-CM

## 2025-08-08 ENCOUNTER — MED REC SCAN ONLY (OUTPATIENT)
Facility: CLINIC | Age: 45
End: 2025-08-08

## 2025-08-08 RX ORDER — TIRZEPATIDE 2.5 MG/.5ML
2.5 INJECTION, SOLUTION SUBCUTANEOUS WEEKLY
Qty: 6 ML | Refills: 1 | Status: SHIPPED | OUTPATIENT
Start: 2025-08-08

## 2025-08-08 RX ORDER — TIRZEPATIDE 5 MG/.5ML
5 INJECTION, SOLUTION SUBCUTANEOUS WEEKLY
Qty: 6 ML | Refills: 0 | Status: CANCELLED | OUTPATIENT
Start: 2025-08-08

## (undated) DIAGNOSIS — E11.65 TYPE 2 DIABETES MELLITUS WITH HYPERGLYCEMIA (HCC): ICD-10-CM

## (undated) DEVICE — OPEN-END FLEXI-TIP URETERAL CATHETER: Brand: FLEXI-TIP

## (undated) DEVICE — SINGLE-USE DIGITAL FLEXIBLE URETEROSCOPE: Brand: LITHOVUE

## (undated) DEVICE — NITINOL STONE RETRIEVAL BASKET: Brand: ZERO TIP

## (undated) DEVICE — STERILE SYNTHETIC POLYISOPRENE POWDER-FREE SURGICAL GLOVES WITH HYDROGEL COATING: Brand: PROTEXIS

## (undated) DEVICE — ZIPWIRE GUIDEWIRE .038X150 STR

## (undated) DEVICE — SYRINGE 20CC LL TIP

## (undated) DEVICE — SEAL BIOPSY PORT ACMI

## (undated) DEVICE — MOSES 200 FIBER

## (undated) DEVICE — SEAL Y BIOPSY PORT P6R SCOPE

## (undated) DEVICE — Device

## (undated) DEVICE — LITHOVUE STD WITH EMPOWER

## (undated) DEVICE — SOL  .9 3000ML

## (undated) DEVICE — GAUZE SPONGES,12 PLY: Brand: CURITY

## (undated) DEVICE — RETRIEVAL DEPLOYMENT DEVICE: Brand: LITHOVUE EMPOWER™

## (undated) DEVICE — KENDALL SCD EXPRESS SLEEVES, KNEE LENGTH, MEDIUM: Brand: KENDALL SCD

## (undated) DEVICE — CYSTO CDS-LF: Brand: MEDLINE INDUSTRIES, INC.

## (undated) DEVICE — GLOVE SURG SENSICARE SZ 7

## (undated) DEVICE — 3M™ TEGADERM™ TRANSPARENT FILM DRESSING, 1626W, 4 IN X 4-3/4 IN (10 CM X 12 CM), 50 EACH/CARTON, 4 CARTON/CASE: Brand: 3M™ TEGADERM™

## (undated) DEVICE — NITINOL WIRE STR STIFF 038

## (undated) DEVICE — SYSTEM PUMPING SINGLE ACTION

## (undated) DEVICE — SENS GUIW URO 150CM NIT SS

## (undated) DEVICE — URETERAL ACCESS SHEATH SET: Brand: NAVIGATOR HD

## (undated) DEVICE — BALLOON DILATATION CATHETER KIT: Brand: UROMAX ULTRA KIT

## (undated) DEVICE — ZIPWIRE GUIDE .038X150 STR/STF

## (undated) DEVICE — ZIPWIRE GUIDEWIRE .035X150 STR

## (undated) DEVICE — SCD SLEEVE KNEE HI BLEND

## (undated) NOTE — LETTER
Buffy Matthews [address]  [address] [facility]  Authorization for Surgical Operation and Procedure     Date:___________                                                                                                         Time:__________ following are some, but not all, of the potential risks that can occur: fever and allergic reactions, hemolytic reactions, transmission of diseases such as Hepatitis, AIDS and Cytomegalovirus (CMV) and fluid overload.   In the event that I wish to have an a The surgeon or my attending physician will determine when the applicable recovery period ends for purposes of reinstating the DNAR order.   10. Patients having a sterilization procedure: I understand that if the procedure is successful the results will be p to: a. Allow the anesthesiologist (anesthesia doctor) to give me medicine and do additional procedures as necessary.  Some examples are: Starting or using an “IV” to give me medicine, fluids or blood during my procedure, and having a breathing tube placed (“spinal”, “epidural”, & “nerve blocks”): I understand that rare but potential complications include headache, bleeding, infection, seizure, irregular heart rhythms, and nerve injury.     I can change my mind about having anesthesia services at any time be

## (undated) NOTE — ED AVS SNAPSHOT
Britany Miller   MRN: [id number]    Department:  [facility] in [place]   Date of Visit:  9/10/2017           Disclosure     Insurance plans vary and the physician(s) referred by the ER may not be covered by your plan.  Please contact If you have been prescribed any medication(s), please fill your prescription right away and begin taking the medication(s) as directed    If the emergency physician has read X-rays, these will be re-interpreted by a radiologist.  If there is a significant

## (undated) NOTE — LETTER
06/03/20      Terence Anton  9/4/1980    [address] [place] [address]    Patient needs following labs:       EDDIE - 65 antibody: test code: [id number] (CPT code: 56897)  Islet Cell antibody: test code: 58216 (CPT code: 73350)   Cpeptide: test code: 372

## (undated) NOTE — LETTER
10/26/2021    Santy Noss  [address] [facility] [address]    Subject: Diabetes and Traveling with Insulin    To Whom It May Concern:     Terence Anton  is under my care for Diabetes and requires insulin medication for management of his diabetes.

## (undated) NOTE — LETTER
[facility]  [address] [facility], [address] [place] [address]    Consent for Operation    Date: __________________    Time: _______________    1.  I authorize the performance upon Terence Anton the following operation:    Procedure(s):  CYSTOSCOPY, RIGHT RETR revealed by the pictures or by descriptive texts accompanying them. If the procedure has been videotaped, the surgeon will obtain the original videotape. The hospital will not be responsible for storage or maintenance of this tape.     6. For the purpose of THAT MY DOCTOR PROVIDED ME WITH THE ABOVE EXPLANATIONS, THAT ALL BLANKS OR STATEMENTS REQUIRING INSERTION OR COMPLETION WERE FILLED IN.     Signature of Patient:   ___________________________    When the patient is a minor or mentally incompetent to give co supplements, and pills I can buy without a prescription (including street drugs/illegal medications). Failure to inform my anesthesiologist about these medicines may increase my risk of anesthetic complications.   · If I am allergic to anything or have had Anesthesiologist Signature     Date   Time  I have discussed the procedure and information above with the patient (or patient’s representative) and answered their questions. The patient or their representative has agreed to have anesthesia services.     ___

## (undated) NOTE — LETTER
11/16/17        Destini Driver  [address] [facility] [address]      Dear Jaki Bonner,    Our records indicate that you have outstanding lab work and or testing that was ordered for you and has not yet been completed:               Vitamin D, 25-Hydroxy [E]

## (undated) NOTE — Clinical Note
Just HAROLDO - also forgot to ask about the statin use lol we got into a long winded DM conversation. He might be open to trying tempo, gave him info and he will checkin with me in mychart in like 2 weeks